# Patient Record
Sex: FEMALE | Race: WHITE | NOT HISPANIC OR LATINO | Employment: OTHER | URBAN - METROPOLITAN AREA
[De-identification: names, ages, dates, MRNs, and addresses within clinical notes are randomized per-mention and may not be internally consistent; named-entity substitution may affect disease eponyms.]

---

## 2017-03-07 ENCOUNTER — TRANSFERRED RECORDS (OUTPATIENT)
Dept: HEALTH INFORMATION MANAGEMENT | Facility: CLINIC | Age: 77
End: 2017-03-07

## 2017-03-07 LAB — TSH SERPL-ACNC: 3.3 UIU/ML (ref 0.3–5)

## 2017-07-01 DIAGNOSIS — Z12.31 VISIT FOR SCREENING MAMMOGRAM: ICD-10-CM

## 2017-07-01 PROCEDURE — G0202 SCR MAMMO BI INCL CAD: HCPCS | Mod: TC

## 2017-07-31 DIAGNOSIS — F33.3 SEVERE RECURRENT MAJOR DEPRESSIVE DISORDER WITH PSYCHOTIC FEATURES (H): ICD-10-CM

## 2017-07-31 NOTE — TELEPHONE ENCOUNTER
Pending Prescriptions:                       Disp   Refills    risperiDONE (RISPERDAL) 0.5 MG tablet [Ph*90 tab*0            Sig: TAKE 1 TABLET BY MOUTH DAILY AT BEDTIME.          Last Written Prescription Date: 11/28/2016  Last Fill Quantity: 90, # refills: 1  Last Office Visit with [facility], [facility] or [facility] prescribing provider: 11/28/2016       BP Readings from Last 3 Encounters:   11/28/16 141/80   06/14/16 133/80   02/23/16 142/80     Pulse Readings from Last 2 Encounters:   11/28/16 84   06/14/16 71     Lab Results   Component Value Date    GLC 72 08/07/2015     Lab Results   Component Value Date    WBC 7.6 08/07/2015     Lab Results   Component Value Date    RBC 4.58 08/07/2015     Lab Results   Component Value Date    HGB 13.5 08/07/2015     Lab Results   Component Value Date    HCT 40.3 08/07/2015     No components found for: MCT  Lab Results   Component Value Date    MCV 88 08/07/2015     Lab Results   Component Value Date    MCH 29.5 08/07/2015     Lab Results   Component Value Date    MCHC 33.5 08/07/2015     Lab Results   Component Value Date    RDW 13.3 08/07/2015     Lab Results   Component Value Date     08/07/2015     Lab Results   Component Value Date    CHOL 225 01/15/2015     Lab Results   Component Value Date    HDL 91 01/15/2015     Lab Results   Component Value Date     01/15/2015     Lab Results   Component Value Date    TRIG 143 01/15/2015     Lab Results   Component Value Date    CHOLHDLRATIO 2.5 01/15/2015     Timmy DAMON

## 2017-07-31 NOTE — TELEPHONE ENCOUNTER
Spoke with patient and scheduled appt next week but patient will be out.    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  Refilled x 6 months.  Please follow-up then

## 2017-08-01 RX ORDER — RISPERIDONE 0.5 MG/1
TABLET ORAL
Qty: 90 TABLET | Refills: 0 | Status: SHIPPED | OUTPATIENT
Start: 2017-08-01 | End: 2017-08-07

## 2017-08-07 ENCOUNTER — OFFICE VISIT (OUTPATIENT)
Dept: FAMILY MEDICINE | Facility: CLINIC | Age: 77
End: 2017-08-07
Payer: MEDICARE

## 2017-08-07 VITALS
WEIGHT: 134.8 LBS | HEIGHT: 61 IN | SYSTOLIC BLOOD PRESSURE: 140 MMHG | DIASTOLIC BLOOD PRESSURE: 80 MMHG | BODY MASS INDEX: 25.45 KG/M2 | HEART RATE: 79 BPM | TEMPERATURE: 98.3 F | OXYGEN SATURATION: 99 %

## 2017-08-07 DIAGNOSIS — M85.80 OSTEOPENIA, UNSPECIFIED LOCATION: Primary | ICD-10-CM

## 2017-08-07 DIAGNOSIS — Z78.0 POSTMENOPAUSAL STATUS: ICD-10-CM

## 2017-08-07 DIAGNOSIS — F33.3 SEVERE RECURRENT MAJOR DEPRESSIVE DISORDER WITH PSYCHOTIC FEATURES (H): ICD-10-CM

## 2017-08-07 DIAGNOSIS — N90.4 LICHEN SCLEROSUS ET ATROPHICUS OF THE VULVA: ICD-10-CM

## 2017-08-07 PROCEDURE — 99214 OFFICE O/P EST MOD 30 MIN: CPT | Performed by: PHYSICIAN ASSISTANT

## 2017-08-07 RX ORDER — FLUOXETINE 10 MG/1
10 TABLET, FILM COATED ORAL DAILY
Qty: 90 TABLET | Refills: 3 | Status: SHIPPED | OUTPATIENT
Start: 2017-08-07 | End: 2018-02-06

## 2017-08-07 RX ORDER — GABAPENTIN 300 MG/1
300 CAPSULE ORAL 3 TIMES DAILY
Qty: 270 CAPSULE | Refills: 3 | Status: SHIPPED | OUTPATIENT
Start: 2017-08-07 | End: 2018-07-02

## 2017-08-07 RX ORDER — RISPERIDONE 0.5 MG/1
TABLET ORAL
Qty: 90 TABLET | Refills: 3 | Status: SHIPPED | OUTPATIENT
Start: 2017-08-07 | End: 2018-07-02

## 2017-08-07 ASSESSMENT — ANXIETY QUESTIONNAIRES
3. WORRYING TOO MUCH ABOUT DIFFERENT THINGS: NOT AT ALL
5. BEING SO RESTLESS THAT IT IS HARD TO SIT STILL: NOT AT ALL
1. FEELING NERVOUS, ANXIOUS, OR ON EDGE: SEVERAL DAYS
IF YOU CHECKED OFF ANY PROBLEMS ON THIS QUESTIONNAIRE, HOW DIFFICULT HAVE THESE PROBLEMS MADE IT FOR YOU TO DO YOUR WORK, TAKE CARE OF THINGS AT HOME, OR GET ALONG WITH OTHER PEOPLE: NOT DIFFICULT AT ALL
6. BECOMING EASILY ANNOYED OR IRRITABLE: NOT AT ALL
GAD7 TOTAL SCORE: 1
7. FEELING AFRAID AS IF SOMETHING AWFUL MIGHT HAPPEN: NOT AT ALL
2. NOT BEING ABLE TO STOP OR CONTROL WORRYING: NOT AT ALL

## 2017-08-07 ASSESSMENT — PATIENT HEALTH QUESTIONNAIRE - PHQ9
5. POOR APPETITE OR OVEREATING: NOT AT ALL
SUM OF ALL RESPONSES TO PHQ QUESTIONS 1-9: 1

## 2017-08-07 NOTE — PROGRESS NOTES
SUBJECTIVE:                                                    Natividad Mcginnis is a 76 year old female who presents to clinic today for the following health issues:      Depression and Anxiety Follow-Up    Status since last visit: Improved     Other associated symptoms:None    Complicating factors:     Significant life event: No     Current substance abuse: None    PHQ-9 SCORE 1/12/2016 6/14/2016 11/28/2016   Total Score 4 6 1     MADISON-7 SCORE 8/24/2015 6/14/2016 11/28/2016   Total Score - - -   Total Score 7 2 3       PHQ-9  English  PHQ-9   Any Language  GAD7      Amount of exercise or physical activity: None    Problems taking medications regularly: No    Medication side effects: none  Diet: regular (no restrictions)          Problem list and histories reviewed & adjusted, as indicated.  Additional history: as documented    Current Outpatient Prescriptions   Medication Sig Dispense Refill     risperiDONE (RISPERDAL) 0.5 MG tablet TAKE 1 TABLET BY MOUTH DAILY AT BEDTIME. 90 tablet 3     FLUoxetine (PROZAC) 10 MG tablet Take 1 tablet (10 mg) by mouth daily 90 tablet 3     gabapentin (NEURONTIN) 300 MG capsule Take 1 capsule (300 mg) by mouth 3 times daily 270 capsule 3     alclomethasone (ACLOVATE) 0.05 % ointment Apply topically 2 times daily       levothyroxine (SYNTHROID, LEVOTHROID) 88 MCG tablet   0     fish oil-omega-3 fatty acids 1000 MG capsule Take 2 g by mouth daily       multivitamin, therapeutic with minerals (MULTI-VITAMIN) TABS Take 1 tablet by mouth daily. 100 tablet 3     calcium-vitamin D (CALCIUM 600/VITAMIN D) 600-400 MG-UNIT per tablet Take 1 tablet by mouth 2 times daily. 60 tablet      [DISCONTINUED] risperiDONE (RISPERDAL) 0.5 MG tablet TAKE 1 TABLET BY MOUTH DAILY AT BEDTIME.  90 tablet 0     [DISCONTINUED] FLUoxetine (PROZAC) 10 MG tablet Take 1 tablet (10 mg) by mouth daily 90 tablet 1     [DISCONTINUED] gabapentin (NEURONTIN) 300 MG capsule Take 1 capsule (300 mg) by mouth 3 times daily  "270 capsule 1     clobetasol (TEMOVATE) 0.05 % ointment Apply topically 2 times daily       BP Readings from Last 3 Encounters:   08/07/17 140/80   11/28/16 141/80   06/14/16 133/80    Wt Readings from Last 3 Encounters:   08/07/17 134 lb 12.8 oz (61.1 kg)   11/28/16 140 lb 1.6 oz (63.5 kg)   06/14/16 145 lb 12.8 oz (66.1 kg)                      Reviewed and updated as needed this visit by clinical staffTobacco  Allergies  Meds  Med Hx  Surg Hx  Fam Hx  Soc Hx      Reviewed and updated as needed this visit by Provider         ROS:  Constitutional, HEENT, cardiovascular, pulmonary, gi and gu systems are negative, except as otherwise noted.      OBJECTIVE:                                                    /80 (BP Location: Right arm, Patient Position: Chair, Cuff Size: Adult Regular)  Pulse 79  Temp 98.3  F (36.8  C) (Oral)  Ht 5' 0.5\" (1.537 m)  Wt 134 lb 12.8 oz (61.1 kg)  SpO2 99%  BMI 25.89 kg/m2  Body mass index is 25.89 kg/(m^2).  GENERAL APPEARANCE: healthy, alert and no distress  RESP: lungs clear to auscultation - no rales, rhonchi or wheezes  CV: regular rates and rhythm, normal S1 S2, no S3 or S4 and no murmur, click or rub  PSYCH: mentation appears normal and affect normal/bright    Diagnostic test results:  Diagnostic Test Results:  none        ASSESSMENT/PLAN:                                                    1. 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  Dispense: 90 tablet; Refill: 3  - FLUoxetine (PROZAC) 10 MG tablet; Take 1 tablet (10 mg) by mouth daily  Dispense: 90 tablet; Refill: 3  - gabapentin (NEURONTIN) 300 MG capsule; Take 1 capsule (300 mg) by mouth 3 times daily  Dispense: 270 capsule; Refill: 3    2. Osteopenia, unspecified location      3. Postmenopausal status    - DX Hip/Pelvis/Spine; Future    4. 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 Refilled x 1 year   Plan: risperiDONE (RISPERDAL) 0.5 MG tablet,         FLUoxetine (PROZAC) 10 MG tablet, gabapentin         (NEURONTIN) 300 MG capsule            (Z78.0) Postmenopausal status  Comment:   Plan: DX Hip/Pelvis/Spine            (N90.4) Lichen sclerosus et atrophicus of the vulva  Comment: advised follow-up with Dr. Corrales   Plan: OB/GYN REFERRAL                  Ramona Ann Aaseby-Aguilera, PA-C  [facility]

## 2017-08-07 NOTE — NURSING NOTE
"Chief Complaint   Patient presents with     Recheck Medication       Initial /80 (BP Location: Right arm, Patient Position: Chair, Cuff Size: Adult Regular)  Pulse 79  Temp 98.3  F (36.8  C) (Oral)  Ht 5' 0.5\" (1.537 m)  Wt 134 lb 12.8 oz (61.1 kg)  SpO2 99%  BMI 25.89 kg/m2 Estimated body mass index is 25.89 kg/(m^2) as calculated from the following:    Height as of this encounter: 5' 0.5\" (1.537 m).    Weight as of this encounter: 134 lb 12.8 oz (61.1 kg).  Medication Reconciliation: complete Kimberly Rojas CMA      "

## 2017-08-07 NOTE — PATIENT INSTRUCTIONS
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 Refilled x 1 year   Plan: risperiDONE (RISPERDAL) 0.5 MG tablet,         FLUoxetine (PROZAC) 10 MG tablet, gabapentin         (NEURONTIN) 300 MG capsule            (Z78.0) Postmenopausal status  Comment:   Plan: DX Hip/Pelvis/Spine            (N90.4) Lichen sclerosus et atrophicus of the vulva  Comment: advised follow-up with Dr. Corrales   Plan: OB/GYN REFERRAL

## 2017-08-07 NOTE — MR AVS SNAPSHOT
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 Refilled x 1 year   Plan: risperiDONE (RISPERDAL) 0.5 MG tablet,         FLUoxetine (PROZAC) 10 MG tablet, gabapentin         (NEURONTIN) 300 MG capsule            (Z78.0) Postmenopausal status  Comment:   Plan: DX Hip/Pelvis/Spine            (N90.4) Lichen sclerosus et atrophicus of the vulva  Comment: advised follow-up with Dr. Corrales   Plan: OB/GYN REFERRAL                      Follow-ups after your visit        Additional Services     OB/GYN REFERRAL       Your provider has referred you to:  [facility] [phone]   [email or web address]    Please be aware that coverage of these services is subject to the terms and limitations of your health insurance plan.  Call member services at your health plan with any benefit or coverage questions.      Please bring the following with you to your appointment:    (1) Any X-Rays, CTs or MRIs which have been performed.  Contact the facility where they were done to arrange for  prior to your scheduled appointment.   (2) List of current medications   (3) This referral request   (4) Any documents/labs given to you for this referral                  Future  "tests that were ordered for you today     Open Future Orders        Priority Expected Expires Ordered    DX Hip/Pelvis/Spine Routine  8/7/2018 8/7/2017            Who to contact     If you have questions or need follow up information about today's clinic visit or your schedule please contact [facility] directly at [phone].  Normal or non-critical lab and imaging results will be communicated to you by MyChart, letter or phone within 4 business days after the clinic has received the results. If you do not hear from us within 7 days, please contact the clinic through AllergEasehart or phone. If you have a critical or abnormal lab result, we will notify you by phone as soon as possible.  Submit refill requests through [employer] or call your pharmacy and they will forward the refill request to us. Please allow 3 business days for your refill to be completed.          Additional Information About Your Visit        AllergEasehart Information     [employer] gives you secure access to your electronic health record. If you see a primary care provider, you can also send messages to your care team and make appointments. If you have questions, please call your primary care clinic.  If you do not have a primary care provider, please call [phone] and they will assist you.        Care EveryWhere ID     This is your Care EveryWhere ID. This could be used by other organizations to access your [place] medical records  [id number]        Your Vitals Were     Pulse Temperature Height Pulse Oximetry BMI (Body Mass Index)       79 98.3  F (36.8  C) (Oral) 5' 0.5\" (1.537 m) 99% 25.89 kg/m2        Blood Pressure from Last 3 Encounters:   08/07/17 140/80   11/28/16 141/80   06/14/16 133/80    Weight from Last 3 Encounters:   08/07/17 134 lb 12.8 oz (61.1 kg)   11/28/16 140 lb 1.6 oz (63.5 kg)   06/14/16 145 lb 12.8 oz (66.1 kg)              We Performed the Following     OB/GYN REFERRAL          Today's Medication Changes        "   These changes are accurate as of: 8/7/17 11:14 AM.  If you have any questions, ask your nurse or doctor.               These medicines have changed or have updated prescriptions.        Dose/Directions    risperiDONE 0.5 MG tablet   Commonly known as:  risperDAL   This may have changed:  See the new instructions.   Used for:  Severe recurrent major depressive disorder with psychotic features (H)   Changed by:  Aaseby-Aguilera, Ramona Ann, PA-C        TAKE 1 TABLET BY MOUTH DAILY AT BEDTIME.   Quantity:  90 tablet   Refills:  3            Where to get your medicines      These medications were sent to [id number] - [facility] [address] [address]     Phone:  [phone]     FLUoxetine 10 MG tablet    gabapentin 300 MG capsule    risperiDONE 0.5 MG tablet                Primary Care Provider Office Phone # Fax #    Ramona Ann Aaseby-Aguilera, PA-C [phone]       [facility] [address] [facility] [address]        Equal Access to Services     JIMMIE RUIZ : Hadii marii ku hadasho Soomaali, waaxda luqadaha, qaybta kaalmada adeegyada, waxay idiin haysamn saleem menjivar . So [facility] [phone].    ATENCIÓN: Si habla español, tiene a valentine disposición servicios gratuitos de asistencia lingüística. Llame al [phone].    We comply with applicable federal civil rights laws and [place] laws. We do not discriminate on the basis of race, color, national origin, age, disability sex, sexual orientation or gender identity.            Thank you!     Thank you for choosing [facility]  for your care. Our goal is always to provide you with excellent care. Hearing back from our patients is one way we can continue to improve our services. Please take a few minutes to complete the written survey that you may receive in the mail after your visit with us. Thank you!             Your Updated Medication List - Protect  others around you: Learn how to safely use, store and throw away your medicines at www.disposemymeds.org.          This list is accurate as of: 8/7/17 11:14 AM.  Always use your most recent med list.                   Brand Name Dispense Instructions for use Diagnosis    alclomethasone 0.05 % ointment    ACLOVATE     Apply topically 2 times daily        calcium 600/vitamin D 600-400 MG-UNIT per tablet   Generic drug:  calcium-vitamin D     60 tablet    Take 1 tablet by mouth 2 times daily.        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        risperiDONE 0.5 MG tablet    risperDAL    90 tablet    TAKE 1 TABLET BY MOUTH DAILY AT BEDTIME.    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

## 2017-08-08 ASSESSMENT — ANXIETY QUESTIONNAIRES: GAD7 TOTAL SCORE: 1

## 2017-08-23 ENCOUNTER — RADIANT APPOINTMENT (OUTPATIENT)
Dept: BONE DENSITY | Facility: CLINIC | Age: 77
End: 2017-08-23
Payer: MEDICARE

## 2017-08-23 DIAGNOSIS — Z78.0 POSTMENOPAUSAL STATUS: ICD-10-CM

## 2017-08-23 PROCEDURE — 77085 DXA BONE DENSITY AXL VRT FX: CPT | Performed by: FAMILY MEDICINE

## 2017-10-10 ENCOUNTER — OFFICE VISIT (OUTPATIENT)
Dept: FAMILY MEDICINE | Facility: CLINIC | Age: 77
End: 2017-10-10
Payer: MEDICARE

## 2017-10-10 VITALS
TEMPERATURE: 98.3 F | BODY MASS INDEX: 27.73 KG/M2 | OXYGEN SATURATION: 96 % | DIASTOLIC BLOOD PRESSURE: 80 MMHG | HEIGHT: 61 IN | WEIGHT: 146.9 LBS | HEART RATE: 77 BPM | SYSTOLIC BLOOD PRESSURE: 143 MMHG

## 2017-10-10 DIAGNOSIS — M81.6 LOCALIZED OSTEOPOROSIS WITHOUT CURRENT PATHOLOGICAL FRACTURE: Primary | ICD-10-CM

## 2017-10-10 PROCEDURE — 82306 VITAMIN D 25 HYDROXY: CPT | Performed by: PHYSICIAN ASSISTANT

## 2017-10-10 PROCEDURE — 99213 OFFICE O/P EST LOW 20 MIN: CPT | Performed by: PHYSICIAN ASSISTANT

## 2017-10-10 PROCEDURE — 36415 COLL VENOUS BLD VENIPUNCTURE: CPT | Performed by: PHYSICIAN ASSISTANT

## 2017-10-10 NOTE — MR AVS SNAPSHOT
"              After Visit Summary   10/10/2017    Natividad Mcginnis    MRN: [id number]           Patient Information     Date Of Birth          1940        Visit Information        Provider Department      10/10/2017 11:00 AM Aaseby-Aguilera, Ramona Ann, PA-C [facility]        Today's Diagnoses     Localized osteoporosis without current pathological fracture    -  1       Follow-ups after your visit        Who to contact     If you have questions or need follow up information about today's clinic visit or your schedule please contact [facility] directly at [phone].  Normal or non-critical lab and imaging results will be communicated to you by ENDYMIONhart, letter or phone within 4 business days after the clinic has received the results. If you do not hear from us within 7 days, please contact the clinic through ENDYMIONhart or phone. If you have a critical or abnormal lab result, we will notify you by phone as soon as possible.  Submit refill requests through [employer] or call your pharmacy and they will forward the refill request to us. Please allow 3 business days for your refill to be completed.          Additional Information About Your Visit        MyChart Information     [employer] gives you secure access to your electronic health record. If you see a primary care provider, you can also send messages to your care team and make appointments. If you have questions, please call your primary care clinic.  If you do not have a primary care provider, please call [phone] and they will assist you.        Care EveryWhere ID     This is your Care EveryWhere ID. This could be used by other organizations to access your [place] medical records  [id number]        Your Vitals Were     Pulse Temperature Height Pulse Oximetry BMI (Body Mass Index)       77 98.3  F (36.8  C) (Oral) 5' 0.5\" (1.537 m) 96% 28.22 kg/m2        Blood Pressure from Last 3 Encounters:   10/10/17 143/80   08/07/17 140/80 "   11/28/16 141/80    Weight from Last 3 Encounters:   10/10/17 146 lb 14.4 oz (66.6 kg)   08/07/17 134 lb 12.8 oz (61.1 kg)   11/28/16 140 lb 1.6 oz (63.5 kg)              We Performed the Following     Vitamin D Deficiency        Primary Care Provider Office Phone # Fax #    Staci Ann Aaseby-Aguilera, PA-C [phone] [address] [address] [facility] [address]        Equal Access to Services     [facility]: Hadii aad ku hadasho Soomaali, waaxda luqadaha, qaybta kaalmada adeegyada, waxay krishna menjivar . So [facility] [phone].    ATENCIÓN: Si habla español, tiene a valentine disposición servicios gratuitos de asistencia lingüística. Ll[facility] [phone].    We comply with applicable federal civil rights laws and [place] laws. We do not discriminate on the basis of race, color, national origin, age, disability, sex, sexual orientation, or gender identity.            Thank you!     Thank you for choosing [facility]  for your care. Our goal is always to provide you with excellent care. Hearing back from our patients is one way we can continue to improve our services. Please take a few minutes to complete the written survey that you may receive in the mail after your visit with us. Thank you!             Your Updated Medication List - Protect others around you: Learn how to safely use, store and throw away your medicines at www.disposemymeds.org.          This list is accurate as of: 10/10/17  1:04 PM.  Always use your most recent med list.                   Brand Name Dispense Instructions for use Diagnosis    alclomethasone 0.05 % ointment    ACLOVATE     Apply topically 2 times daily        calcium 600/vitamin D 600-400 MG-UNIT per tablet   Generic drug:  calcium-vitamin D     60 tablet    Take 1 tablet by mouth 2 times daily.        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        risperiDONE 0.5 MG tablet    risperDAL    90 tablet    TAKE 1 TABLET BY MOUTH DAILY AT BEDTIME.    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

## 2017-10-10 NOTE — PROGRESS NOTES
SUBJECTIVE:   Natividad Mcginnis is a 77 year old female who presents to clinic today for the following health issues:      Go over dexa scan.  Went from osteopenia to osteoporosis in left femeral head.  Has not been on bisphosphonate. Has had issues with bone loss over lower jaw and needed to replace bottom front teeth,  Is taking calcium 1200 mg daily and 800 mg of vitamin D.  Not exercising        Problem list and histories reviewed & adjusted, as indicated.  Additional history: as documented    Current Outpatient Prescriptions   Medication Sig Dispense Refill     risperiDONE (RISPERDAL) 0.5 MG tablet TAKE 1 TABLET BY MOUTH DAILY AT BEDTIME. 90 tablet 3     FLUoxetine (PROZAC) 10 MG tablet Take 1 tablet (10 mg) by mouth daily 90 tablet 3     gabapentin (NEURONTIN) 300 MG capsule Take 1 capsule (300 mg) by mouth 3 times daily 270 capsule 3     alclomethasone (ACLOVATE) 0.05 % ointment Apply topically 2 times daily       levothyroxine (SYNTHROID, LEVOTHROID) 88 MCG tablet   0     fish oil-omega-3 fatty acids 1000 MG capsule Take 2 g by mouth daily       clobetasol (TEMOVATE) 0.05 % ointment Apply topically 2 times daily       multivitamin, therapeutic with minerals (MULTI-VITAMIN) TABS Take 1 tablet by mouth daily. 100 tablet 3     calcium-vitamin D (CALCIUM 600/VITAMIN D) 600-400 MG-UNIT per tablet Take 1 tablet by mouth 2 times daily. 60 tablet      BP Readings from Last 3 Encounters:   10/10/17 143/80   08/07/17 140/80   11/28/16 141/80    Wt Readings from Last 3 Encounters:   10/10/17 146 lb 14.4 oz (66.6 kg)   08/07/17 134 lb 12.8 oz (61.1 kg)   11/28/16 140 lb 1.6 oz (63.5 kg)                      Reviewed and updated as needed this visit by clinical staffTobacco  Allergies  Meds  Med Hx  Surg Hx  Fam Hx  Soc Hx      Reviewed and updated as needed this visit by Provider         ROS:  Constitutional, HEENT, cardiovascular, pulmonary, gi and gu systems are negative, except as otherwise  "noted.      OBJECTIVE:                                                    /80 (BP Location: Right arm, Patient Position: Chair, Cuff Size: Adult Regular)  Pulse 77  Temp 98.3  F (36.8  C) (Oral)  Ht 5' 0.5\" (1.537 m)  Wt 146 lb 14.4 oz (66.6 kg)  SpO2 96%  BMI 28.22 kg/m2  Body mass index is 28.22 kg/(m^2).  GENERAL APPEARANCE: healthy, alert and no distress  RESP: lungs clear to auscultation - no rales, rhonchi or wheezes  CV: regular rates and rhythm, normal S1 S2, no S3 or S4 and no murmur, click or rub  LYMPHATICS: normal ant/post cervical and supraclavicular nodes    Diagnostic test results:  Diagnostic Test Results:  none        ASSESSMENT/PLAN:                                                    1. Localized osteoporosis without current pathological fracture  After discussing pros and cons,and  Due the various side effects and  JoAnns issues with bone lost on lower mandible already, we chose to increase exercising and possible vit d.  Well check vitamin D today and determine plan. Well readdress next year after next Dexa  - Vitamin D Deficiency          Ramona Ann Aaseby-Aguilera, PA-C  [facility]    "

## 2017-10-10 NOTE — NURSING NOTE
"Chief Complaint   Patient presents with     going over the dexa scan       Initial /80 (BP Location: Right arm, Patient Position: Chair, Cuff Size: Adult Regular)  Pulse 77  Temp 98.3  F (36.8  C) (Oral)  Ht 5' 0.5\" (1.537 m)  Wt 146 lb 14.4 oz (66.6 kg)  SpO2 96%  BMI 28.22 kg/m2 Estimated body mass index is 28.22 kg/(m^2) as calculated from the following:    Height as of this encounter: 5' 0.5\" (1.537 m).    Weight as of this encounter: 146 lb 14.4 oz (66.6 kg).  Medication Reconciliation: complete Kimberly Rojas CMA      "

## 2017-10-11 LAB — DEPRECATED CALCIDIOL+CALCIFEROL SERPL-MC: 54 UG/L (ref 20–75)

## 2017-10-27 DIAGNOSIS — F33.3 SEVERE RECURRENT MAJOR DEPRESSIVE DISORDER WITH PSYCHOTIC FEATURES (H): ICD-10-CM

## 2017-10-27 RX ORDER — FLUOXETINE 10 MG/1
TABLET, FILM COATED ORAL
Qty: 90 TABLET | Refills: 0 | OUTPATIENT
Start: 2017-10-27

## 2017-12-06 ENCOUNTER — APPOINTMENT (OUTPATIENT)
Dept: CT IMAGING | Facility: CLINIC | Age: 77
End: 2017-12-06
Attending: EMERGENCY MEDICINE
Payer: MEDICARE

## 2017-12-06 ENCOUNTER — HOSPITAL ENCOUNTER (EMERGENCY)
Facility: CLINIC | Age: 77
Discharge: HOME | End: 2017-12-06
Attending: EMERGENCY MEDICINE | Admitting: EMERGENCY MEDICINE
Payer: MEDICARE

## 2017-12-06 VITALS
DIASTOLIC BLOOD PRESSURE: 82 MMHG | RESPIRATION RATE: 20 BRPM | WEIGHT: 146 LBS | HEIGHT: 62 IN | BODY MASS INDEX: 26.87 KG/M2 | OXYGEN SATURATION: 94 % | TEMPERATURE: 98.4 F | SYSTOLIC BLOOD PRESSURE: 201 MMHG

## 2017-12-06 DIAGNOSIS — S01.01XA LACERATION OF SCALP, INITIAL ENCOUNTER: ICD-10-CM

## 2017-12-06 PROCEDURE — 70450 CT HEAD/BRAIN W/O DYE: CPT

## 2017-12-06 PROCEDURE — 90471 IMMUNIZATION ADMIN: CPT

## 2017-12-06 PROCEDURE — 25000125 ZZHC RX 250

## 2017-12-06 PROCEDURE — 12001 RPR S/N/AX/GEN/TRNK 2.5CM/<: CPT

## 2017-12-06 PROCEDURE — 99284 EMERGENCY DEPT VISIT MOD MDM: CPT | Mod: 25

## 2017-12-06 PROCEDURE — 90715 TDAP VACCINE 7 YRS/> IM: CPT | Performed by: EMERGENCY MEDICINE

## 2017-12-06 PROCEDURE — 93005 ELECTROCARDIOGRAM TRACING: CPT

## 2017-12-06 PROCEDURE — 25000128 H RX IP 250 OP 636: Performed by: EMERGENCY MEDICINE

## 2017-12-06 RX ORDER — LIDOCAINE HYDROCHLORIDE 10 MG/ML
INJECTION, SOLUTION INFILTRATION; PERINEURAL
Status: DISCONTINUED
Start: 2017-12-06 | End: 2017-12-06 | Stop reason: HOSPADM

## 2017-12-06 RX ADMIN — CLOSTRIDIUM TETANI TOXOID ANTIGEN (FORMALDEHYDE INACTIVATED), CORYNEBACTERIUM DIPHTHERIAE TOXOID ANTIGEN (FORMALDEHYDE INACTIVATED), BORDETELLA PERTUSSIS TOXOID ANTIGEN (GLUTARALDEHYDE INACTIVATED), BORDETELLA PERTUSSIS FILAMENTOUS HEMAGGLUTININ ANTIGEN (FORMALDEHYDE INACTIVATED), BORDETELLA PERTUSSIS PERTACTIN ANTIGEN, AND BORDETELLA PERTUSSIS FIMBRIAE 2/3 ANTIGEN 0.5 ML: 5; 2; 2.5; 5; 3; 5 INJECTION, SUSPENSION INTRAMUSCULAR at 20:35

## 2017-12-06 RX ADMIN — Medication 3 ML: at 17:48

## 2017-12-06 ASSESSMENT — ENCOUNTER SYMPTOMS
COUGH: 0
BACK PAIN: 1
DIZZINESS: 0
WEAKNESS: 0
APNEA: 0
WOUND: 1
COLOR CHANGE: 0
DIARRHEA: 0
CONSTIPATION: 0
DIFFICULTY URINATING: 0
DYSURIA: 0
NAUSEA: 0
JOINT SWELLING: 0
SHORTNESS OF BREATH: 0
FEVER: 0
ABDOMINAL PAIN: 0
NECK STIFFNESS: 0
ARTHRALGIAS: 0
LIGHT-HEADEDNESS: 0
CHOKING: 0
HEADACHES: 1
VOMITING: 0
NUMBNESS: 0
NECK PAIN: 1
SPEECH DIFFICULTY: 0
SEIZURES: 0

## 2017-12-06 NOTE — ED NOTES
A&Ox4. ABC's intact. Pt c/o fall on the ice around 1500 today, hit the back of her head and right wrist.  Unknown LOC.  Pt was able to get back up and walk back into the house.     No blood thinners noted.

## 2017-12-06 NOTE — ED AVS SNAPSHOT
[facility] Emergency Department    [address]    [facility] [address]    Phone:  [phone]    Fax:  [phone]                                       Natividad Mcginnis   MRN: [id number]    Department:  [facility] Emergency Department   Date of Visit:  12/6/2017           Patient Information     Date Of Birth          1940        Your diagnoses for this visit were:     Laceration of scalp, initial encounter        You were seen by Dia Sinclair MD.      Follow-up Information     Follow up with Aaseby-Aguilera, Ramona Ann, PA-C In 1 week.    Specialty:  Physician Assistant    Contact information:    [address]  [facility] [address] [phone]          Discharge Instructions       Discharge Instructions  Laceration (Cut)    You were seen today for a laceration (cut).  Your doctor examined your laceration for any problems such a buried foreign body (like glass, a splinter, or gravel), or injury to blood vessels, tendons, and nerves.  Your doctor may have also rinsed and/or scrubbed your laceration to help prevent an infection.  Your laceration may have been closed with glue, staples or sutures (stitches).      It may not be possible to find all problems with your laceration on the first visit, and we can't always prevent infections.  Antibiotics are only given when the benefit is more than the risk, and don't prevent all infections. Some lacerations are too high risk to close, and are left open to heal.  All lacerations, no matter how expertly repaired, will cause scarring.    Return to the Emergency Department right away if:    You have more redness, swelling, pain, drainage (pus), a bad smell, or red streaking from your laceration.      You have a fever of 101oF or more.    You have bleeding that you can t stop at home. If your cut starts to bleed, hold pressure on the bleeding area with a clean cloth or put pressure over the bandage.  If the bleeding doesn t  stop after using constant pressure for 30 minutes, you should return to the Emergency Department for further treatment.    An area past the laceration is cool, pale, or blue compared with the other side, or has a slower return of color when squeezed.    Your dressing seems too tight or starts to get uncomfortable or painful.    You have loss of normal function or use of an area, such as being unable to straighten or bend a finger normally.    You have a numb area past the laceration.    Return to the Emergency Department or see your regular doctor if:    The laceration starts to come open.     You have something coming out of the cut or a feeling that there is something in the laceration.    Your wound will not heal, or keeps breaking open. There can always be glass, wood, dirt or other things in any wound.  They won t always show up, even on x-rays.  If a wound doesn t heal, this may be why, and it is important to follow-up with your regular doctor.    Home Care:    Take your dressing off in 12 hours, or as instructed by your doctor, to check your laceration. Remove the dressing sooner if it seems too tight or painful, or if it is getting numb, tingly, or pale past the dressing.    Gently wash your laceration 2 times a day with clean cloth and soap.     It is okay to shower, but do not let the laceration soak in water.      If your laceration was closed with wound adhesive or strips: pat it dry and leave it open to the air.     For all other repairs: after you wash your laceration, or at least 2 times a day, apply bacitracin or other antibiotic ointment to the laceration, then cover it with a Band-Aid  or gauze.    Keep the laceration clean. Wear gloves or other protective clothing if you are around dirt.    Follow-up:    You need to follow-up with your regular doctor in 7 days.    Your sutures or staples need to be removed in 7 days. Schedule an appointment with your regular doctor to have this done.    Scars:  To  "help minimize scarring:    Wear sunscreen over the healed laceration when out in the sun.    Massage the area regularly.    You may use Vitamin E oil.    Wait a year.  Most scars will start to fade within a year.    Probiotics: If you have been given an antibiotic, you may want to also take a probiotic pill or eat yogurt with live cultures. Probiotics have \"good bacteria\" to help your intestines stay healthy. Studies have shown that probiotics help prevent diarrhea and other intestine problems (including C. diff infection) when you take antibiotics. You can buy these without a prescription in the pharmacy section of the store.     If you were given a prescription for medicine here today, be sure to read all of the information (including the package insert) that comes with your prescription.  This will include important information about the medicine, its side effects, and any warnings that you need to know about.  The pharmacist who fills the prescription can provide more information and answer questions you may have about the medicine.  If you have questions or concerns that the pharmacist cannot address, please call or return to the Emergency Department.     Discharge Instructions  Head Injury    You have been seen today for a head injury. You were checked for serious problems, like bleeding on the brain, but these problems cannot always be found right away.  Due to this risk, you should not be alone for 24 hours after your injury.  Follow up with your regular physician in 3 days. If you are taking a blood thinner, such as aspirin, Pradaxa  (dabigatran), Coumadin  (warfarin), or Plavix  (clopidogrel), you are at especially high risk for immediate or delayed bleeding, and need to re-check with a physician in 24 hours, or sooner if any of the symptoms below happen.     Return to the Emergency Department if:    You are confused, have amnesia, or you are not acting right.    Your headache gets worse or you start to " have a really bad headache even with your recommended treatment plan.    You vomit more than once.    You have a convulsion or seizure.    You have trouble walking.    You have weakness or paralysis in an arm or a leg.    You have blood or fluid coming from your ears or nose.    You have new symptoms or anything that worries you.    Sleeping:  It is okay for you to sleep, but someone should wake you up as instructed by your doctor, and someone should check on you at your usual time to wake up.     Activity:    Do not drive for at least 24 hours.    Do not drive if you have dizzy spells or trouble concentrating, or remembering things.    Do not return to any contact sports until cleared by your regular doctor.     Follow-up:  It is very important that you make an appointment with your clinic and go to the appointment.  If you do not follow-up with your regular doctor, it may result in missing an important development which could result in permanent injury or disability and/or lasting pain.  If there is any problem keeping your appointment, call your doctor or return to the Emergency Department.    MORE INFORMATION:    Concussion:  A concussion is a minor head injury that may cause temporary problems with the way your brain works.  Some symptoms include:  confusion, amnesia, nausea and vomiting, dizziness, fatigue, memory or concentration problems, irritability and sleep problems.    CT Scans: Your evaluation today may have included a CT scan (CAT scan) to look for things like bleeding or a skull fracture (break).  CT scans involve radiation and too many CT scans can cause serious health problems like cancer, especially in children.  Because of this, your doctor may not have ordered a CT scan today if they think you are at low risk for a serious or life threatening problem.    If you were given a prescription for medicine here today, be sure to read all of the information (including the package insert) that comes with  your prescription.  This will include important information about the medicine, its side effects, and any warnings that you need to know about.  The pharmacist who fills the prescription can provide more information and answer questions you may have about the medicine.  If you have questions or concerns that the pharmacist cannot address, please call or return to the Emergency Department. 2        24 Hour Appointment Hotline       To make an appointment at any [facility], call [phone] ([phone]). If you don't have a family doctor or clinic, we will help you find one. [facility] are conveniently located to serve the needs of you and your family.             Review of your medicines      Our records show that you are taking the medicines listed below. If these are incorrect, please call your family doctor or clinic.        Dose / Directions Last dose taken    alclomethasone 0.05 % ointment   Commonly known as:  ACLOVATE        Apply topically 2 times daily   Refills:  0        calcium 600/vitamin D 600-400 MG-UNIT per tablet   Dose:  1 tablet   Quantity:  60 tablet   Generic drug:  calcium-vitamin D        Take 1 tablet by mouth 2 times daily.   Refills:  0        clobetasol 0.05 % ointment   Commonly known as:  TEMOVATE        Apply topically 2 times daily   Refills:  0        fish oil-omega-3 fatty acids 1000 MG capsule   Dose:  2 g        Take 2 g by mouth daily   Refills:  0        FLUoxetine 10 MG tablet   Commonly known as:  PROzac   Dose:  10 mg   Quantity:  90 tablet        Take 1 tablet (10 mg) by mouth daily   Refills:  3        gabapentin 300 MG capsule   Commonly known as:  NEURONTIN   Dose:  300 mg   Quantity:  270 capsule        Take 1 capsule (300 mg) by mouth 3 times daily   Refills:  3        levothyroxine 88 MCG tablet   Commonly known as:  SYNTHROID/LEVOTHROID        Refills:  0        Multi-vitamin Tabs tablet   Dose:  1 tablet   Quantity:  100 tablet        Take 1 tablet by  mouth daily.   Refills:  3        risperiDONE 0.5 MG tablet   Commonly known as:  risperDAL   Quantity:  90 tablet        TAKE 1 TABLET BY MOUTH DAILY AT BEDTIME.   Refills:  3                Procedures and tests performed during your visit     Head CT w/o contrast      Orders Needing Specimen Collection     None      Pending Results     No orders found from 12/4/2017 to 12/7/2017.            Pending Culture Results     No orders found from 12/4/2017 to 12/7/2017.            Pending Results Instructions     If you had any lab results that were not finalized at the time of your Discharge, you can call the ED Lab Result RN at [phone]. You will be contacted by this team for any positive Lab results or changes in treatment. The nurses are available 7 days a week from 10A to 6:30P.  You can leave a message 24 hours per day and they will return your call.        Test Results From Your Hospital Stay        12/6/2017  6:52 PM      Narrative     CT SCAN OF THE HEAD WITHOUT CONTRAST   12/6/2017 6:16 PM     HISTORY: Trauma.    TECHNIQUE: Axial images of the head and coronal reformations without  IV contrast material. Radiation dose for this scan was reduced using  automated exposure control, adjustment of the mA and/or kV according  to patient size, or iterative reconstruction technique.    COMPARISON: 9/12/2007.    FINDINGS: There is no evidence of intracranial hemorrhage, mass, acute  infarct or anomaly. The ventricles are normal in size, shape and  configuration. The brain parenchyma and subarachnoid spaces are  normal.    The visualized portions of the sinuses and mastoids appear normal. The  bony calvarium and bones of the skull base appear intact.         Impression     IMPRESSION: No evidence of acute intracranial hemorrhage, mass, or  herniation.      LUCHO SANCHES MD                Clinical Quality Measure: Blood Pressure Screening     Your blood pressure was checked while you were in the emergency department  today. The last reading we obtained was  BP: (!) 201/82 . Please read the guidelines below about what these numbers mean and what you should do about them.  If your systolic blood pressure (the top number) is less than 120 and your diastolic blood pressure (the bottom number) is less than 80, then your blood pressure is normal. There is nothing more that you need to do about it.  If your systolic blood pressure (the top number) is 120-139 or your diastolic blood pressure (the bottom number) is 80-89, your blood pressure may be higher than it should be. You should have your blood pressure rechecked within a year by a primary care provider.  If your systolic blood pressure (the top number) is 140 or greater or your diastolic blood pressure (the bottom number) is 90 or greater, you may have high blood pressure. High blood pressure is treatable, but if left untreated over time it can put you at risk for heart attack, stroke, or kidney failure. You should have your blood pressure rechecked by a primary care provider within the next 4 weeks.  If your provider in the emergency department today gave you specific instructions to follow-up with your doctor or provider even sooner than that, you should follow that instruction and not wait for up to 4 weeks for your follow-up visit.        Thank you for choosing [facility]       Thank you for choosing [facility] for your care. Our goal is always to provide you with excellent care. Hearing back from our patients is one way we can continue to improve our services. Please take a few minutes to complete the written survey that you may receive in the mail after you visit with us. Thank you!        [facility] Information     [employer] gives you secure access to your electronic health record. If you see a primary care provider, you can also send messages to your care team and make appointments. If you have questions, please call your primary care clinic.  If you do not have a primary care  provider, please call [phone] and they will assist you.        Care EveryWhere ID     This is your Care EveryWhere ID. This could be used by other organizations to access your [place] medical records  [id number]        Equal Access to Services     JIMMIE RUIZ : Emely Crowley, [place], qadonya kaalkristine paige, julian mendosa. So [facility] [phone].    ATENCIÓN: Si habla español, tiene a valentine disposición servicios gratuitos de asistencia lingüística. Llame al [phone].    We comply with applicable federal civil rights laws and [place] laws. We do not discriminate on the basis of race, color, national origin, age, disability, sex, sexual orientation, or gender identity.            After Visit Summary       This is your record. Keep this with you and show to your community pharmacist(s) and doctor(s) at your next visit.

## 2017-12-06 NOTE — ED AVS SNAPSHOT
[facility] Emergency Department    [address]    [facility] [address]    Phone:  [phone]    Fax:  [phone]                                       Natividad Mcginnis   MRN: [id number]    Department:  [facility] Emergency Department   Date of Visit:  12/6/2017           After Visit Summary Signature Page     I have received my discharge instructions, and my questions have been answered. I have discussed any challenges I see with this plan with the nurse or doctor.    ..........................................................................................................................................  Patient/Patient Representative Signature      ..........................................................................................................................................  Patient Representative Print Name and Relationship to Patient    ..................................................               ................................................  Date                                            Time    ..........................................................................................................................................  Reviewed by Signature/Title    ...................................................              ..............................................  Date                                                            Time

## 2017-12-06 NOTE — ED PROVIDER NOTES
History     Chief Complaint:  Fall    HPI   Natividad Mcginnis is a 77 year old female who presents with a fall. The patient reports that she fell on ice outside of her house at approximately 1500 this afternoon and landed on her back as well as hitting the back of her head on the ground. She reports possible loss of consciousness but was able to get up and ambulate after the fall. She currently has complaints of a headache and some slight back pain, as well as a laceration to her posterior scalp. She denies any nausea, vomiting, diarrhea, incontinence, seizures, shortness of breath, chest pains, neck pains, visual disturbances, or any other symptoms.    Allergies:  Hydrocortisone valerate  Latex  Nickel Sulfate  Tert-Butyl hydroquinone      Medications:    risperiDONE (RISPERDAL) 0.5 MG tablet  FLUoxetine (PROZAC) 10 MG tablet  gabapentin (NEURONTIN) 300 MG capsule  levothyroxine (SYNTHROID, LEVOTHROID) 88 MCG tablet    Past Medical History:    Anxiety  Depressive disorder  Thyroid disease    Past Surgical History:    3   Appendectomy  Colonoscopy  Tubal ligation  Breast biopsy     Family History:    Mother-breast cancer, lung cancer  Father-lung cancer, heart disease  Sister-colon cancer, colorectal cancer    Social History:  Smoking status: Former smoker  Alcohol use: No  Marital Status:        Review of Systems   Constitutional: Negative for fever.   Eyes: Negative for visual disturbance.   Respiratory: Negative for apnea, cough, choking and shortness of breath.    Cardiovascular: Positive for chest pain.   Gastrointestinal: Negative for abdominal pain, constipation, diarrhea, nausea and vomiting.   Genitourinary: Negative for decreased urine volume, difficulty urinating, dysuria and urgency.   Musculoskeletal: Positive for back pain and neck pain. Negative for arthralgias, gait problem, joint swelling and neck stiffness.   Skin: Positive for wound. Negative for color change.   Neurological: Positive  "for syncope and headaches. Negative for dizziness, seizures, speech difficulty, weakness, light-headedness and numbness.   All other systems reviewed and are negative.    Physical Exam     Patient Vitals for the past 24 hrs:   BP Temp Temp src Heart Rate Resp SpO2 Height Weight   12/06/17 1920 - - - - - 96 % - -   12/06/17 1918 (!) 201/82 - - - - - - -   12/06/17 1745 - - - - - 94 % - -   12/06/17 1736 (!) 221/84 - - - - - - -   12/06/17 1733 (!) 231/121 98.4  F (36.9  C) Oral 102 20 97 % 1.575 m (5' 2\") 66.2 kg (146 lb)   12/06/17 1730 (!) 231/121 - - - - 95 % - -         Physical Exam  General: Patient is alert and interactive when I enter the room  Head:  2 lacerations to scalp, one 1cm with moderate underlying hematoma and one .5cm laceration, face, and head appear normal  Eyes:  Conjunctivae are normal  ENT:    The nose is normal    Pinnae are normal    External acoustic canals are normal  Neck:  Trachea midline, no midline tenderness to cervical spine   CV:  Pulses are normal   Resp:  No respiratory distress   Abdomen:      Soft, non-tender, non-distended  Musc:  Normal muscular tone    No major joint effusions    No asymmetric leg swelling    Ecchymosis over volar aspect of right wrist, non-tender, good ROM, no swelling    Skin:  No rash or lesions noted  Neuro:  Speech is normal and fluent. Face is symmetric.     Moving all extremities well.   Psych: Awake. Alert.  Normal affect.  Appropriate interactions.      Emergency Department Course   Imaging:  Head CT without contrast:  IMPRESSION: No evidence of acute intracranial hemorrhage, mass, or  herniation.  Report per radiology.      Procedures:     Laceration Repair      LACERATION:  A simple clean 1 cm laceration.    LOCATION:  Posterior Scalp.    FUNCTION:  Distally sensation, circulation, motor and tendon function are intact.    ANESTHESIA:  Local using Lidocaine 1% total of 3 mLs and LET - Topical.    PREPARATION:  Irrigation and Scrubbing with Normal " Saline and Shur Clens.    DEBRIDEMENT:  wound explored, no foreign body found.    CLOSURE:  Wound was closed with 3 Staples       Laceration Repair      LACERATION:  A simple clean 0.5 cm laceration.    LOCATION:  Posterior Scalp..    FUNCTION:  Distally sensation, circulation, motor and tendon function are intact.    ANESTHESIA:  Local using Lidocaine 1% total of 2 mLs and LET - Topical.    PREPARATION:  Irrigation and Scrubbing with Normal Saline and Shur Clens.    DEBRIDEMENT:  wound explored, no foreign body found.    CLOSURE:  Wound was closed with 2 Staples.    Interventions:  (1748) Lidocaine/Epinephrine/Tetracaine solution, topical  (2028) Tdap, 0.5 mL, IM    Emergency Department Course:  Nursing notes and vitals reviewed.  (1742) I performed an exam of the patient as documented above.    The patient was sent for a CT scan while in the emergency department, findings above.   I performed a laceration repair, as documented above.    Findings and plan explained to the patient. Patient discharged home with instructions regarding supportive care, medications, and reasons to return. The importance of close follow-up was reviewed.   Impression & Plan    Medical Decision Making:  Natividad Mcginnis is a 77 year old female who presents for evaluation following a head injury.  The injury was mechanical in nature.  The patient is not on any blood thinners.  The patient has no neurologic deficit.  The patient had no episodes of vomiting. The patient did suffer 2 lacerations to the posterior scalp which were closed as above. Imaging was discussed and obtained, based on risk stratification, patient comfort, and symptoms. Head CT was obtained which was negative. Given the mechanism of the injury, the lack of focal neurologic deficit, no LOC, no seizure activity, and negative imaging, I believe serious cranial pathology is unlikely.Head injury precautions provided. Careful return precautions given.The patient is comfortable  with discharge home and out-patient follow up.    Diagnosis:    ICD-10-CM   1. Laceration of scalp, initial encounter S01.01XA       Disposition:  Patient is discharged to home.          I, Johny Spicer, am serving as a scribe on 12/6/2017 at 5:42 PM to personally document services performed by Dr. Sinclair based on my observations and the provider's statements to me.         Johny Spicer  12/6/2017   [facility] EMERGENCY DEPARTMENT       Dia Sinclair MD  12/07/17 1056

## 2017-12-07 NOTE — DISCHARGE INSTRUCTIONS
Discharge Instructions  Laceration (Cut)    You were seen today for a laceration (cut).  Your doctor examined your laceration for any problems such a buried foreign body (like glass, a splinter, or gravel), or injury to blood vessels, tendons, and nerves.  Your doctor may have also rinsed and/or scrubbed your laceration to help prevent an infection.  Your laceration may have been closed with glue, staples or sutures (stitches).      It may not be possible to find all problems with your laceration on the first visit, and we can't always prevent infections.  Antibiotics are only given when the benefit is more than the risk, and don't prevent all infections. Some lacerations are too high risk to close, and are left open to heal.  All lacerations, no matter how expertly repaired, will cause scarring.    Return to the Emergency Department right away if:    You have more redness, swelling, pain, drainage (pus), a bad smell, or red streaking from your laceration.      You have a fever of 101oF or more.    You have bleeding that you can t stop at home. If your cut starts to bleed, hold pressure on the bleeding area with a clean cloth or put pressure over the bandage.  If the bleeding doesn t stop after using constant pressure for 30 minutes, you should return to the Emergency Department for further treatment.    An area past the laceration is cool, pale, or blue compared with the other side, or has a slower return of color when squeezed.    Your dressing seems too tight or starts to get uncomfortable or painful.    You have loss of normal function or use of an area, such as being unable to straighten or bend a finger normally.    You have a numb area past the laceration.    Return to the Emergency Department or see your regular doctor if:    The laceration starts to come open.     You have something coming out of the cut or a feeling that there is something in the laceration.    Your wound will not heal, or keeps breaking  "open. There can always be glass, wood, dirt or other things in any wound.  They won t always show up, even on x-rays.  If a wound doesn t heal, this may be why, and it is important to follow-up with your regular doctor.    Home Care:    Take your dressing off in 12 hours, or as instructed by your doctor, to check your laceration. Remove the dressing sooner if it seems too tight or painful, or if it is getting numb, tingly, or pale past the dressing.    Gently wash your laceration 2 times a day with clean cloth and soap.     It is okay to shower, but do not let the laceration soak in water.      If your laceration was closed with wound adhesive or strips: pat it dry and leave it open to the air.     For all other repairs: after you wash your laceration, or at least 2 times a day, apply bacitracin or other antibiotic ointment to the laceration, then cover it with a Band-Aid  or gauze.    Keep the laceration clean. Wear gloves or other protective clothing if you are around dirt.    Follow-up:    You need to follow-up with your regular doctor in 7 days.    Your sutures or staples need to be removed in 7 days. Schedule an appointment with your regular doctor to have this done.    Scars:  To help minimize scarring:    Wear sunscreen over the healed laceration when out in the sun.    Massage the area regularly.    You may use Vitamin E oil.    Wait a year.  Most scars will start to fade within a year.    Probiotics: If you have been given an antibiotic, you may want to also take a probiotic pill or eat yogurt with live cultures. Probiotics have \"good bacteria\" to help your intestines stay healthy. Studies have shown that probiotics help prevent diarrhea and other intestine problems (including C. diff infection) when you take antibiotics. You can buy these without a prescription in the pharmacy section of the store.     If you were given a prescription for medicine here today, be sure to read all of the information " (including the package insert) that comes with your prescription.  This will include important information about the medicine, its side effects, and any warnings that you need to know about.  The pharmacist who fills the prescription can provide more information and answer questions you may have about the medicine.  If you have questions or concerns that the pharmacist cannot address, please call or return to the Emergency Department.     Discharge Instructions  Head Injury    You have been seen today for a head injury. You were checked for serious problems, like bleeding on the brain, but these problems cannot always be found right away.  Due to this risk, you should not be alone for 24 hours after your injury.  Follow up with your regular physician in 3 days. If you are taking a blood thinner, such as aspirin, Pradaxa  (dabigatran), Coumadin  (warfarin), or Plavix  (clopidogrel), you are at especially high risk for immediate or delayed bleeding, and need to re-check with a physician in 24 hours, or sooner if any of the symptoms below happen.     Return to the Emergency Department if:    You are confused, have amnesia, or you are not acting right.    Your headache gets worse or you start to have a really bad headache even with your recommended treatment plan.    You vomit more than once.    You have a convulsion or seizure.    You have trouble walking.    You have weakness or paralysis in an arm or a leg.    You have blood or fluid coming from your ears or nose.    You have new symptoms or anything that worries you.    Sleeping:  It is okay for you to sleep, but someone should wake you up as instructed by your doctor, and someone should check on you at your usual time to wake up.     Activity:    Do not drive for at least 24 hours.    Do not drive if you have dizzy spells or trouble concentrating, or remembering things.    Do not return to any contact sports until cleared by your regular doctor.     Follow-up:  It  is very important that you make an appointment with your clinic and go to the appointment.  If you do not follow-up with your regular doctor, it may result in missing an important development which could result in permanent injury or disability and/or lasting pain.  If there is any problem keeping your appointment, call your doctor or return to the Emergency Department.    MORE INFORMATION:    Concussion:  A concussion is a minor head injury that may cause temporary problems with the way your brain works.  Some symptoms include:  confusion, amnesia, nausea and vomiting, dizziness, fatigue, memory or concentration problems, irritability and sleep problems.    CT Scans: Your evaluation today may have included a CT scan (CAT scan) to look for things like bleeding or a skull fracture (break).  CT scans involve radiation and too many CT scans can cause serious health problems like cancer, especially in children.  Because of this, your doctor may not have ordered a CT scan today if they think you are at low risk for a serious or life threatening problem.    If you were given a prescription for medicine here today, be sure to read all of the information (including the package insert) that comes with your prescription.  This will include important information about the medicine, its side effects, and any warnings that you need to know about.  The pharmacist who fills the prescription can provide more information and answer questions you may have about the medicine.  If you have questions or concerns that the pharmacist cannot address, please call or return to the Emergency Department. 2

## 2017-12-13 ENCOUNTER — OFFICE VISIT (OUTPATIENT)
Dept: NURSING | Facility: CLINIC | Age: 77
End: 2017-12-13
Payer: MEDICARE

## 2017-12-13 DIAGNOSIS — Z48.02 ENCOUNTER FOR STAPLE REMOVAL: Primary | ICD-10-CM

## 2017-12-13 PROCEDURE — 99207 ZZC NO CHARGE NURSE ONLY: CPT

## 2017-12-13 NOTE — PROGRESS NOTES
Natividad presents to the clinic today for  removal of sutures and staples.  The patient has had the sutures and staples in place for 7 days.    There has been no history of infection or drainage.    O: 5 sutures and staples are seen located on the back of head in 2 spots.  The wound is healing well with no signs of infection.    Tetanus status is up to date.    A: Suture and Staple removal.    P:  All sutures and staples were easily removed today.  Routine wound care discussed.  The patient will follow up as needed.  Bacitracin placed on wounds.  Advised pt to follow up with any concerns.    Mariah Lamas RN, BSN

## 2017-12-13 NOTE — MR AVS SNAPSHOT
After Visit Summary   12/13/2017    Natividad Mcginnis    MRN: [id number]           Patient Information     Date Of Birth          1940        Visit Information        Provider Department      12/13/2017 11:00 AM LV RN [facility]        Today's Diagnoses     Encounter for staple removal    -  1       Follow-ups after your visit        Who to contact     If you have questions or need follow up information about today's clinic visit or your schedule please contact [facility] directly at [phone].  Normal or non-critical lab and imaging results will be communicated to you by Multis[employer]hart, letter or phone within 4 business days after the clinic has received the results. If you do not hear from us within 7 days, please contact the clinic through [employer] or phone. If you have a critical or abnormal lab result, we will notify you by phone as soon as possible.  Submit refill requests through [employer] or call your pharmacy and they will forward the refill request to us. Please allow 3 business days for your refill to be completed.          Additional Information About Your Visit        MyChart Information     [employer] gives you secure access to your electronic health record. If you see a primary care provider, you can also send messages to your care team and make appointments. If you have questions, please call your primary care clinic.  If you do not have a primary care provider, please call [phone] and they will assist you.        Care EveryWhere ID     This is your Care EveryWhere ID. This could be used by other organizations to access your [place] medical records  [id number]         Blood Pressure from Last 3 Encounters:   12/06/17 (!) 201/82   10/10/17 143/80   08/07/17 140/80    Weight from Last 3 Encounters:   12/06/17 146 lb (66.2 kg)   10/10/17 146 lb 14.4 oz (66.6 kg)   08/07/17 134 lb 12.8 oz (61.1 kg)              Today, you had the following     No orders found  for display       Primary Care Provider Office Phone # Fax #    Staci Ann Aaseby-Aguilera, PA-C [phone] [address]  [facility] [address]        Equal Access to Services     JIMMIE RUIZ : Hadcass aguilar jordano Soomaali, waaxda luqadaha, qaybta kaalmada adeegyada, julian benin hayaajami cohnnai davis tiara mendosa. So [facility] [phone].    ATENCIÓN: Si habla español, tiene a valentine disposición servicios gratuitos de asistencia lingüística. Llame al [phone].    We comply with applicable federal civil rights laws and [place] laws. We do not discriminate on the basis of race, color, national origin, age, disability, sex, sexual orientation, or gender identity.            Thank you!     Thank you for choosing [facility]  for your care. Our goal is always to provide you with excellent care. Hearing back from our patients is one way we can continue to improve our services. Please take a few minutes to complete the written survey that you may receive in the mail after your visit with us. Thank you!             Your Updated Medication List - Protect others around you: Learn how to safely use, store and throw away your medicines at www.disposemymeds.org.          This list is accurate as of: 12/13/17 11:05 AM.  Always use your most recent med list.                   Brand Name Dispense Instructions for use Diagnosis    alclomethasone 0.05 % ointment    ACLOVATE     Apply topically 2 times daily        calcium 600/vitamin D 600-400 MG-UNIT per tablet   Generic drug:  calcium-vitamin D     60 tablet    Take 1 tablet by mouth 2 times daily.        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        risperiDONE 0.5 MG tablet    risperDAL    90 tablet    TAKE 1 TABLET BY MOUTH DAILY AT BEDTIME.    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

## 2018-02-05 ENCOUNTER — OFFICE VISIT (OUTPATIENT)
Dept: FAMILY MEDICINE | Facility: CLINIC | Age: 78
End: 2018-02-05
Payer: MEDICARE

## 2018-02-05 VITALS
OXYGEN SATURATION: 99 % | HEART RATE: 85 BPM | DIASTOLIC BLOOD PRESSURE: 80 MMHG | TEMPERATURE: 97.8 F | SYSTOLIC BLOOD PRESSURE: 188 MMHG | WEIGHT: 127.2 LBS | BODY MASS INDEX: 23.41 KG/M2 | HEIGHT: 62 IN

## 2018-02-05 DIAGNOSIS — F33.3 SEVERE RECURRENT MAJOR DEPRESSIVE DISORDER WITH PSYCHOTIC FEATURES (H): ICD-10-CM

## 2018-02-05 DIAGNOSIS — F41.9 ANXIETY: Primary | ICD-10-CM

## 2018-02-05 PROCEDURE — 99213 OFFICE O/P EST LOW 20 MIN: CPT | Performed by: PHYSICIAN ASSISTANT

## 2018-02-05 RX ORDER — SERTRALINE HYDROCHLORIDE 100 MG/1
100 TABLET, FILM COATED ORAL DAILY
Qty: 30 TABLET | Refills: 1 | Status: SHIPPED | OUTPATIENT
Start: 2018-02-05 | End: 2018-07-02

## 2018-02-05 NOTE — NURSING NOTE
"Chief Complaint   Patient presents with     Recheck Medication       Initial /80 (BP Location: Right arm, Patient Position: Chair, Cuff Size: Adult Regular)  Pulse 85  Temp 97.8  F (36.6  C) (Oral)  Ht 5' 2\" (1.575 m)  Wt 127 lb 3.2 oz (57.7 kg)  SpO2 99%  BMI 23.27 kg/m2 Estimated body mass index is 23.27 kg/(m^2) as calculated from the following:    Height as of this encounter: 5' 2\" (1.575 m).    Weight as of this encounter: 127 lb 3.2 oz (57.7 kg).  Medication Reconciliation: complete      Health Maintenance addressed:  NONE    n/a      "

## 2018-02-05 NOTE — PROGRESS NOTES
SUBJECTIVE:   Natividad Mcginnis is a 77 year old female who presents to clinic today for the following health issues:      Medication Followup of fluoxetine    Taking Medication as prescribed: yes    Side Effects:  none    Medication Helping Symptoms:  Been working -  Insurance wont cover it - want to know if she can take something else           Problem list and histories reviewed & adjusted, as indicated.  Additional history: as documented    Current Outpatient Prescriptions   Medication Sig Dispense Refill     sertraline (ZOLOFT) 100 MG tablet Take 1 tablet (100 mg) by mouth daily 30 tablet 1     risperiDONE (RISPERDAL) 0.5 MG tablet TAKE 1 TABLET BY MOUTH DAILY AT BEDTIME. 90 tablet 3     FLUoxetine (PROZAC) 10 MG tablet Take 1 tablet (10 mg) by mouth daily 90 tablet 3     gabapentin (NEURONTIN) 300 MG capsule Take 1 capsule (300 mg) by mouth 3 times daily 270 capsule 3     alclomethasone (ACLOVATE) 0.05 % ointment Apply topically 2 times daily       levothyroxine (SYNTHROID, LEVOTHROID) 88 MCG tablet   0     fish oil-omega-3 fatty acids 1000 MG capsule Take 2 g by mouth daily       clobetasol (TEMOVATE) 0.05 % ointment Apply topically 2 times daily       multivitamin, therapeutic with minerals (MULTI-VITAMIN) TABS Take 1 tablet by mouth daily. 100 tablet 3     calcium-vitamin D (CALCIUM 600/VITAMIN D) 600-400 MG-UNIT per tablet Take 1 tablet by mouth 2 times daily. 60 tablet      BP Readings from Last 3 Encounters:   02/05/18 188/80   12/06/17 (!) 201/82   10/10/17 143/80    Wt Readings from Last 3 Encounters:   02/05/18 127 lb 3.2 oz (57.7 kg)   12/06/17 146 lb (66.2 kg)   10/10/17 146 lb 14.4 oz (66.6 kg)                    Reviewed and updated as needed this visit by clinical staff  Allergies       Reviewed and updated as needed this visit by Provider         ROS:  Constitutional, HEENT, cardiovascular, pulmonary, gi and gu systems are negative, except as otherwise noted.    OBJECTIVE:                        "                             /80 (BP Location: Right arm, Patient Position: Chair, Cuff Size: Adult Regular)  Pulse 85  Temp 97.8  F (36.6  C) (Oral)  Ht 5' 2\" (1.575 m)  Wt 127 lb 3.2 oz (57.7 kg)  SpO2 99%  BMI 23.27 kg/m2  Body mass index is 23.27 kg/(m^2).  GENERAL APPEARANCE: healthy, alert and no distress  RESP: lungs clear to auscultation - no rales, rhonchi or wheezes  CV: regular rates and rhythm, normal S1 S2, no S3 or S4 and no murmur, click or rub  PSYCH: mentation appears normal and affect normal/bright    Diagnostic test results:  Diagnostic Test Results:  none      ASSESSMENT/PLAN:                                                    1. Anxiety    - sertraline (ZOLOFT) 100 MG tablet; Take 1 tablet (100 mg) by mouth daily  Dispense: 30 tablet; Refill: 1    2. 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

## 2018-02-05 NOTE — MR AVS SNAPSHOT
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  Normal or non-critical lab and imaging results will be communicated to you by [employer], letter or phone within 4 business days after the clinic has received the results. If you do not hear from us within 7 days, please contact the clinic through [employer] or phone. If you have a critical or abnormal lab result, we will notify you by phone as soon as possible.  Submit refill requests through [employer] or call your pharmacy and they will forward the refill request to us. Please allow 3 business days for your refill to be completed.          Additional Information About Your Visit        MyChart Information     [employer] gives you secure access to your electronic health record. If you see a primary care provider, you can also send messages to your care team and make appointments. If you have questions, please call your primary care clinic.  If you do not have a primary care provider, please call [phone] and they will assist you.        Care EveryWhere ID     This is your  "Care EveryWhere ID. This could be used by other organizations to access your [place] medical records  [id number]        Your Vitals Were     Pulse Temperature Height Pulse Oximetry BMI (Body Mass Index)       85 97.8  F (36.6  C) (Oral) 5' 2\" (1.575 m) 99% 23.27 kg/m2        Blood Pressure from Last 3 Encounters:   02/05/18 188/80   12/06/17 (!) 201/82   10/10/17 143/80    Weight from Last 3 Encounters:   02/05/18 127 lb 3.2 oz (57.7 kg)   12/06/17 146 lb (66.2 kg)   10/10/17 146 lb 14.4 oz (66.6 kg)              Today, you had the following     No orders found for display         Today's Medication Changes          These changes are accurate as of 2/5/18 12:13 PM.  If you have any questions, ask your nurse or doctor.               Start taking these medicines.        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)   Started by:  Aaseby-Aguilera, Ramona Ann, PA-C        Dose:  100 mg   Take 1 tablet (100 mg) by mouth daily   Quantity:  30 tablet   Refills:  1            Where to get your medicines      These medications were sent to [id number] [address] [address]     Phone:  [phone]     sertraline 100 MG tablet                Primary Care Provider Office Phone # Fax #    Ramona Ann Aaseby-Aguilera, PA-C [phone] [address] [facility] [address]        Equal Access to Services     Sequoia HospitalREINA AH: Hadii marii cash Soklever, waaxda luqadaha, qaybta kaalmada adenaiyadoretha, julian mendosa. So [facility] [phone].    ATENCIÓN: Si habla español, tiene a valentine disposición servicios gratuitos de asistencia lingüística. Llame al [phone].    We comply with applicable federal civil rights laws and [place] laws. We do not discriminate on the basis of race, color, national " origin, age, disability, sex, sexual orientation, or gender identity.            Thank you!     Thank you for choosing [facility]  for your care. Our goal is always to provide you with excellent care. Hearing back from our patients is one way we can continue to improve our services. Please take a few minutes to complete the written survey that you may receive in the mail after your visit with us. Thank you!             Your Updated Medication List - Protect others around you: Learn how to safely use, store and throw away your medicines at www.disposemymeds.org.          This list is accurate as of 2/5/18 12:13 PM.  Always use your most recent med list.                   Brand Name Dispense Instructions for use Diagnosis    alclomethasone 0.05 % ointment    ACLOVATE     Apply topically 2 times daily        calcium 600/vitamin D 600-400 MG-UNIT per tablet   Generic drug:  calcium-vitamin D     60 tablet    Take 1 tablet by mouth 2 times daily.        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        risperiDONE 0.5 MG tablet    risperDAL    90 tablet    TAKE 1 TABLET BY MOUTH DAILY AT BEDTIME.    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

## 2018-02-05 NOTE — PATIENT INSTRUCTIONS
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

## 2018-02-06 ASSESSMENT — PATIENT HEALTH QUESTIONNAIRE - PHQ9: SUM OF ALL RESPONSES TO PHQ QUESTIONS 1-9: 5

## 2018-02-27 ENCOUNTER — TELEPHONE (OUTPATIENT)
Dept: FAMILY MEDICINE | Facility: CLINIC | Age: 78
End: 2018-02-27

## 2018-02-27 NOTE — TELEPHONE ENCOUNTER
"The Pt reports that the medication is \"working ok.\" Seems to help as much as Prozac and other SSRI medications she has tried in the past.   The Pt is currently taking 50 mg (half a tablet) daily. Some loose stool to begin with, that has resolved since then.   Please advise.     Peyton Andre RN -- [facility]         "

## 2018-03-21 ENCOUNTER — TRANSFERRED RECORDS (OUTPATIENT)
Dept: HEALTH INFORMATION MANAGEMENT | Facility: CLINIC | Age: 78
End: 2018-03-21

## 2018-03-21 LAB — TSH SERPL-ACNC: 0.91 UIU/ML (ref 0.3–5)

## 2018-06-27 DIAGNOSIS — F41.9 ANXIETY: ICD-10-CM

## 2018-06-27 DIAGNOSIS — F33.3 SEVERE RECURRENT MAJOR DEPRESSIVE DISORDER WITH PSYCHOTIC FEATURES (H): ICD-10-CM

## 2018-06-27 RX ORDER — SERTRALINE HYDROCHLORIDE 100 MG/1
TABLET, FILM COATED ORAL
Qty: 30 TABLET | Refills: 0 | OUTPATIENT
Start: 2018-06-27

## 2018-06-27 NOTE — TELEPHONE ENCOUNTER
Spoke with patient and scheduled for MOnday to follow her mammo appt for med check.  She has enough to get to her appt as she has only been taking 50 mg but her insurance requires her to get the 100mg and cut them in half.    Mariah Lamas RN, BSN

## 2018-06-27 NOTE — TELEPHONE ENCOUNTER
"Requested Prescriptions   Pending Prescriptions Disp Refills     sertraline (ZOLOFT) 100 MG tablet [Pharmacy Med Name: Sertraline HCl Oral Tablet 100 MG] 30 tablet 0    Last Written Prescription Date:  02/05/2018  Last Fill Quantity: 30 tablet,  # refills: 1   Last office visit: 2/5/2018 with prescribing provider:  02/05/2018   Future Office Visit:     Sig: TAKE ONE TABLET BY MOUTH DAILY.    SSRIs Protocol Failed    6/27/2018  9:24 AM       Failed - PHQ-9 score less than 5 in past 6 months    Please review last PHQ-9 score.     PHQ-9 SCORE 11/28/2016 8/7/2017 2/5/2018   Total Score 1 1 5     MADISON-7 SCORE 6/14/2016 11/28/2016 8/7/2017   Total Score - - -   Total Score 2 3 1            Passed - Patient is age 18 or older       Passed - No active pregnancy on record       Passed - No positive pregnancy test in last 12 months       Passed - Recent (6 mo) or future (30 days) visit within the authorizing provider's specialty    Patient had office visit in the last 6 months or has a visit in the next 30 days with authorizing provider or within the authorizing provider's specialty.  See \"Patient Info\" tab in inbasket, or \"Choose Columns\" in Meds & Orders section of the refill encounter.              Timmy Wooten XRT  "

## 2018-06-27 NOTE — TELEPHONE ENCOUNTER
Pt past due for follow up.  Needs OV and need to know if pt is taking a half tablet or a whole     Mariah Lamas RN, BSN

## 2018-07-02 ENCOUNTER — OFFICE VISIT (OUTPATIENT)
Dept: FAMILY MEDICINE | Facility: CLINIC | Age: 78
End: 2018-07-02
Payer: MEDICARE

## 2018-07-02 ENCOUNTER — RADIANT APPOINTMENT (OUTPATIENT)
Dept: MAMMOGRAPHY | Facility: CLINIC | Age: 78
End: 2018-07-02
Attending: PHYSICIAN ASSISTANT
Payer: MEDICARE

## 2018-07-02 VITALS
WEIGHT: 136.8 LBS | DIASTOLIC BLOOD PRESSURE: 90 MMHG | TEMPERATURE: 98 F | SYSTOLIC BLOOD PRESSURE: 164 MMHG | HEIGHT: 61 IN | HEART RATE: 74 BPM | RESPIRATION RATE: 16 BRPM | OXYGEN SATURATION: 98 % | BODY MASS INDEX: 25.83 KG/M2

## 2018-07-02 DIAGNOSIS — F33.3 SEVERE RECURRENT MAJOR DEPRESSIVE DISORDER WITH PSYCHOTIC FEATURES (H): ICD-10-CM

## 2018-07-02 DIAGNOSIS — Z12.31 VISIT FOR SCREENING MAMMOGRAM: ICD-10-CM

## 2018-07-02 DIAGNOSIS — E78.5 HYPERLIPIDEMIA LDL GOAL <160: ICD-10-CM

## 2018-07-02 DIAGNOSIS — I10 BENIGN ESSENTIAL HYPERTENSION: Primary | ICD-10-CM

## 2018-07-02 DIAGNOSIS — F41.9 ANXIETY: ICD-10-CM

## 2018-07-02 LAB
ALBUMIN SERPL-MCNC: 4 G/DL (ref 3.4–5)
ALP SERPL-CCNC: 87 U/L (ref 40–150)
ALT SERPL W P-5'-P-CCNC: 25 U/L (ref 0–50)
ANION GAP SERPL CALCULATED.3IONS-SCNC: 10 MMOL/L (ref 3–14)
AST SERPL W P-5'-P-CCNC: 18 U/L (ref 0–45)
BILIRUB SERPL-MCNC: 0.3 MG/DL (ref 0.2–1.3)
BUN SERPL-MCNC: 16 MG/DL (ref 7–30)
CALCIUM SERPL-MCNC: 9.5 MG/DL (ref 8.5–10.1)
CHLORIDE SERPL-SCNC: 106 MMOL/L (ref 94–109)
CHOLEST SERPL-MCNC: 227 MG/DL
CO2 SERPL-SCNC: 23 MMOL/L (ref 20–32)
CREAT SERPL-MCNC: 0.5 MG/DL (ref 0.52–1.04)
ERYTHROCYTE [DISTWIDTH] IN BLOOD BY AUTOMATED COUNT: 13.7 % (ref 10–15)
GFR SERPL CREATININE-BSD FRML MDRD: >90 ML/MIN/1.7M2
GLUCOSE SERPL-MCNC: 82 MG/DL (ref 70–99)
HCT VFR BLD AUTO: 39.2 % (ref 35–47)
HDLC SERPL-MCNC: 81 MG/DL
HGB BLD-MCNC: 13 G/DL (ref 11.7–15.7)
LDLC SERPL CALC-MCNC: 111 MG/DL
MCH RBC QN AUTO: 29.6 PG (ref 26.5–33)
MCHC RBC AUTO-ENTMCNC: 33.2 G/DL (ref 31.5–36.5)
MCV RBC AUTO: 89 FL (ref 78–100)
NONHDLC SERPL-MCNC: 146 MG/DL
PLATELET # BLD AUTO: 231 10E9/L (ref 150–450)
POTASSIUM SERPL-SCNC: 4 MMOL/L (ref 3.4–5.3)
PROT SERPL-MCNC: 7.9 G/DL (ref 6.8–8.8)
RBC # BLD AUTO: 4.39 10E12/L (ref 3.8–5.2)
SODIUM SERPL-SCNC: 139 MMOL/L (ref 133–144)
TRIGL SERPL-MCNC: 174 MG/DL
TSH SERPL DL<=0.005 MIU/L-ACNC: 2.36 MU/L (ref 0.4–4)
WBC # BLD AUTO: 7.5 10E9/L (ref 4–11)

## 2018-07-02 PROCEDURE — 99214 OFFICE O/P EST MOD 30 MIN: CPT | Performed by: PHYSICIAN ASSISTANT

## 2018-07-02 PROCEDURE — 82043 UR ALBUMIN QUANTITATIVE: CPT | Performed by: PHYSICIAN ASSISTANT

## 2018-07-02 PROCEDURE — 80061 LIPID PANEL: CPT | Performed by: PHYSICIAN ASSISTANT

## 2018-07-02 PROCEDURE — 85027 COMPLETE CBC AUTOMATED: CPT | Performed by: PHYSICIAN ASSISTANT

## 2018-07-02 PROCEDURE — 84443 ASSAY THYROID STIM HORMONE: CPT | Performed by: PHYSICIAN ASSISTANT

## 2018-07-02 PROCEDURE — 77067 SCR MAMMO BI INCL CAD: CPT | Mod: TC

## 2018-07-02 PROCEDURE — 80053 COMPREHEN METABOLIC PANEL: CPT | Performed by: PHYSICIAN ASSISTANT

## 2018-07-02 PROCEDURE — 36415 COLL VENOUS BLD VENIPUNCTURE: CPT | Performed by: PHYSICIAN ASSISTANT

## 2018-07-02 RX ORDER — LISINOPRIL 10 MG/1
10 TABLET ORAL DAILY
Qty: 30 TABLET | Refills: 1 | Status: SHIPPED | OUTPATIENT
Start: 2018-07-02 | End: 2018-08-06 | Stop reason: DRUGHIGH

## 2018-07-02 RX ORDER — RISPERIDONE 0.5 MG/1
TABLET ORAL
Qty: 90 TABLET | Refills: 3 | Status: SHIPPED | OUTPATIENT
Start: 2018-07-02 | End: 2019-08-21

## 2018-07-02 RX ORDER — GABAPENTIN 300 MG/1
300 CAPSULE ORAL 3 TIMES DAILY
Qty: 270 CAPSULE | Refills: 3 | Status: SHIPPED | OUTPATIENT
Start: 2018-07-02 | End: 2019-04-16

## 2018-07-02 RX ORDER — SERTRALINE HYDROCHLORIDE 100 MG/1
100 TABLET, FILM COATED ORAL DAILY
Qty: 90 TABLET | Refills: 1 | Status: SHIPPED | OUTPATIENT
Start: 2018-07-02 | End: 2019-06-11

## 2018-07-02 ASSESSMENT — ANXIETY QUESTIONNAIRES
7. FEELING AFRAID AS IF SOMETHING AWFUL MIGHT HAPPEN: NOT AT ALL
5. BEING SO RESTLESS THAT IT IS HARD TO SIT STILL: NOT AT ALL
GAD7 TOTAL SCORE: 0
3. WORRYING TOO MUCH ABOUT DIFFERENT THINGS: NOT AT ALL
6. BECOMING EASILY ANNOYED OR IRRITABLE: NOT AT ALL
IF YOU CHECKED OFF ANY PROBLEMS ON THIS QUESTIONNAIRE, HOW DIFFICULT HAVE THESE PROBLEMS MADE IT FOR YOU TO DO YOUR WORK, TAKE CARE OF THINGS AT HOME, OR GET ALONG WITH OTHER PEOPLE: NOT DIFFICULT AT ALL
2. NOT BEING ABLE TO STOP OR CONTROL WORRYING: NOT AT ALL
1. FEELING NERVOUS, ANXIOUS, OR ON EDGE: NOT AT ALL

## 2018-07-02 ASSESSMENT — PATIENT HEALTH QUESTIONNAIRE - PHQ9: 5. POOR APPETITE OR OVEREATING: NOT AT ALL

## 2018-07-02 NOTE — PROGRESS NOTES
SUBJECTIVE:   Natividad Mcginnis is a 77 year old female who presents to clinic today for the following health issues:    Depression and Anxiety Follow-Up    Status since last visit: Improved    Other associated symptoms:None    Complicating factors:     Significant life event: Yes-  Pt's son  within the last couple of months      Current substance abuse: None    PHQ-9 2016   Total Score 1 1 5   Q9: Suicide Ideation Not at all Not at all Not at all     MADISON-7 SCORE 2016   Total Score - - -   Total Score 2 3 1       PHQ-9  English  PHQ-9   Any Language  MADISON-7  Suicide Assessment Five-step Evaluation and Treatment (SAFE-T)    Amount of exercise or physical activity: None    Problems taking medications regularly: No    Medication side effects: none    Diet: regular (no restrictions)      Medication Followup of sertraline     Taking Medication as prescribed: yes    Side Effects:  None    Medication Helping Symptoms:  yes     Also, she has additional complaints of bp has been consistantly elevated.  Denies symptoms of headache, sob, chest pain or other .    Problem list and histories reviewed & adjusted, as indicated.  Additional history: as documented    Current Outpatient Prescriptions   Medication Sig Dispense Refill     alclomethasone (ACLOVATE) 0.05 % ointment Apply topically 2 times daily       calcium-vitamin D (CALCIUM 600/VITAMIN D) 600-400 MG-UNIT per tablet Take 1 tablet by mouth 2 times daily. 60 tablet      fish oil-omega-3 fatty acids 1000 MG capsule Take 2 g by mouth daily       gabapentin (NEURONTIN) 300 MG capsule Take 1 capsule (300 mg) by mouth 3 times daily 270 capsule 3     levothyroxine (SYNTHROID, LEVOTHROID) 88 MCG tablet   0     lisinopril (PRINIVIL/ZESTRIL) 10 MG tablet Take 1 tablet (10 mg) by mouth daily 30 tablet 1     multivitamin, therapeutic with minerals (MULTI-VITAMIN) TABS Take 1 tablet by mouth daily. 100 tablet 3     risperiDONE  "(RISPERDAL) 0.5 MG tablet TAKE 1 TABLET BY MOUTH DAILY AT BEDTIME. 90 tablet 3     sertraline (ZOLOFT) 100 MG tablet Take 1 tablet (100 mg) by mouth daily 90 tablet 1     clobetasol (TEMOVATE) 0.05 % ointment Apply topically 2 times daily       [DISCONTINUED] gabapentin (NEURONTIN) 300 MG capsule Take 1 capsule (300 mg) by mouth 3 times daily 270 capsule 3     [DISCONTINUED] risperiDONE (RISPERDAL) 0.5 MG tablet TAKE 1 TABLET BY MOUTH DAILY AT BEDTIME. 90 tablet 3     [DISCONTINUED] sertraline (ZOLOFT) 100 MG tablet Take 1 tablet (100 mg) by mouth daily 30 tablet 1     BP Readings from Last 3 Encounters:   07/02/18 164/90   02/05/18 188/80   12/06/17 (!) 201/82    Wt Readings from Last 3 Encounters:   07/02/18 136 lb 12.8 oz (62.1 kg)   02/05/18 127 lb 3.2 oz (57.7 kg)   12/06/17 146 lb (66.2 kg)                    Reviewed and updated as needed this visit by clinical staff  Tobacco  Allergies  Meds  Med Hx  Surg Hx  Fam Hx  Soc Hx      Reviewed and updated as needed this visit by Provider         ROS:  Constitutional, HEENT, cardiovascular, pulmonary, gi and gu systems are negative, except as otherwise noted.    OBJECTIVE:                                                    /90 (BP Location: Right arm, Patient Position: Chair, Cuff Size: Adult Regular)  Pulse 74  Temp 98  F (36.7  C) (Oral)  Resp 16  Ht 5' 0.5\" (1.537 m)  Wt 136 lb 12.8 oz (62.1 kg)  SpO2 98%  BMI 26.28 kg/m2  Body mass index is 26.28 kg/(m^2).  GENERAL APPEARANCE: healthy, alert and no distress  HENT: ear canals and TM's normal and nose and mouth without ulcers or lesions  RESP: lungs clear to auscultation - no rales, rhonchi or wheezes  CV: regular rates and rhythm, normal S1 S2, no S3 or S4 and no murmur, click or rub  LYMPHATICS: no cervical adenopathy  ABDOMEN: soft, nontender, without hepatosplenomegaly or masses and bowel sounds normal  MS: extremities normal- no gross deformities noted  MENTAL STATUS " EXAM:  Appearance/Behavior: No apparent distress and Neatly groomed  Speech: Normal  Mood/Affect: normal affect  Insight: Adequate    Diagnostic test results:  Diagnostic Test Results:  none      ASSESSMENT/PLAN:                                                    1. 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 Son recently   - gabapentin (NEURONTIN) 300 MG capsule; Take 1 capsule (300 mg) by mouth 3 times daily  Dispense: 270 capsule; Refill: 3  - risperiDONE (RISPERDAL) 0.5 MG tablet; TAKE 1 TABLET BY MOUTH DAILY AT BEDTIME.  Dispense: 90 tablet; Refill: 3  - sertraline (ZOLOFT) 100 MG tablet; Take 1 tablet (100 mg) by mouth daily  Dispense: 90 tablet; Refill: 1    2. Anxiety  Stable   - sertraline (ZOLOFT) 100 MG tablet; Take 1 tablet (100 mg) by mouth daily  Dispense: 90 tablet; Refill: 1    3. Benign essential hypertension  Stable   - lisinopril (PRINIVIL/ZESTRIL) 10 MG tablet; Take 1 tablet (10 mg) by mouth daily  Dispense: 30 tablet; Refill: 1  - CBC with platelets  - TSH with free T4 reflex  - Comprehensive metabolic panel  - Lipid panel reflex to direct LDL Fasting  - Albumin Random Urine Quantitative with Creat Ratio    4. Hyperlipidemia LDL goal <160    - Comprehensive metabolic panel  - Lipid panel reflex to direct LDL Fasting      Patient Instructions   (I10) Benign essential hypertension  (primary encounter diagnosis)  Comment:  Will have come in for nurse only BP check x 2 and then follow-up for office visit in 1 month with BP diary.      Plan: lisinopril (PRINIVIL/ZESTRIL) 10 MG tablet, CBC        with platelets, TSH with free T4 reflex,         Comprehensive metabolic panel, Lipid panel         reflex to direct LDL Fasting, Albumin Random         Urine Quantitative with Creat Ratio          Please take lisinopril in am daily.      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

## 2018-07-02 NOTE — MR AVS SNAPSHOT
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      Plan: lisinopril (PRINIVIL/ZESTRIL) 10 MG tablet, CBC        with platelets, TSH with free T4 reflex,         Comprehensive metabolic panel, Lipid panel         reflex to direct LDL Fasting, Albumin Random         Urine Quantitative with Creat Ratio          Please take lisinopril in am daily.      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      Your next 10 appointments already scheduled     Jul 16, 2018 11:00 AM CDT   Nurse Only with HERMINIA VAZ/LPN   [facility]    [address] [address]   [phone]            Jul 30, 2018 11:00 AM CDT   Nurse Only with HERMINIA VAZ/LPN   [facility]  [facility]    [address] [address] [phone]            Aug 06, 2018 11:00 AM CDT   SHORT with Ramona Ann Aaseby-Aguilera, PA-C   [facility]    [address] [address] [phone]              Who to contact     If you have questions or need follow up information about today's clinic visit or your schedule please contact [facility] directly at [phone].  Normal or non-critical lab and imaging results will be communicated to you by Vibrowhart, letter or phone within 4 business days after the clinic has received the results. If you do not hear from us within 7 days, please contact the clinic through Pheedot or phone. If you have a critical or abnormal lab result, we will notify you by phone as soon as possible.  Submit refill requests through eMindful or call your pharmacy and they will forward the refill request to us. Please allow 3 business days for your refill to be completed.          Additional Information About Your Visit        MyChart Information     eMindful gives you secure access to your electronic health record. If you see a primary care provider, you can also send messages to your care team and make appointments. If you have questions, please call your primary care clinic.  If you do not have a primary care provider, please call [phone] and they will assist you.        Care EveryWhere ID     This is your Care EveryWhere ID. This could be used by other organizations to access your [facility] medical records  [id number]        Your Vitals Were     Pulse Temperature Respirations Height Pulse Oximetry BMI (Body Mass Index)    74 98  F (36.7  C) (Oral) 16 5' 0.5\" (1.537 m) 98% 26.28 kg/m2       Blood Pressure from Last 3 Encounters:   07/02/18 164/90   02/05/18 188/80   12/06/17 (!) 201/82    Weight from Last 3 Encounters:   07/02/18 136 lb 12.8 oz (62.1 kg)   02/05/18 127 lb 3.2 oz " (57.7 kg)   12/06/17 146 lb (66.2 kg)              We Performed the Following     Albumin Random Urine Quantitative with Creat Ratio     CBC with platelets     Comprehensive metabolic panel     Lipid panel reflex to direct LDL Fasting     TSH with free T4 reflex          Today's Medication Changes          These changes are accurate as of 7/2/18 11:59 AM.  If you have any questions, ask your nurse or doctor.               Start taking these medicines.        Dose/Directions    lisinopril 10 MG tablet   Commonly known as:  PRINIVIL/ZESTRIL   Used for:  Benign essential hypertension   Started by:  Aaseby-Aguilera, Ramona Ann, PA-C        Dose:  10 mg   Take 1 tablet (10 mg) by mouth daily   Quantity:  30 tablet   Refills:  1            Where to get your medicines      These medications were sent to [id number] [address] [address]     Phone:  [phone]     gabapentin 300 MG capsule    lisinopril 10 MG tablet    risperiDONE 0.5 MG tablet    sertraline 100 MG tablet                Primary Care Provider Office Phone # Fax #    Ramona Ann Aaseby-Aguilera, PA-C [phone] [address] [facility] [address]        Equal Access to Services     JIMMIE RUIZ AH: Hadii marii aguilar hadasho Soomaali, waaxda luqadaha, qaybta kaalmada adeegyada, julian mendosa. So [facility] [phone].    ATENCIÓN: Si habla español, tiene a valentine disposición servicios gratuitos de asistencia lingüística. Tristaname al [phone].    We comply with applicable federal civil rights laws and [place] laws. We do not discriminate on the basis of race, color, national origin, age, disability, sex, sexual orientation, or gender identity.            Thank you!     Thank you for choosing [facility]  for your care. Our goal is always to provide you with excellent care. Hearing back from our patients is one way we can continue to  improve our services. Please take a few minutes to complete the written survey that you may receive in the mail after your visit with us. Thank you!             Your Updated Medication List - Protect others around you: Learn how to safely use, store and throw away your medicines at www.disposemymeds.org.          This list is accurate as of 7/2/18 11:59 AM.  Always use your most recent med list.                   Brand Name Dispense Instructions for use Diagnosis    alclomethasone 0.05 % ointment    ACLOVATE     Apply topically 2 times daily        calcium 600/vitamin D 600-400 MG-UNIT per tablet   Generic drug:  calcium-vitamin D     60 tablet    Take 1 tablet by mouth 2 times daily.        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        risperiDONE 0.5 MG tablet    risperDAL    90 tablet    TAKE 1 TABLET BY MOUTH DAILY AT BEDTIME.    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

## 2018-07-02 NOTE — PATIENT INSTRUCTIONS
(I10) Benign essential hypertension  (primary encounter diagnosis)  Comment:  Will have come in for nurse only BP check x 2 and then follow-up for office visit in 1 month with BP diary.      Plan: lisinopril (PRINIVIL/ZESTRIL) 10 MG tablet, CBC        with platelets, TSH with free T4 reflex,         Comprehensive metabolic panel, Lipid panel         reflex to direct LDL Fasting, Albumin Random         Urine Quantitative with Creat Ratio          Please take lisinopril in am daily.      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

## 2018-07-03 LAB
CREAT UR-MCNC: 15 MG/DL
MICROALBUMIN UR-MCNC: 33 MG/L
MICROALBUMIN/CREAT UR: 222.3 MG/G CR (ref 0–25)

## 2018-07-03 ASSESSMENT — ANXIETY QUESTIONNAIRES: GAD7 TOTAL SCORE: 0

## 2018-07-03 ASSESSMENT — PATIENT HEALTH QUESTIONNAIRE - PHQ9: SUM OF ALL RESPONSES TO PHQ QUESTIONS 1-9: 1

## 2018-07-13 PROBLEM — F41.9 ANXIETY: Status: ACTIVE | Noted: 2018-07-13

## 2018-07-16 ENCOUNTER — ALLIED HEALTH/NURSE VISIT (OUTPATIENT)
Dept: NURSING | Facility: CLINIC | Age: 78
End: 2018-07-16
Payer: MEDICARE

## 2018-07-16 ENCOUNTER — TELEPHONE (OUTPATIENT)
Dept: FAMILY MEDICINE | Facility: CLINIC | Age: 78
End: 2018-07-16

## 2018-07-16 VITALS — DIASTOLIC BLOOD PRESSURE: 66 MMHG | SYSTOLIC BLOOD PRESSURE: 146 MMHG

## 2018-07-16 DIAGNOSIS — Z01.30 BP CHECK: Primary | ICD-10-CM

## 2018-07-16 PROCEDURE — 99207 ZZC NO CHARGE NURSE ONLY: CPT

## 2018-07-16 NOTE — TELEPHONE ENCOUNTER
"Vital Signs 12/6/2017 12/6/2017 12/6/2017 2/5/2018   Systolic 201   188   Diastolic 82   80   Pulse    85   Temperature    97.8   Respirations       Weight (LB)    127 lb 3.2 oz   Height    5' 2\"   BMI (Calculated)    23.31   O2  96 94 99     Vital Signs 7/2/2018 7/16/2018   Systolic 164 146   Diastolic 90 66   Pulse 74    Temperature 98    Respirations 16    Weight (LB) 136 lb 12.8 oz    Height 5' 0.5\"    BMI (Calculated) 26.33    O2 98      Pt sat for 12 minutes and took on right arm at 1115am on 6/16/2018, IT was high, I spoke to Lily, but pt was free to go due to BP trending down.Pawan Larsen CMA    "

## 2018-07-30 ENCOUNTER — TELEPHONE (OUTPATIENT)
Dept: FAMILY MEDICINE | Facility: CLINIC | Age: 78
End: 2018-07-30

## 2018-07-30 ENCOUNTER — ALLIED HEALTH/NURSE VISIT (OUTPATIENT)
Dept: NURSING | Facility: CLINIC | Age: 78
End: 2018-07-30
Payer: MEDICARE

## 2018-07-30 VITALS — DIASTOLIC BLOOD PRESSURE: 70 MMHG | SYSTOLIC BLOOD PRESSURE: 164 MMHG

## 2018-07-30 DIAGNOSIS — Z01.30 BP CHECK: Primary | ICD-10-CM

## 2018-07-30 PROCEDURE — 99207 ZZC NO CHARGE NURSE ONLY: CPT

## 2018-07-30 NOTE — TELEPHONE ENCOUNTER
"Vital Signs 2/5/2018 7/2/2018 7/16/2018 7/30/2018   Systolic 188 164 146 164   Diastolic 80 90 66 70   Pulse 85 74     Temperature 97.8 98     Respirations  16     Weight (LB) 127 lb 3.2 oz 136 lb 12.8 oz     Height 5' 2\" 5' 0.5\"     BMI (Calculated) 23.31 26.33     O2 99 98       Pt sat for 10 minutes and took on right arm at 10am on 7/30/2018. Was up a bit, talked to Staci, Ok for now, Mulu has an appt with you on Monday 8/6 at 11am.Pawan Larsen CMA    "

## 2018-08-06 ENCOUNTER — OFFICE VISIT (OUTPATIENT)
Dept: FAMILY MEDICINE | Facility: CLINIC | Age: 78
End: 2018-08-06
Payer: MEDICARE

## 2018-08-06 VITALS
SYSTOLIC BLOOD PRESSURE: 170 MMHG | DIASTOLIC BLOOD PRESSURE: 83 MMHG | WEIGHT: 138.6 LBS | BODY MASS INDEX: 26.17 KG/M2 | HEART RATE: 82 BPM | HEIGHT: 61 IN | TEMPERATURE: 98.4 F | OXYGEN SATURATION: 99 %

## 2018-08-06 DIAGNOSIS — F33.3 SEVERE RECURRENT MAJOR DEPRESSIVE DISORDER WITH PSYCHOTIC FEATURES (H): ICD-10-CM

## 2018-08-06 DIAGNOSIS — I10 BENIGN ESSENTIAL HYPERTENSION: ICD-10-CM

## 2018-08-06 DIAGNOSIS — Z63.6 CAREGIVER BURDEN: Primary | ICD-10-CM

## 2018-08-06 PROCEDURE — 99214 OFFICE O/P EST MOD 30 MIN: CPT | Performed by: PHYSICIAN ASSISTANT

## 2018-08-06 RX ORDER — LISINOPRIL 20 MG/1
20 TABLET ORAL DAILY
Qty: 30 TABLET | Refills: 1 | Status: SHIPPED | OUTPATIENT
Start: 2018-08-06 | End: 2018-09-04

## 2018-08-06 SDOH — SOCIAL STABILITY - SOCIAL INSECURITY: DEPENDENT RELATIVE NEEDING CARE AT HOME: Z63.6

## 2018-08-06 NOTE — PROGRESS NOTES
SUBJECTIVE:   Natividad Mcginnis is a 77 year old female who presents to clinic today for the following health issues:      Medication Followup of lisinopril    Taking Medication as prescribed: yes    Side Effects:  None    Medication Helping Symptoms:  unsure       Didn't take lisinopril today.  Natividad reports no side effects to lisinopril.  Doesn't salt foods.  Drinks  A lot of fluids throughout the day.      Also, she has additional complaints of her  has alzheimer's and getting worse and harder for her to talk with him or get him to do the things he needs to do.  Follows her around.    No physical abuse.            Problem list and histories reviewed & adjusted, as indicated.  Additional history: as documented    Current Outpatient Prescriptions   Medication Sig Dispense Refill     alclomethasone (ACLOVATE) 0.05 % ointment Apply topically 2 times daily       calcium-vitamin D (CALCIUM 600/VITAMIN D) 600-400 MG-UNIT per tablet Take 1 tablet by mouth 2 times daily. 60 tablet      fish oil-omega-3 fatty acids 1000 MG capsule Take 2 g by mouth daily       gabapentin (NEURONTIN) 300 MG capsule Take 1 capsule (300 mg) by mouth 3 times daily 270 capsule 3     levothyroxine (SYNTHROID, LEVOTHROID) 88 MCG tablet   0     lisinopril (PRINIVIL/ZESTRIL) 20 MG tablet Take 1 tablet (20 mg) by mouth daily 30 tablet 1     multivitamin, therapeutic with minerals (MULTI-VITAMIN) TABS Take 1 tablet by mouth daily. 100 tablet 3     risperiDONE (RISPERDAL) 0.5 MG tablet TAKE 1 TABLET BY MOUTH DAILY AT BEDTIME. 90 tablet 3     sertraline (ZOLOFT) 100 MG tablet Take 1 tablet (100 mg) by mouth daily 90 tablet 1     [DISCONTINUED] lisinopril (PRINIVIL/ZESTRIL) 10 MG tablet Take 1 tablet (10 mg) by mouth daily 30 tablet 1     BP Readings from Last 3 Encounters:   08/06/18 170/83   07/30/18 164/70   07/16/18 146/66    Wt Readings from Last 3 Encounters:   08/06/18 138 lb 9.6 oz (62.9 kg)   07/02/18 136 lb 12.8 oz (62.1 kg)   02/05/18  "127 lb 3.2 oz (57.7 kg)                    Reviewed and updated as needed this visit by clinical staff  Allergies       Reviewed and updated as needed this visit by Provider         ROS:  Constitutional, HEENT, cardiovascular, pulmonary, gi and gu systems are negative, except as otherwise noted.    OBJECTIVE:                                                    /83 (BP Location: Right arm, Patient Position: Chair, Cuff Size: Adult Regular)  Pulse 82  Temp 98.4  F (36.9  C) (Oral)  Ht 5' 0.5\" (1.537 m)  Wt 138 lb 9.6 oz (62.9 kg)  SpO2 99%  BMI 26.62 kg/m2  Body mass index is 26.62 kg/(m^2).  GENERAL APPEARANCE: healthy, alert and no distress  HENT: ear canals and TM's normal and nose and mouth without ulcers or lesions  RESP: lungs clear to auscultation - no rales, rhonchi or wheezes  CV: regular rates and rhythm, normal S1 S2, no S3 or S4 and no murmur, click or rub  LYMPHATICS: no cervical adenopathy  MS: no pedal edema  PSYCH: affect normal        ASSESSMENT/PLAN:                                                    1. Benign essential hypertension    - lisinopril (PRINIVIL/ZESTRIL) 20 MG tablet; Take 1 tablet (20 mg) by mouth daily  Dispense: 30 tablet; Refill: 1    2. Caregiver burden  Advised we can get her help when the time comes.  She denies the need for help right now.         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

## 2018-08-06 NOTE — MR AVS SNAPSHOT
After Visit Summary   8/6/2018    Natividad Mcginnis    MRN: [id number]           Patient Information     Date Of Birth          1940        Visit Information        Provider Department      8/6/2018 11:00 AM Aaseby-Aguilera, Ramona Ann, PA-C [facility]        Today's Diagnoses     Caregiver burden    -  1    Benign essential hypertension          Care Instructions    (Z63.6) Caregiver burden  (primary encounter diagnosis)  Comment:   Plan: currently ok in her situation.   needs more guidance and wants to be around Natividad consistently.  Natividad denies any physical abuse.      (I10) Benign essential hypertension  Comment: did no take lisinopril today and BP is elevated.  Well increase to 20 mg.   Will have come in for nurse only BP check x 2 and then follow-up for office visit in 1 month with BP diary.      Plan: lisinopril (PRINIVIL/ZESTRIL) 20 MG tablet                      Follow-ups after your visit        Follow-up notes from your care team     Return in about 4 weeks (around 9/3/2018) for Routine Visit.      Who to contact     If you have questions or need follow up information about today's clinic visit or your schedule please contact [facility] directly at [phone].  Normal or non-critical lab and imaging results will be communicated to you by Pelican Imaginghart, letter or phone within 4 business days after the clinic has received the results. If you do not hear from us within 7 days, please contact the clinic through Pelican Imaginghart or phone. If you have a critical or abnormal lab result, we will notify you by phone as soon as possible.  Submit refill requests through Philz [employer] or call your pharmacy and they will forward the refill request to us. Please allow 3 business days for your refill to be completed.          Additional Information About Your Visit        Pelican Imaging[employer]Spring Information     Phil[employer] gives you secure access to your electronic health record. If you see a primary  "care provider, you can also send messages to your care team and make appointments. If you have questions, please call your primary care clinic.  If you do not have a primary care provider, please call [phone] and they will assist you.        Care EveryWhere ID     This is your Care EveryWhere ID. This could be used by other organizations to access your [facility] medical records  [id number]        Your Vitals Were     Pulse Temperature Height Pulse Oximetry BMI (Body Mass Index)       82 98.4  F (36.9  C) (Oral) 5' 0.5\" (1.537 m) 99% 26.62 kg/m2        Blood Pressure from Last 3 Encounters:   08/06/18 170/83   07/30/18 164/70   07/16/18 146/66    Weight from Last 3 Encounters:   08/06/18 138 lb 9.6 oz (62.9 kg)   07/02/18 136 lb 12.8 oz (62.1 kg)   02/05/18 127 lb 3.2 oz (57.7 kg)              Today, you had the following     No orders found for display         Today's Medication Changes          These changes are accurate as of 8/6/18 11:25 AM.  If you have any questions, ask your nurse or doctor.               These medicines have changed or have updated prescriptions.        Dose/Directions    lisinopril 20 MG tablet   Commonly known as:  PRINIVIL/ZESTRIL   This may have changed:    - medication strength  - how much to take   Used for:  Benign essential hypertension   Changed by:  Aaseby-Aguilera, Ramona Ann, PA-C        Dose:  20 mg   Take 1 tablet (20 mg) by mouth daily   Quantity:  30 tablet   Refills:  1         Stop taking these medicines if you haven't already. Please contact your care team if you have questions.     clobetasol 0.05 % ointment   Commonly known as:  TEMOVATE   Stopped by:  Aaseby-Aguilera, Ramona Ann, PA-C                Where to get your medicines      These medications were sent to [id number] [place], [place] - [address] [place] [id number]     Phone:  [phone]     lisinopril 20 MG tablet                Primary Care Provider Office " Phone # Fax #    Staci Ann Aaseby-Aguilera, PA-C [phone] [address]  [facility] [address]        Equal Access to Services     JIMMIE RUIZ : Emely marii aguilar jordano Soomaali, waaxda luqadaha, qaybta kaalmada adeegyada, julian davis laPrudencionerissa mendosa. So [facility] [phone].    ATENCIÓN: Si habla español, tiene a valentine disposición servicios gratuitos de asistencia lingüística. Llame al [phone].    We comply with applicable federal civil rights laws and [place] laws. We do not discriminate on the basis of race, color, national origin, age, disability, sex, sexual orientation, or gender identity.            Thank you!     Thank you for choosing [facility]  for your care. Our goal is always to provide you with excellent care. Hearing back from our patients is one way we can continue to improve our services. Please take a few minutes to complete the written survey that you may receive in the mail after your visit with us. Thank you!             Your Updated Medication List - Protect others around you: Learn how to safely use, store and throw away your medicines at www.disposemymeds.org.          This list is accurate as of 8/6/18 11:25 AM.  Always use your most recent med list.                   Brand Name Dispense Instructions for use Diagnosis    alclomethasone 0.05 % ointment    ACLOVATE     Apply topically 2 times daily        calcium 600/vitamin D 600-400 MG-UNIT per tablet   Generic drug:  calcium-vitamin D     60 tablet    Take 1 tablet by mouth 2 times daily.        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        risperiDONE 0.5 MG tablet    risperDAL    90 tablet    TAKE 1 TABLET BY MOUTH DAILY AT BEDTIME.    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

## 2018-08-06 NOTE — PATIENT INSTRUCTIONS
(Z63.6) Caregiver burden  (primary encounter diagnosis)  Comment:   Plan: currently ok in her situation.   needs more guidance and wants to be around Natividad consistently.  Natividad denies any physical abuse.      (I10) Benign essential hypertension  Comment: did no take lisinopril today and BP is elevated.  Well increase to 20 mg.   Will have come in for nurse only BP check x 2 and then follow-up for office visit in 1 month with BP diary.      Plan: lisinopril (PRINIVIL/ZESTRIL) 20 MG tablet

## 2018-08-13 ENCOUNTER — ALLIED HEALTH/NURSE VISIT (OUTPATIENT)
Dept: NURSING | Facility: CLINIC | Age: 78
End: 2018-08-13
Payer: MEDICARE

## 2018-08-13 VITALS — SYSTOLIC BLOOD PRESSURE: 130 MMHG | DIASTOLIC BLOOD PRESSURE: 80 MMHG

## 2018-08-13 DIAGNOSIS — Z01.30 BP CHECK: Primary | ICD-10-CM

## 2018-08-13 PROCEDURE — 99207 ZZC NO CHARGE NURSE ONLY: CPT

## 2018-08-13 NOTE — NURSING NOTE
Natividad Mcginnis is a 77 year old patient who comes in today for a Blood Pressure check.  Initial BP:  /80 (BP Location: Right arm, Patient Position: Chair, Cuff Size: Adult Regular)     Data Unavailable  Disposition: results routed to provider      BP Readings from Last 6 Encounters:   08/13/18 130/80   08/06/18 170/83   07/30/18 164/70   07/16/18 146/66   07/02/18 164/90   02/05/18 188/80     MARITZA Suggs

## 2018-08-13 NOTE — MR AVS SNAPSHOT
After Visit Summary   8/13/2018    Natividad Mcginnis    MRN: [id number]           Patient Information     Date Of Birth          1940        Visit Information        Provider Department      8/13/2018 10:00 AM LV MA/LPN [facility]        Today's Diagnoses     BP check    -  1       Follow-ups after your visit        Your next 10 appointments already scheduled     Aug 27, 2018 10:00 AM CDT   [facility]urse Only with [facility]LPN   [facility]    [address] [facility] [address] [phone]            Sep 04, 2018 10:00 AM CDT   SHORT with Ramona Ann Aaseby-Aguilera, PA-C   [facility]    [address] [facility] [address] [phone]              Who to contact     If you have questions or need follow up information about today's clinic visit or your schedule please contact [facility] directly at [phone].  Normal or non-critical lab and imaging results will be communicated to you by Drikhart, letter or phone within 4 business days after the clinic has received the results. If you do not hear from us within 7 days, please contact the clinic through Drikhart or phone. If you have a critical or abnormal lab result, we will notify you by phone as soon as possible.  Submit refill requests through O[employer]asis or call your pharmacy and they will forward the refill request to us. Please allow 3 business days for your refill to be completed.          Additional Information About Your Visit        MyChart Information     O[employer] gives you secure access to your electronic health record. If you see a primary care provider, you can also send messages to your care team and make appointments. If you have questions, please call your primary care clinic.  If you do not have a primary care provider, please call [phone] and they will assist you.        Care EveryWhere ID     This is  your Care EveryWhere ID. This could be used by other organizations to access your [employer] medical records  [id number]         Blood Pressure from Last 3 Encounters:   08/13/18 130/80   08/06/18 170/83   07/30/18 164/70    Weight from Last 3 Encounters:   08/06/18 138 lb 9.6 oz (62.9 kg)   07/02/18 136 lb 12.8 oz (62.1 kg)   02/05/18 127 lb 3.2 oz (57.7 kg)              Today, you had the following     No orders found for display       Primary Care Provider Office Phone # Fax #    Staci Ann Aaseby-Aguilera, PA-C [phone] [address]  [facility] [address]        Equal Access to Services     JIMMIE RUIZ : Hadii aad ku hadasho Soomaali, waaxda luqadaha, qaybta kaalmada adeegyada, julian menjivar . So [facility] [phone].    ATENCIÓN: Si habla español, tiene a valentine disposición servicios gratuitos de asistencia lingüística. Llame al [phone].    We comply with applicable federal civil rights laws and [place] laws. We do not discriminate on the basis of race, color, national origin, age, disability, sex, sexual orientation, or gender identity.            Thank you!     Thank you for choosing [facility]  for your care. Our goal is always to provide you with excellent care. Hearing back from our patients is one way we can continue to improve our services. Please take a few minutes to complete the written survey that you may receive in the mail after your visit with us. Thank you!             Your Updated Medication List - Protect others around you: Learn how to safely use, store and throw away your medicines at www.disposemymeds.org.          This list is accurate as of 8/13/18 10:10 AM.  Always use your most recent med list.                   Brand Name Dispense Instructions for use Diagnosis    alclomethasone 0.05 % ointment    ACLOVATE     Apply topically 2 times daily        calcium 600/vitamin D 600-400 MG-UNIT per tablet   Generic drug:   calcium-vitamin D     60 tablet    Take 1 tablet by mouth 2 times daily.        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        risperiDONE 0.5 MG tablet    risperDAL    90 tablet    TAKE 1 TABLET BY MOUTH DAILY AT BEDTIME.    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

## 2018-08-27 ENCOUNTER — ALLIED HEALTH/NURSE VISIT (OUTPATIENT)
Dept: NURSING | Facility: CLINIC | Age: 78
End: 2018-08-27
Payer: MEDICARE

## 2018-08-27 ENCOUNTER — TELEPHONE (OUTPATIENT)
Dept: FAMILY MEDICINE | Facility: CLINIC | Age: 78
End: 2018-08-27

## 2018-08-27 VITALS — SYSTOLIC BLOOD PRESSURE: 136 MMHG | DIASTOLIC BLOOD PRESSURE: 78 MMHG

## 2018-08-27 DIAGNOSIS — Z01.30 BP CHECK: Primary | ICD-10-CM

## 2018-08-27 PROCEDURE — 99207 ZZC NO CHARGE NURSE ONLY: CPT

## 2018-08-27 NOTE — TELEPHONE ENCOUNTER
"Vital Signs 7/2/2018 7/16/2018 7/30/2018 8/6/2018   Systolic 164 146 164 170   Diastolic 90 66 70 83   Pulse 74   82   Temperature 98   98.4   Respirations 16      Weight (LB) 136 lb 12.8 oz   138 lb 9.6 oz   Height 5' 0.5\"   5' 0.5\"   BMI (Calculated) 26.33   26.68   O2 98   99     Vital Signs 8/13/2018 8/13/2018 8/27/2018   Systolic 140 130 136   Diastolic 80 80 78   Pulse      Temperature      Respirations      Weight (LB)      Height      BMI (Calculated)      O2        Pt sat for 10 minutes and took on right arm at 10:10am on 8/27/2018.Pawan Larsen CMA    "

## 2018-09-04 ENCOUNTER — OFFICE VISIT (OUTPATIENT)
Dept: FAMILY MEDICINE | Facility: CLINIC | Age: 78
End: 2018-09-04
Payer: MEDICARE

## 2018-09-04 VITALS
BODY MASS INDEX: 25.11 KG/M2 | TEMPERATURE: 98.5 F | DIASTOLIC BLOOD PRESSURE: 80 MMHG | HEART RATE: 80 BPM | RESPIRATION RATE: 16 BRPM | HEIGHT: 61 IN | WEIGHT: 133 LBS | OXYGEN SATURATION: 96 % | SYSTOLIC BLOOD PRESSURE: 138 MMHG

## 2018-09-04 DIAGNOSIS — I10 BENIGN ESSENTIAL HYPERTENSION: ICD-10-CM

## 2018-09-04 DIAGNOSIS — F41.9 ANXIETY: Primary | ICD-10-CM

## 2018-09-04 PROCEDURE — 99213 OFFICE O/P EST LOW 20 MIN: CPT | Performed by: PHYSICIAN ASSISTANT

## 2018-09-04 RX ORDER — LISINOPRIL 20 MG/1
20 TABLET ORAL DAILY
Qty: 90 TABLET | Refills: 1 | Status: SHIPPED | OUTPATIENT
Start: 2018-09-04 | End: 2019-03-05

## 2018-09-04 NOTE — PATIENT INSTRUCTIONS
(F41.9) Anxiety  (primary encounter diagnosis)  Comment:   Plan: stable but concerned about  with dementia.     (I10) Benign essential hypertension  Comment: stable and doing well.  Follow-up in 6 months   Plan: lisinopril (PRINIVIL/ZESTRIL) 20 MG tablet

## 2018-09-04 NOTE — PROGRESS NOTES
"  SUBJECTIVE:   Natividad Mcginnis is a 78 year old female who presents to clinic today for the following health issues:      Hypertension Follow-up      Outpatient blood pressures nurse only visits    Low Salt Diet: no added salt      Amount of exercise or physical activity: None    Problems taking medications regularly: No    Medication side effects: none    Diet: watch what she eats            Problem list and histories reviewed & adjusted, as indicated.  Additional history: as documented    Allergies   Allergen Reactions     Hydrocortisone Valerate      reactioin     Latex      Bad reaction     Nickel Sulfate      reaction     No Clinical Screening - See Comments      Reaction to Jasamine absolute, Thiruam mix, Quaternium, gold, Amidoamine, Glutaraldehyde ----BAD REACTION     Shampoos [Dhs]      clairol shampoo     Tert-Butylhydroquinone      Bad reaction     BP Readings from Last 3 Encounters:   09/04/18 138/80   08/27/18 136/78   08/13/18 130/80    Wt Readings from Last 3 Encounters:   09/04/18 133 lb (60.3 kg)   08/06/18 138 lb 9.6 oz (62.9 kg)   07/02/18 136 lb 12.8 oz (62.1 kg)                    Reviewed and updated as needed this visit by clinical staff       Reviewed and updated as needed this visit by Provider         ROS:  Constitutional, HEENT, cardiovascular, pulmonary, gi and gu systems are negative, except as otherwise noted.    OBJECTIVE:                                                    /80 (BP Location: Right arm, Patient Position: Chair, Cuff Size: Adult Regular)  Pulse 80  Temp 98.5  F (36.9  C) (Oral)  Resp 16  Ht 5' 0.5\" (1.537 m)  Wt 133 lb (60.3 kg)  SpO2 96%  Breastfeeding? No  BMI 25.55 kg/m2  Body mass index is 25.55 kg/(m^2).  GENERAL APPEARANCE: healthy, alert and no distress  HENT: ear canals and TM's normal and nose and mouth without ulcers or lesions  RESP: lungs clear to auscultation - no rales, rhonchi or wheezes  CV: regular rates and rhythm, normal S1 S2, no S3 or " S4 and no murmur, click or rub  MS: extremities normal- no gross deformities noted    Diagnostic test results:  Diagnostic Test Results:  none      ASSESSMENT/PLAN:                                                    1. Anxiety  stable    2. Benign essential hypertension  Stable   - lisinopril (PRINIVIL/ZESTRIL) 20 MG tablet; Take 1 tablet (20 mg) by mouth daily  Dispense: 90 tablet; Refill: 1      See Patient Instructions    Ramona Ann Aaseby-Aguilera, PA-C  [facility]  Patient Instructions   (F41.9) Anxiety  (primary encounter diagnosis)  Comment:   Plan: stable but concerned about  with dementia.     (I10) Benign essential hypertension  Comment: stable and doing well.  Follow-up in 6 months   Plan: lisinopril (PRINIVIL/ZESTRIL) 20 MG tablet

## 2018-09-04 NOTE — MR AVS SNAPSHOT
After Visit Summary   9/4/2018    Natividad Mcginnis    MRN: [id number]           Patient Information     Date Of Birth          1940        Visit Information        Provider Department      9/4/2018 10:00 AM Aaseby-Aguilera, Ramona Ann, PA-C [facility]        Today's Diagnoses     Anxiety    -  1    Benign essential hypertension          Care Instructions    (F41.9) Anxiety  (primary encounter diagnosis)  Comment:   Plan: stable but concerned about  with dementia.     (I10) Benign essential hypertension  Comment: stable and doing well.  Follow-up in 6 months   Plan: lisinopril (PRINIVIL/ZESTRIL) 20 MG tablet                      Follow-ups after your visit        Follow-up notes from your care team     Return in about 6 months (around 3/4/2019) for Routine Visit.      Who to contact     If you have questions or need follow up information about today's clinic visit or your schedule please contact [facility] directly at [phone].  Normal or non-critical lab and imaging results will be communicated to you by Airborne Technologyhart, letter or phone within 4 business days after the clinic has received the results. If you do not hear from us within 7 days, please contact the clinic through YellowBrckt or phone. If you have a critical or abnormal lab result, we will notify you by phone as soon as possible.  Submit refill requests through ZIIBRA or call your pharmacy and they will forward the refill request to us. Please allow 3 business days for your refill to be completed.          Additional Information About Your Visit        MyChart Information     ZIIBRA gives you secure access to your electronic health record. If you see a primary care provider, you can also send messages to your care team and make appointments. If you have questions, please call your primary care clinic.  If you do not have a primary care provider, please call [phone] and they will assist you.       "  Care EveryWhere ID     This is your Care EveryWhere ID. This could be used by other organizations to access your [place] medical records  [id number]        Your Vitals Were     Pulse Temperature Respirations Height Pulse Oximetry Breastfeeding?    80 98.5  F (36.9  C) (Oral) 16 5' 0.5\" (1.537 m) 96% No    BMI (Body Mass Index)                   25.55 kg/m2            Blood Pressure from Last 3 Encounters:   09/04/18 138/80   08/27/18 136/78   08/13/18 130/80    Weight from Last 3 Encounters:   09/04/18 133 lb (60.3 kg)   08/06/18 138 lb 9.6 oz (62.9 kg)   07/02/18 136 lb 12.8 oz (62.1 kg)              We Performed the Following     DEPRESSION ACTION PLAN (DAP)          Where to get your medicines      These medications were sent to [id number] [address] [address]     Phone:  [phone]     lisinopril 20 MG tablet          Primary Care Provider Office Phone # Fax #    Staci Ann Aaseby-Aguilera, PA-C [phone] [address] [facility] [facility] [address]        Equal Access to Services     JIMMIE RUIZ : Hadii aad ku hadasho Soomaali, waaxda luqadaha, qaybta kaalmada adeegyada, julian mendosa. So [facility] [phone].    ATENCIÓN: Si habla español, tiene a valentine disposición servicios gratuitos de asistencia lingüística. Nino al [phone].    We comply with applicable federal civil rights laws and [place] laws. We do not discriminate on the basis of race, color, national origin, age, disability, sex, sexual orientation, or gender identity.            Thank you!     Thank you for choosing [facility]  for your care. Our goal is always to provide you with excellent care. Hearing back from our patients is one way we can continue to improve our services. Please take a few minutes to complete the written survey that you may receive in the mail after your visit with us. Thank you!      "        Your Updated Medication List - Protect others around you: Learn how to safely use, store and throw away your medicines at www.disposemymeds.org.          This list is accurate as of 9/4/18 10:13 AM.  Always use your most recent med list.                   Brand Name Dispense Instructions for use Diagnosis    alclomethasone 0.05 % ointment    ACLOVATE     Apply topically 2 times daily        calcium 600/vitamin D 600-400 MG-UNIT per tablet   Generic drug:  calcium carbonate 600 mg-vitamin D 400 units     60 tablet    Take 1 tablet by mouth 2 times daily.        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        risperiDONE 0.5 MG tablet    risperDAL    90 tablet    TAKE 1 TABLET BY MOUTH DAILY AT BEDTIME.    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

## 2018-09-17 PROBLEM — I10 HTN (HYPERTENSION): Status: ACTIVE | Noted: 2018-09-17

## 2018-09-24 ENCOUNTER — OFFICE VISIT (OUTPATIENT)
Dept: OBGYN | Facility: CLINIC | Age: 78
End: 2018-09-24
Payer: MEDICARE

## 2018-09-24 VITALS
SYSTOLIC BLOOD PRESSURE: 152 MMHG | WEIGHT: 129.5 LBS | DIASTOLIC BLOOD PRESSURE: 90 MMHG | HEIGHT: 61 IN | BODY MASS INDEX: 24.45 KG/M2

## 2018-09-24 DIAGNOSIS — N90.4 LICHEN SCLEROSUS ET ATROPHICUS OF THE VULVA: Primary | ICD-10-CM

## 2018-09-24 PROCEDURE — 99213 OFFICE O/P EST LOW 20 MIN: CPT | Performed by: FAMILY MEDICINE

## 2018-09-24 NOTE — PATIENT INSTRUCTIONS
Return in 3-6 months for a physical     Dr. Maida Corrales,     Obstetrics and Gynecology  [facility] [place] [facility] [place]

## 2018-09-24 NOTE — PROGRESS NOTES
SUBJECTIVE:  Natividad Mcginnis is an 78 year old  woman who presents for   Gyn follow-up exam. She was seen on 2015, for lichen sclerosus.   Last time her treatment was clobetasol ointment & referral to dermatology.       - Pt states her dermatologist wanted her to discontinue the alclometasone ointment, as they were concerned that she was using a steroid cream for an extended time. She thought this was weird, as she thought it was something she would always be on. She did trial off of the cream for the last 3 weeks & states she did not experience any symptoms but was unsure if they would eventually return.         Past Medical History:   Diagnosis Date     Anxiety      Depressive disorder      Thyroid disease           Family History   Problem Relation Age of Onset     Breast Cancer Mother      ? in her 40's     Cancer Mother 49     lung cancer     Cancer Father      lung cancer     HEART DISEASE Father      hardening of arteries     Colon Cancer Sister      1 -dxed at age of 75     Cancer - colorectal Sister      Heart Defect Son 52       Past Surgical History:   Procedure Laterality Date     3        APPENDECTOMY      as a child      COLONOSCOPY       COLONOSCOPY N/A 10/6/2014    Procedure: COLONOSCOPY;  Surgeon: Billie Araiza MD;  Location: [facility]     TUBAL LIGATION       US BREAST BIOPSY VACUUM LEFT      normal, whlie ago       Current Outpatient Prescriptions   Medication     alclomethasone (ACLOVATE) 0.05 % ointment     calcium-vitamin D (CALCIUM 600/VITAMIN D) 600-400 MG-UNIT per tablet     fish oil-omega-3 fatty acids 1000 MG capsule     gabapentin (NEURONTIN) 300 MG capsule     levothyroxine (SYNTHROID, LEVOTHROID) 88 MCG tablet     lisinopril (PRINIVIL/ZESTRIL) 20 MG tablet     multivitamin, therapeutic with minerals (MULTI-VITAMIN) TABS     risperiDONE (RISPERDAL) 0.5 MG tablet     sertraline (ZOLOFT) 100 MG tablet     No current facility-administered medications for this visit.   "    Allergies   Allergen Reactions     Hydrocortisone Valerate      reactioin     Latex      Bad reaction     Nickel Sulfate      reaction     No Clinical Screening - See Comments      Reaction to Jasamine absolute, Thiruam mix, Quaternium, gold, Amidoamine, Glutaraldehyde ----BAD REACTION     Shampoos [Dhs]      clairol shampoo     Tert-Butylhydroquinone      Bad reaction       Social History   Substance Use Topics     Smoking status: Former Smoker     Types: Cigarettes     Smokeless tobacco: Never Used      Comment: quit 1993     Alcohol use 1.8 oz/week     3 Glasses of wine per week       Review Of Systems  Ears/Nose/Throat: negative  Respiratory: No shortness of breath, dyspnea on exertion, cough, or hemoptysis  Cardiovascular: negative  Gastrointestinal: negative  Genitourinary: negative  Constitutional, HEENT, cardiovascular, pulmonary, GI, , musculoskeletal, neuro, skin, endocrine and psych systems are negative, except as otherwise noted.      This document serves as a record of the services and decisions personally performed and made by Maida Corrales DO. It was created on her behalf by Gertrude Gay, a trained medical scribe. The creation of this document is based the provider's statements to the medical scribe.  Scribe Gertrude Gay 8:48 AM, September 24, 2018    OBJECTIVE:  /90  Ht 1.537 m (5' 0.5\")  Wt 58.7 kg (129 lb 8 oz)  BMI 24.87 kg/m2  General appearance: healthy, alert and no distress  Skin: Skin color, texture, turgor normal. No rashes or lesions.  Lungs: negative, Percussion normal. Good diaphragmatic excursion. Lungs clear  Heart: negative, PMI normal. No lifts, heaves, or thrills. RRR. No murmurs, clicks gallops or rub  Pelvic: Clitoris obliterated with whitening; Pink & thinned skin, slight whitening on right labia majora, obliteration if labia minora; Otherwise external genitalia and vagina normal. Bimanual and rectovaginal normal.          ASSESSMENT:  Natividda Mcginnis is an 78 year " old  woman who presents for   Gyn follow-up exam. She was seen on 2015, for lichen sclerosus.    PLAN:  Dx: Lichen sclerosus  1)  Informed that this condition does not resolve over time, she will likely need to apply the medicated ointment at least 2-3 times/week, but if dermatology is ok with non-treating and she has not symptoms, ok to do drug holiday   - Advised to return to daily application if symptoms return, otherwise the labia will be reevaluated in 6 months & a plan will be determined  2)  Return to clinic in 3-6 months for annual gyn physical & lichen sclerosus follow-up      Rx:  None      The information in this document, created by the medical scribe for me, accurately reflects the services I personally performed and the decisions made by me. I have reviewed and approved this document for accuracy prior to leaving the patient care area.  8:48 AM, 18    Dr. Maida Corrales, DO    OB/GYN   [facility]

## 2018-09-24 NOTE — MR AVS SNAPSHOT
After Visit Summary   9/24/2018    Natividad Mcginnis    MRN: [id number]           Patient Information     Date Of Birth          1940        Visit Information        Provider Department      9/24/2018 8:45 AM Maida Corrales, DO [facility]        Today's Diagnoses     Lichen sclerosus et atrophicus of the vulva    -  1      Care Instructions    Return in 3 months for a physical     Dr. Maida Corrales, DO    Obstetrics and Gynecology  [facility] [place]                 Follow-ups after your visit        Follow-up notes from your care team     Return in about 6 months (around 3/24/2019) for Gyn physical.      Who to contact     If you have questions or need follow up information about today's clinic visit or your schedule please contact [facility] directly at [phone].  Normal or non-critical lab and imaging results will be communicated to you by MyChart, letter or phone within 4 business days after the clinic has received the results. If you do not hear from us within 7 days, please contact the clinic through MyChart or phone. If you have a critical or abnormal lab result, we will notify you by phone as soon as possible.  Submit refill requests through Skoodat or call your pharmacy and they will forward the refill request to us. Please allow 3 business days for your refill to be completed.          Additional Information About Your Visit        MyChart Information     Skoodat gives you secure access to your electronic health record. If you see a primary care provider, you can also send messages to your care team and make appointments. If you have questions, please call your primary care clinic.  If you do not have a primary care provider, please call [phone] and they will assist you.        Care EveryWhere ID     This is your Care EveryWhere ID. This could be used by other organizations to access your [facility]  "records  [id number]        Your Vitals Were     Height BMI (Body Mass Index)                5' 0.5\" (1.537 m) 24.87 kg/m2           Blood Pressure from Last 3 Encounters:   09/24/18 152/90   09/04/18 138/80   08/27/18 136/78    Weight from Last 3 Encounters:   09/24/18 129 lb 8 oz (58.7 kg)   09/04/18 133 lb (60.3 kg)   08/06/18 138 lb 9.6 oz (62.9 kg)              Today, you had the following     No orders found for display       Primary Care Provider Office Phone # Fax #    Staci Ann Aaseby-Aguilera, PA-C [phone] [address]  [facility] [address]        Equal Access to Services     [facility]: Hadii marii ortegao Soklever, waaxda luqadaha, qaybta kaalmada royal, julian menjivar . So [facility] [phone].    ATENCIÓN: Si habla español, tiene a valentine disposición servicios gratuitos de asistencia lingüística. Nino al [phone].    We comply with applicable federal civil rights laws and [place] laws. We do not discriminate on the basis of race, color, national origin, age, disability, sex, sexual orientation, or gender identity.            Thank you!     Thank you for choosing [facility]  for your care. Our goal is always to provide you with excellent care. Hearing back from our patients is one way we can continue to improve our services. Please take a few minutes to complete the written survey that you may receive in the mail after your visit with us. Thank you!             Your Updated Medication List - Protect others around you: Learn how to safely use, store and throw away your medicines at www.disposemymeds.org.          This list is accurate as of 9/24/18  9:06 AM.  Always use your most recent med list.                   Brand Name Dispense Instructions for use Diagnosis    alclomethasone 0.05 % ointment    ACLOVATE     Apply topically 2 times daily        calcium 600/vitamin D 600-400 MG-UNIT per tablet   Generic drug:  calcium " carbonate 600 mg-vitamin D 400 units     60 tablet    Take 1 tablet by mouth 2 times daily.        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        risperiDONE 0.5 MG tablet    risperDAL    90 tablet    TAKE 1 TABLET BY MOUTH DAILY AT BEDTIME.    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

## 2018-09-24 NOTE — NURSING NOTE
"Chief Complaint   Patient presents with     RECHECK     follow up to Lichen Sclerosis--doing good--trying to wean off of ointment       Initial /90  Ht 5' 0.5\" (1.537 m)  Wt 129 lb 8 oz (58.7 kg)  BMI 24.87 kg/m2 Estimated body mass index is 24.87 kg/(m^2) as calculated from the following:    Height as of this encounter: 5' 0.5\" (1.537 m).    Weight as of this encounter: 129 lb 8 oz (58.7 kg).  BP completed using cuff size: regular        The following HM Due: NONE      "

## 2018-12-03 ENCOUNTER — OFFICE VISIT (OUTPATIENT)
Dept: OBGYN | Facility: CLINIC | Age: 78
End: 2018-12-03
Payer: MEDICARE

## 2018-12-03 VITALS
WEIGHT: 118.9 LBS | HEIGHT: 61 IN | BODY MASS INDEX: 22.45 KG/M2 | DIASTOLIC BLOOD PRESSURE: 90 MMHG | SYSTOLIC BLOOD PRESSURE: 150 MMHG

## 2018-12-03 DIAGNOSIS — Z00.00 ENCOUNTER FOR PREVENTATIVE ADULT HEALTH CARE EXAMINATION: Primary | ICD-10-CM

## 2018-12-03 DIAGNOSIS — L90.0 LICHEN SCLEROSUS: ICD-10-CM

## 2018-12-03 DIAGNOSIS — Z12.31 ENCOUNTER FOR SCREENING MAMMOGRAM FOR MALIGNANT NEOPLASM OF BREAST: ICD-10-CM

## 2018-12-03 PROCEDURE — 99397 PER PM REEVAL EST PAT 65+ YR: CPT | Performed by: FAMILY MEDICINE

## 2018-12-03 PROCEDURE — G0101 CA SCREEN;PELVIC/BREAST EXAM: HCPCS | Performed by: FAMILY MEDICINE

## 2018-12-03 RX ORDER — ALCLOMETASONE DIPROPIONATE 0.5 MG/G
OINTMENT TOPICAL DAILY
Qty: 60 G | Refills: 11 | Status: SHIPPED | OUTPATIENT
Start: 2018-12-03 | End: 2020-01-27

## 2018-12-03 NOTE — NURSING NOTE
"Chief Complaint   Patient presents with     Gyn Exam     pap not indicated--had mammo 20--follow up Lichen Sclerosis--using the medication once a day       Initial /90  Ht 5' 0.5\" (1.537 m)  Wt 118 lb 14.4 oz (53.9 kg)  BMI 22.84 kg/m2 Estimated body mass index is 22.84 kg/(m^2) as calculated from the following:    Height as of this encounter: 5' 0.5\" (1.537 m).    Weight as of this encounter: 118 lb 14.4 oz (53.9 kg).  BP completed using cuff size: regular    Questioned patient about current smoking habits.  Pt. quit smoking some time ago.          The following HM Due: NONE             "

## 2018-12-03 NOTE — PROGRESS NOTES
SUBJECTIVE:  Natividad Mcginnis is an 78 year old  postmenopausal woman   who presents for annual gyn exam.  No bleeding, spotting, or discharge noted.     Estrogen replacement therapy: never  TED exposure: no  History of abnormal Pap smear: No  Family history of uterine or ovarian cancer: No  Regular self breast exam: Yes  History of abnormal mammogram: No  Family history of breast cancer: Yes - Mother  History of abnormal lipids: Yes - Hyperlipidemia      - Pt is doing well, expresses no concerns at today's visit.      Past Medical History:   Diagnosis Date     Anxiety      Depressive disorder      Thyroid disease           Family History   Problem Relation Age of Onset     Breast Cancer Mother      ? in her 40's     Cancer Mother 49     lung cancer     Cancer Father      lung cancer     Heart Disease Father      hardening of arteries     Colon Cancer Sister      1 -dxed at age of 75     Cancer - colorectal Sister      Heart Defect Son 52       Past Surgical History:   Procedure Laterality Date     3        APPENDECTOMY      as a child      COLONOSCOPY       COLONOSCOPY N/A 10/6/2014    Procedure: COLONOSCOPY;  Surgeon: Billie Araiza MD;  Location: [facility]     TUBAL LIGATION       US BREAST BIOPSY VACUUM LEFT      normal, whlie ago       Current Outpatient Prescriptions   Medication     alclomethasone (ACLOVATE) 0.05 % ointment     calcium-vitamin D (CALCIUM 600/VITAMIN D) 600-400 MG-UNIT per tablet     fish oil-omega-3 fatty acids 1000 MG capsule     gabapentin (NEURONTIN) 300 MG capsule     levothyroxine (SYNTHROID, LEVOTHROID) 88 MCG tablet     lisinopril (PRINIVIL/ZESTRIL) 20 MG tablet     multivitamin, therapeutic with minerals (MULTI-VITAMIN) TABS     risperiDONE (RISPERDAL) 0.5 MG tablet     sertraline (ZOLOFT) 100 MG tablet     No current facility-administered medications for this visit.      Allergies   Allergen Reactions     Hydrocortisone Valerate      reactioin     Latex      Bad reaction      "Nickel Sulfate      reaction     No Clinical Screening - See Comments      Reaction to Jasamine absolute, Thiruam mix, Quaternium, gold, Amidoamine, Glutaraldehyde ----BAD REACTION     Shampoos [Dhs]      clairol shampoo     Tert-Butylhydroquinone      Bad reaction       Social History   Substance Use Topics     Smoking status: Former Smoker     Types: Cigarettes     Smokeless tobacco: Never Used      Comment: quit 1993     Alcohol use 1.8 oz/week     3 Glasses of wine per week       Review Of Systems  Ears/Nose/Throat: negative  Respiratory: No shortness of breath, dyspnea on exertion, cough, or hemoptysis  Cardiovascular: negative  Gastrointestinal: negative  Genitourinary: negative  Constitutional, HEENT, cardiovascular, pulmonary, GI, , musculoskeletal, neuro, skin, endocrine and psych systems are negative, except as otherwise noted.        This document serves as a record of the services and decisions personally performed and made by Maida Corrales DO. It was created on her behalf by Gertrude Gay, a trained medical scribe. The creation of this document is based the provider's statements to the medical scribe.  Scribe Gertrude Gay 1:22 PM, December 3, 2018    OBJECTIVE:  /90  Ht 1.537 m (5' 0.5\")  Wt 53.9 kg (118 lb 14.4 oz)  BMI 22.84 kg/m2  General appearance: healthy, alert and no distress  Skin: Skin color, texture, turgor normal. No rashes or lesions.  Ears: negative  Nose/Sinuses: Nares normal. Septum midline. Mucosa normal. No drainage or sinus tenderness.  Oropharynx: Lips, mucosa, and tongue normal. Teeth and gums normal.  Neck: Neck supple. No adenopathy. Thyroid symmetric, normal size,, Carotids without bruits.  Lungs: negative, Percussion normal. Good diaphragmatic excursion. Lungs clear  Heart: negative, PMI normal. No lifts, heaves, or thrills. RRR. No murmurs, clicks gallops or rub  Breasts: Inspection negative. No nipple discharge or bleeding. No masses.  Abdomen: Abdomen soft, " non-tender. BS normal. No masses, organomegaly  Pelvic: Pelvic examination without pap/ Gonorrhea and Chlamydia   including  External genitalia normal   and vagina normal rugatted moderately atrophic  Examination of urethra  normal no masses, tenderness, scarring  bladder, no masses or tenderness  Cervix no lesions or discharge  Bimanual exam with   Uterus 6 weeks size, mid position, mobile, mild tenderness, no descent   Adnexa/parametria normal  Clitoris obliterated with whitening; Pink & thinned skin, slight whitening on labia majora & minora          ASSESSMENT:  Natividad Mcginnis is an 78 year old  postmenopausal woman   who presents for annual gyn exam.     PLAN:  Dx: Annual gyn physical; Lichen sclerosus  1)  Mammogram ordered; Pap smear no longer indicated due to age; Labs completed with PCP  2)  Lichen sclerosus: Continue applying alclomethasone 0.05% ointment daily  3)  Return to clinic in 1 year for annual gyn physical, otherwise as needed.      Rx:  - alclomethasone 0.05% ointment, Apply topically daily      PE:  Reviewed health maintenance including diet, regular exercise,   estrogen replacement and periodic exams.        The information in this document, created by the medical scribe for me, accurately reflects the services I personally performed and the decisions made by me. I have reviewed and approved this document for accuracy prior to leaving the patient care area.  1:22 PM, 18    Dr. Maida Corrales, DO    Obstetrics and Gynecology  [facility] [place]

## 2018-12-03 NOTE — PATIENT INSTRUCTIONS
Return yearly     Dr. Maida Corrales, DO    Obstetrics and Gynecology  [facility] [place] [facility] [place]

## 2018-12-03 NOTE — MR AVS SNAPSHOT
After Visit Summary   12/3/2018    Natividad Mcginnis    MRN: [id number]           Patient Information     Date Of Birth          1940        Visit Information        Provider Department      12/3/2018 1:00 PM Maida Corrales,  [facility]        Today's Diagnoses     Encounter for preventative adult health care examination    -  1    Encounter for screening mammogram for malignant neoplasm of breast         Lichen sclerosus          Care Instructions    Return yearly     Dr. Maida Corrales,     Obstetrics and Gynecology  [facility]                 Follow-ups after your visit        Future tests that were ordered for you today     Open Future Orders        Priority Expected Expires Ordered    *MA Screening Digital Bilateral Routine  12/3/2019 12/3/2018            Who to contact     If you have questions or need follow up information about today's clinic visit or your schedule please contact [facility] directly at [phone].  Normal or non-critical lab and imaging results will be communicated to you by MyChart, letter or phone within 4 business days after the clinic has received the results. If you do not hear from us within 7 days, please contact the clinic through [employer] or phone. If you have a critical or abnormal lab result, we will notify you by phone as soon as possible.  Submit refill requests through Citilog or call your pharmacy and they will forward the refill request to us. Please allow 3 business days for your refill to be completed.          Additional Information About Your Visit        MyChart Information     [employer] gives you secure access to your electronic health record. If you see a primary care provider, you can also send messages to your care team and make appointments. If you have questions, please call your primary care clinic.  If you do not have a primary care provider, please call [phone] and they  "will assist you.        Care EveryWhere ID     This is your Care EveryWhere ID. This could be used by other organizations to access your [place] medical records  [id number]        Your Vitals Were     Height BMI (Body Mass Index)                5' 0.5\" (1.537 m) 22.84 kg/m2           Blood Pressure from Last 3 Encounters:   12/03/18 150/90   09/24/18 152/90   09/04/18 138/80    Weight from Last 3 Encounters:   12/03/18 118 lb 14.4 oz (53.9 kg)   09/24/18 129 lb 8 oz (58.7 kg)   09/04/18 133 lb (60.3 kg)                 Today's Medication Changes          These changes are accurate as of 12/3/18  1:28 PM.  If you have any questions, ask your nurse or doctor.               These medicines have changed or have updated prescriptions.        Dose/Directions    alclomethasone 0.05 % ointment   Commonly known as:  ACLOVATE   This may have changed:  when to take this   Used for:  Lichen sclerosus   Changed by:  Maida Corrales, DO        Apply topically daily   Quantity:  60 g   Refills:  11            Where to get your medicines      These medications were sent to [id number] [place], [place] [id number]     Phone:  [phone]     alclomethasone 0.05 % ointment                Primary Care Provider Office Phone # Fax #    Ramona Ann Aaseby-Aguilera, PA-C [phone] [address]  [facility] [address]        Equal Access to Services     SO[facility] RUIZ : Hadii marii cash Soklever, waaxda luqadaha, qaybta kaalmajulian choudhary. So [facility] [phone].    ATENCIÓN: Si habla español, tiene a valentine disposición servicios gratuitos de asistencia lingüística. Llame al [phone].    We comply with applicable federal civil rights laws and [place] laws. We do not discriminate on the basis of race, color, national origin, age, disability, sex, sexual orientation, or gender identity.          "   Thank you!     Thank you for choosing [facility]  for your care. Our goal is always to provide you with excellent care. Hearing back from our patients is one way we can continue to improve our services. Please take a few minutes to complete the written survey that you may receive in the mail after your visit with us. Thank you!             Your Updated Medication List - Protect others around you: Learn how to safely use, store and throw away your medicines at www.disposemymeds.org.          This list is accurate as of 12/3/18  1:28 PM.  Always use your most recent med list.                   Brand Name Dispense Instructions for use Diagnosis    alclomethasone 0.05 % ointment    ACLOVATE    60 g    Apply topically daily    Lichen sclerosus       calcium 600/vitamin D 600-400 MG-UNIT per tablet   Generic drug:  calcium carbonate 600 mg-vitamin D 400 units     60 tablet    Take 1 tablet by mouth 2 times daily.        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        risperiDONE 0.5 MG tablet    risperDAL    90 tablet    TAKE 1 TABLET BY MOUTH DAILY AT BEDTIME.    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

## 2019-03-05 DIAGNOSIS — I10 BENIGN ESSENTIAL HYPERTENSION: ICD-10-CM

## 2019-03-05 NOTE — TELEPHONE ENCOUNTER
"Requested Prescriptions   Pending Prescriptions Disp Refills     lisinopril (PRINIVIL/ZESTRIL) 20 MG tablet [Pharmacy Med Name: Lisinopril Oral Tablet 20 MG] 90 tablet 0    Last Written Prescription Date:  09/14/2018  Last Fill Quantity: 90 tablet,  # refills: 1   Last office visit: 9/4/2018 with prescribing provider:  09/04/2018   Future Office Visit:     Sig: Take 1 tablet by mouth daily.    ACE Inhibitors (Including Combos) Protocol Failed - 3/5/2019  9:59 AM       Failed - Blood pressure under 140/90 in past 12 months    BP Readings from Last 3 Encounters:   12/03/18 150/90   09/24/18 152/90   09/04/18 138/80                Failed - Normal serum creatinine on file in past 12 months    Recent Labs   Lab Test 07/02/18  1201   CR 0.50*            Passed - Recent (12 mo) or future (30 days) visit within the authorizing provider's specialty    Patient had office visit in the last 12 months or has a visit in the next 30 days with authorizing provider or within the authorizing provider's specialty.  See \"Patient Info\" tab in inbasket, or \"Choose Columns\" in Meds & Orders section of the refill encounter.             Passed - Medication is active on med list       Passed - Patient is age 18 or older       Passed - No active pregnancy on record       Passed - Normal serum potassium on file in past 12 months    Recent Labs   Lab Test 07/02/18  1201   POTASSIUM 4.0            Passed - No positive pregnancy test within past 12 months        Timmy SANCHEZT  "

## 2019-03-05 NOTE — TELEPHONE ENCOUNTER
Routing refill request to provider for review/approval because:  Labs out of range:  Cr    BP's elevated.     Peyton Andre RN -- [facility]

## 2019-03-07 RX ORDER — LISINOPRIL 20 MG/1
TABLET ORAL
Qty: 90 TABLET | Refills: 0 | Status: SHIPPED | OUTPATIENT
Start: 2019-03-07 | End: 2019-04-16

## 2019-03-29 ENCOUNTER — TRANSFERRED RECORDS (OUTPATIENT)
Dept: HEALTH INFORMATION MANAGEMENT | Facility: CLINIC | Age: 79
End: 2019-03-29

## 2019-03-29 LAB — TSH SERPL-ACNC: 3.49 UIU/ML (ref 0.3–5)

## 2019-04-02 ENCOUNTER — OFFICE VISIT (OUTPATIENT)
Dept: FAMILY MEDICINE | Facility: CLINIC | Age: 79
End: 2019-04-02
Payer: MEDICARE

## 2019-04-02 VITALS
RESPIRATION RATE: 16 BRPM | SYSTOLIC BLOOD PRESSURE: 160 MMHG | WEIGHT: 142.4 LBS | DIASTOLIC BLOOD PRESSURE: 70 MMHG | HEIGHT: 65 IN | OXYGEN SATURATION: 97 % | TEMPERATURE: 98.4 F | HEART RATE: 79 BPM | BODY MASS INDEX: 23.72 KG/M2

## 2019-04-02 DIAGNOSIS — I10 ESSENTIAL HYPERTENSION: Primary | ICD-10-CM

## 2019-04-02 PROCEDURE — 99214 OFFICE O/P EST MOD 30 MIN: CPT | Performed by: PHYSICIAN ASSISTANT

## 2019-04-02 RX ORDER — LEVOTHYROXINE SODIUM 75 UG/1
TABLET ORAL
Refills: 3 | COMMUNITY
Start: 2019-03-30

## 2019-04-02 RX ORDER — AMLODIPINE BESYLATE 5 MG/1
5 TABLET ORAL DAILY
Qty: 30 TABLET | Refills: 1 | Status: SHIPPED | OUTPATIENT
Start: 2019-04-02 | End: 2019-04-16

## 2019-04-02 ASSESSMENT — MIFFLIN-ST. JEOR: SCORE: 1136.8

## 2019-04-02 NOTE — PROGRESS NOTES
SUBJECTIVE:   Natividad Mcginnis is a 78 year old female who presents to clinic today for the following health issues:      Hypertension Follow-up      Outpatient blood pressures are not being checked.    Low Salt Diet: no added salt      Amount of exercise or physical activity: None    Problems taking medications regularly: No    Medication side effects: none    Diet: regular (no restrictions)    Blood pressure has been high lately         Problem list and histories reviewed & adjusted, as indicated.  Additional history: as documented    Current Outpatient Medications   Medication Sig Dispense Refill     alclomethasone (ACLOVATE) 0.05 % ointment Apply topically daily 60 g 11     amLODIPine (NORVASC) 5 MG tablet Take 1 tablet (5 mg) by mouth daily 30 tablet 1     calcium-vitamin D (CALCIUM 600/VITAMIN D) 600-400 MG-UNIT per tablet Take 1 tablet by mouth 2 times daily. 60 tablet      fish oil-omega-3 fatty acids 1000 MG capsule Take 2 g by mouth daily       gabapentin (NEURONTIN) 300 MG capsule Take 1 capsule (300 mg) by mouth 3 times daily 270 capsule 3     levothyroxine (SYNTHROID/LEVOTHROID) 75 MCG tablet   3     lisinopril (PRINIVIL/ZESTRIL) 20 MG tablet Take 1 tablet by mouth daily. 90 tablet 0     multivitamin, therapeutic with minerals (MULTI-VITAMIN) TABS Take 1 tablet by mouth daily. 100 tablet 3     risperiDONE (RISPERDAL) 0.5 MG tablet TAKE 1 TABLET BY MOUTH DAILY AT BEDTIME. 90 tablet 3     sertraline (ZOLOFT) 100 MG tablet Take 1 tablet (100 mg) by mouth daily 90 tablet 1     BP Readings from Last 3 Encounters:   04/02/19 160/70   12/03/18 150/90   09/24/18 152/90    Wt Readings from Last 3 Encounters:   04/02/19 64.6 kg (142 lb 6.4 oz)   12/03/18 53.9 kg (118 lb 14.4 oz)   09/24/18 58.7 kg (129 lb 8 oz)                    Reviewed and updated as needed this visit by clinical staff  Allergies       Reviewed and updated as needed this visit by Provider         ROS:  Constitutional, HEENT, cardiovascular,  "pulmonary, gi and gu systems are negative, except as otherwise noted.    OBJECTIVE:                                                    /70 (BP Location: Right arm, Patient Position: Chair, Cuff Size: Adult Regular)   Pulse 79   Temp 98.4  F (36.9  C) (Oral)   Resp 16   Ht 1.651 m (5' 5\")   Wt 64.6 kg (142 lb 6.4 oz)   SpO2 97%   Breastfeeding? No   BMI 23.70 kg/m    Body mass index is 23.7 kg/m .  GENERAL APPEARANCE: healthy, alert and no distress  HENT: ear canals and TM's normal and nose and mouth without ulcers or lesions  RESP: lungs clear to auscultation - no rales, rhonchi or wheezes  CV: regular rates and rhythm, normal S1 S2, no S3 or S4 and no murmur, click or rub  MS: extremities normal- no gross deformities noted    Diagnostic test results:  Diagnostic Test Results:  none      ASSESSMENT/PLAN:                                                    1. Essential hypertension    - Comprehensive metabolic panel  - TSH with free T4 reflex  - CBC with platelets  - amLODIPine (NORVASC) 5 MG tablet; Take 1 tablet (5 mg) by mouth daily  Dispense: 30 tablet; Refill: 1      See Patient Instructions    Ramona Ann Aaseby-Aguilera, PA-C  [facility]  Patient Instructions   (I10) Essential hypertension  (primary encounter diagnosis)  Comment: bp high.  Well add amlodipine 5 mg to lisinopril 20 mg.   Will have come in for nurse only BP check x 2 and then follow-up for office visit in 1 month with BP diary.      Plan: Comprehensive metabolic panel, TSH with free T4        reflex, CBC with platelets, amLODIPine         (NORVASC) 5 MG tablet                "

## 2019-04-09 ENCOUNTER — ALLIED HEALTH/NURSE VISIT (OUTPATIENT)
Dept: NURSING | Facility: CLINIC | Age: 79
End: 2019-04-09
Payer: MEDICARE

## 2019-04-09 VITALS — HEART RATE: 71 BPM | DIASTOLIC BLOOD PRESSURE: 90 MMHG | SYSTOLIC BLOOD PRESSURE: 180 MMHG

## 2019-04-09 DIAGNOSIS — Z01.30 BP CHECK: Primary | ICD-10-CM

## 2019-04-09 NOTE — NURSING NOTE
Natividad Mcginnis is a 78 year old patient who comes in today for a Blood Pressure check.  Initial BP:  /90 (BP Location: Right arm, Patient Position: Chair, Cuff Size: Adult Regular)   Pulse 71      71  Disposition: provider notified while patient in the clinic and results routed to provider      Discussed BP with Staci and pt will be coming in on the 16th for a follow up. She was put on a new medication last week. Staci wanted to give it a little more time before she changes any medication. Pt was advised and told to go to ER if she started having any symptoms.  MARITZA Suggs        BP Readings from Last 6 Encounters:   04/09/19 180/90   04/02/19 160/70   12/03/18 150/90   09/24/18 152/90   09/04/18 138/80   08/27/18 136/78     MARITZA Suggs

## 2019-04-16 ENCOUNTER — OFFICE VISIT (OUTPATIENT)
Dept: FAMILY MEDICINE | Facility: CLINIC | Age: 79
End: 2019-04-16
Payer: MEDICARE

## 2019-04-16 VITALS
HEIGHT: 65 IN | OXYGEN SATURATION: 99 % | BODY MASS INDEX: 24.18 KG/M2 | HEART RATE: 83 BPM | TEMPERATURE: 98.4 F | SYSTOLIC BLOOD PRESSURE: 140 MMHG | WEIGHT: 145.1 LBS | DIASTOLIC BLOOD PRESSURE: 80 MMHG | RESPIRATION RATE: 16 BRPM

## 2019-04-16 DIAGNOSIS — I10 BENIGN ESSENTIAL HYPERTENSION: ICD-10-CM

## 2019-04-16 DIAGNOSIS — F33.3 SEVERE RECURRENT MAJOR DEPRESSIVE DISORDER WITH PSYCHOTIC FEATURES (H): ICD-10-CM

## 2019-04-16 LAB
ERYTHROCYTE [DISTWIDTH] IN BLOOD BY AUTOMATED COUNT: 13.6 % (ref 10–15)
HCT VFR BLD AUTO: 40.9 % (ref 35–47)
HGB BLD-MCNC: 13.2 G/DL (ref 11.7–15.7)
MCH RBC QN AUTO: 29.5 PG (ref 26.5–33)
MCHC RBC AUTO-ENTMCNC: 32.3 G/DL (ref 31.5–36.5)
MCV RBC AUTO: 91 FL (ref 78–100)
PLATELET # BLD AUTO: 270 10E9/L (ref 150–450)
RBC # BLD AUTO: 4.48 10E12/L (ref 3.8–5.2)
WBC # BLD AUTO: 6.2 10E9/L (ref 4–11)

## 2019-04-16 PROCEDURE — 85027 COMPLETE CBC AUTOMATED: CPT | Performed by: PHYSICIAN ASSISTANT

## 2019-04-16 PROCEDURE — 36415 COLL VENOUS BLD VENIPUNCTURE: CPT | Performed by: PHYSICIAN ASSISTANT

## 2019-04-16 PROCEDURE — 80053 COMPREHEN METABOLIC PANEL: CPT | Performed by: PHYSICIAN ASSISTANT

## 2019-04-16 PROCEDURE — 99214 OFFICE O/P EST MOD 30 MIN: CPT | Performed by: PHYSICIAN ASSISTANT

## 2019-04-16 PROCEDURE — 84443 ASSAY THYROID STIM HORMONE: CPT | Performed by: PHYSICIAN ASSISTANT

## 2019-04-16 RX ORDER — AMLODIPINE BESYLATE 5 MG/1
5 TABLET ORAL DAILY
Qty: 90 TABLET | Refills: 1 | Status: SHIPPED | OUTPATIENT
Start: 2019-04-16 | End: 2019-10-28

## 2019-04-16 RX ORDER — GABAPENTIN 300 MG/1
300 CAPSULE ORAL 3 TIMES DAILY
Qty: 270 CAPSULE | Refills: 3 | Status: SHIPPED | OUTPATIENT
Start: 2019-04-16 | End: 2020-05-13

## 2019-04-16 RX ORDER — LISINOPRIL 20 MG/1
20 TABLET ORAL DAILY
Qty: 90 TABLET | Refills: 0 | Status: SHIPPED | OUTPATIENT
Start: 2019-04-16 | End: 2019-09-16

## 2019-04-16 ASSESSMENT — MIFFLIN-ST. JEOR: SCORE: 1149.05

## 2019-04-16 NOTE — PATIENT INSTRUCTIONS
(I10) Essential hypertension  Comment:   Plan: amLODIPine (NORVASC) 5 MG tablet        Stable and doing well with the addition of amlodipine 5 mg.  Advised follow-up in 6 months

## 2019-04-16 NOTE — PROGRESS NOTES
SUBJECTIVE:   Natividad Mcginnis is a 78 year old female who presents to clinic today for the following   health issues:    Daina has had elevated blood pressures at last visit amlodipine 5 mg was added.  She states her blood pressures have been below 140 below 80 since then  Hypertension Follow-up      Outpatient blood pressures are being checked at dr office.  Results are elevated.    Low Salt Diet: tries to monitor salt       Amount of exercise or physical activity: None    Problems taking medications regularly: No    Medication side effects: none    Diet: regular (no restrictions)            Additional history: as documented    Reviewed  and updated as needed this visit by clinical staff  Allergies  Meds         Reviewed and updated as needed this visit by Provider         Current Outpatient Medications   Medication Sig Dispense Refill     alclomethasone (ACLOVATE) 0.05 % ointment Apply topically daily 60 g 11     amLODIPine (NORVASC) 5 MG tablet Take 1 tablet (5 mg) by mouth daily 90 tablet 1     calcium-vitamin D (CALCIUM 600/VITAMIN D) 600-400 MG-UNIT per tablet Take 1 tablet by mouth 2 times daily. 60 tablet      fish oil-omega-3 fatty acids 1000 MG capsule Take 2 g by mouth daily       gabapentin (NEURONTIN) 300 MG capsule Take 1 capsule (300 mg) by mouth 3 times daily 270 capsule 3     levothyroxine (SYNTHROID/LEVOTHROID) 75 MCG tablet   3     lisinopril (PRINIVIL/ZESTRIL) 20 MG tablet Take 1 tablet (20 mg) by mouth daily 90 tablet 0     multivitamin, therapeutic with minerals (MULTI-VITAMIN) TABS Take 1 tablet by mouth daily. 100 tablet 3     risperiDONE (RISPERDAL) 0.5 MG tablet TAKE 1 TABLET BY MOUTH DAILY AT BEDTIME. 90 tablet 3     sertraline (ZOLOFT) 100 MG tablet Take 1 tablet (100 mg) by mouth daily 90 tablet 1     Recent Labs   Lab Test 03/29/19 07/02/18  1201  08/07/15  0804  08/05/15  0055 07/31/15  1146  01/15/15  0859 06/06/13  1114   LDL  --  111*  --   --   --   --   --   --  105 67   HDL   "--  81  --   --   --   --   --   --  91 88   TRIG  --  174*  --   --   --   --   --   --  143 112   ALT  --  25  --   --   --  16 13  --   --   --    CR  --  0.50*  --  0.64  --  0.71 0.78   < >  --   --    GFRESTIMATED  --  >90  --  >90  Non  GFR Calc    --  81 73   < >  --   --    GFRESTBLACK  --  >90  --  >90  African American GFR Calc    --  >90   GFR Calc   88   < >  --   --    POTASSIUM  --  4.0  --  3.6  --  3.4 3.0*   < >  --   --    TSH 3.49 2.36   < > 8.58*   < >  --  1.32  --   --  0.98    < > = values in this interval not displayed.      BP Readings from Last 3 Encounters:   04/16/19 140/80   04/09/19 180/90   04/02/19 160/70    Wt Readings from Last 3 Encounters:   04/16/19 65.8 kg (145 lb 1.6 oz)   04/02/19 64.6 kg (142 lb 6.4 oz)   12/03/18 53.9 kg (118 lb 14.4 oz)                    ROS:  Constitutional, HEENT, cardiovascular, pulmonary, gi and gu systems are negative, except as otherwise noted.    OBJECTIVE:                                                    /80 (BP Location: Right arm, Patient Position: Chair, Cuff Size: Adult Regular)   Pulse 83   Temp 98.4  F (36.9  C) (Oral)   Resp 16   Ht 1.651 m (5' 5\")   Wt 65.8 kg (145 lb 1.6 oz)   SpO2 99%   Breastfeeding? No   BMI 24.15 kg/m    Body mass index is 24.15 kg/m .  GENERAL APPEARANCE: healthy, alert and no distress  HENT: ear canals and TM's normal and nose and mouth without ulcers or lesions  RESP: lungs clear to auscultation - no rales, rhonchi or wheezes  CV: regular rates and rhythm, normal S1 S2, no S3 or S4 and no murmur, click or rub  LYMPHATICS: no cervical adenopathy  MS: extremities normal- no gross deformities noted    Diagnostic test results:  Diagnostic Test Results:  none      ASSESSMENT/PLAN:                                                    1. Essential hypertension    - amLODIPine (NORVASC) 5 MG tablet; Take 1 tablet (5 mg) by mouth daily  Dispense: 90 tablet; Refill: 1  - CBC with " platelets  - Comprehensive metabolic panel (BMP + Alb, Alk Phos, ALT, AST, Total. Bili, TP)  - TSH with free T4 reflex    2. Benign essential hypertension    - lisinopril (PRINIVIL/ZESTRIL) 20 MG tablet; Take 1 tablet (20 mg) by mouth daily  Dispense: 90 tablet; Refill: 0    3. 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  Advised follow-up in 6 months           Ramona Ann Aaseby-Aguilera, PA-C  [facility]

## 2019-04-17 LAB
ALBUMIN SERPL-MCNC: 4.3 G/DL (ref 3.4–5)
ALP SERPL-CCNC: 91 U/L (ref 40–150)
ALT SERPL W P-5'-P-CCNC: 24 U/L (ref 0–50)
ANION GAP SERPL CALCULATED.3IONS-SCNC: 7 MMOL/L (ref 3–14)
AST SERPL W P-5'-P-CCNC: 21 U/L (ref 0–45)
BILIRUB SERPL-MCNC: 0.4 MG/DL (ref 0.2–1.3)
BUN SERPL-MCNC: 18 MG/DL (ref 7–30)
CALCIUM SERPL-MCNC: 9.6 MG/DL (ref 8.5–10.1)
CHLORIDE SERPL-SCNC: 104 MMOL/L (ref 94–109)
CO2 SERPL-SCNC: 25 MMOL/L (ref 20–32)
CREAT SERPL-MCNC: 0.57 MG/DL (ref 0.52–1.04)
GFR SERPL CREATININE-BSD FRML MDRD: 90 ML/MIN/{1.73_M2}
GLUCOSE SERPL-MCNC: 84 MG/DL (ref 70–99)
POTASSIUM SERPL-SCNC: 4.5 MMOL/L (ref 3.4–5.3)
PROT SERPL-MCNC: 8.1 G/DL (ref 6.8–8.8)
SODIUM SERPL-SCNC: 136 MMOL/L (ref 133–144)
TSH SERPL DL<=0.005 MIU/L-ACNC: 2.64 MU/L (ref 0.4–4)

## 2019-07-29 ENCOUNTER — ANCILLARY PROCEDURE (OUTPATIENT)
Dept: MAMMOGRAPHY | Facility: CLINIC | Age: 79
End: 2019-07-29
Payer: MEDICARE

## 2019-07-29 DIAGNOSIS — Z12.31 ENCOUNTER FOR SCREENING MAMMOGRAM FOR MALIGNANT NEOPLASM OF BREAST: ICD-10-CM

## 2019-07-29 DIAGNOSIS — Z00.00 ENCOUNTER FOR PREVENTATIVE ADULT HEALTH CARE EXAMINATION: ICD-10-CM

## 2019-07-29 PROCEDURE — 77067 SCR MAMMO BI INCL CAD: CPT | Mod: TC

## 2019-08-21 DIAGNOSIS — F33.3 SEVERE RECURRENT MAJOR DEPRESSIVE DISORDER WITH PSYCHOTIC FEATURES (H): ICD-10-CM

## 2019-08-21 NOTE — TELEPHONE ENCOUNTER
Routing refill request to provider for review/approval because:  Labs not current:  Cholesterol and CBC  Pt not due until October for med check  Mariah Lamas RN, BSN    Last Written Prescription Date:  7/2/18  Last Fill Quantity: 90,  # refills: 3   Last office visit: 4/16/2019 with prescribing provider:  Staci Faith Office Visit:    Requested Prescriptions   Pending Prescriptions Disp Refills     risperiDONE (RISPERDAL) 0.5 MG tablet [Pharmacy Med Name: risperiDONE Oral Tablet 0.5 MG] 90 tablet 2     Sig: TAKE 1 TABLET BY MOUTH DAILY AT BEDTIME.       Antipsychotic Medications Failed - 8/21/2019 10:52 AM        Failed - Blood pressure under 140/90 in past 12 months     BP Readings from Last 3 Encounters:   04/16/19 140/80   04/09/19 180/90   04/02/19 160/70                 Failed - Lipid panel on file within the past 12 months     Recent Labs   Lab Test 07/02/18  1201 01/15/15  0859   CHOL 227* 225*   TRIG 174* 143   HDL 81 91   * 105   NHDL 146*  --    VLDL  --  29   CHOLHDLRATIO  --  2.5               Passed - Patient is 12 years of age or older        Passed - CBC on file in past 12 months     Recent Labs   Lab Test 04/16/19  1132   WBC 6.2   RBC 4.48   HGB 13.2   HCT 40.9                    Passed - Heart Rate on file within past 12 months     Pulse Readings from Last 3 Encounters:   04/16/19 83   04/09/19 71   04/02/19 79               Passed - A1c or Glucose on file in past 12 months     Recent Labs   Lab Test 04/16/19  1132   GLC 84       Please review patients last 3 weights. If a weight gain of >10 lbs exists, you may refill the prescription once after instructing the patient to schedule an appointment within the next 30 days.    Wt Readings from Last 3 Encounters:   04/16/19 65.8 kg (145 lb 1.6 oz)   04/02/19 64.6 kg (142 lb 6.4 oz)   12/03/18 53.9 kg (118 lb 14.4 oz)             Passed - Medication is active on med list        Passed - Patient is not pregnant        Passed - No positve  "pregnancy test on file in past 12 months        Passed - Recent (6 mo) or future (30 days) visit within the authorizing provider's specialty     Patient had office visit in the last 6 months or has a visit in the next 30 days with authorizing provider or within the authorizing provider's specialty.  See \"Patient Info\" tab in inbasket, or \"Choose Columns\" in Meds & Orders section of the refill encounter.              "

## 2019-08-22 RX ORDER — RISPERIDONE 0.5 MG/1
TABLET ORAL
Qty: 90 TABLET | Refills: 2 | Status: SHIPPED | OUTPATIENT
Start: 2019-08-22 | End: 2020-06-02

## 2019-09-16 DIAGNOSIS — I10 BENIGN ESSENTIAL HYPERTENSION: ICD-10-CM

## 2019-09-16 RX ORDER — LISINOPRIL 20 MG/1
TABLET ORAL
Qty: 90 TABLET | Refills: 0 | Status: SHIPPED | OUTPATIENT
Start: 2019-09-16 | End: 2019-12-09

## 2019-09-16 NOTE — TELEPHONE ENCOUNTER
"Prescription approved per [facility] [place].  Mariah Lamas RN, BSN      Last Written Prescription Date:  4/16/19  Last Fill Quantity: 90,  # refills: 0   Last office visit: 4/16/2019 with prescribing provider:  Sat[facility]   Future Office Visit:    Requested Prescriptions   Pending Prescriptions Disp Refills     lisinopril (PRINIVIL/ZESTRIL) 20 MG tablet [Pharmacy Med Name: Lisinopril Oral Tablet 20 MG] 90 tablet 0     Sig: Take 1 tablet by mouth daily.       ACE Inhibitors (Including Combos) Protocol Failed - 9/16/2019  9:55 AM        Failed - Blood pressure under 140/90 in past 12 months     BP Readings from Last 3 Encounters:   04/16/19 140/80   04/09/19 180/90   04/02/19 160/70                 Passed - Recent (12 mo) or future (30 days) visit within the authorizing provider's specialty     Patient had office visit in the last 12 months or has a visit in the next 30 days with authorizing provider or within the authorizing provider's specialty.  See \"Patient Info\" tab in inbasket, or \"Choose Columns\" in Meds & Orders section of the refill encounter.              Passed - Medication is active on med list        Passed - Patient is age 18 or older        Passed - No active pregnancy on record        Passed - Normal serum creatinine on file in past 12 months     Recent Labs   Lab Test 04/16/19  1132   CR 0.57             Passed - Normal serum potassium on file in past 12 months     Recent Labs   Lab Test 04/16/19  1132   POTASSIUM 4.5             Passed - No positive pregnancy test within past 12 months            "

## 2019-10-28 DIAGNOSIS — I10 BENIGN ESSENTIAL HYPERTENSION: ICD-10-CM

## 2019-10-28 NOTE — TELEPHONE ENCOUNTER
"Requested Prescriptions   Pending Prescriptions Disp Refills     amLODIPine (NORVASC) 5 MG tablet [Pharmacy Med Name: amLODIPine Besylate Oral Tablet 5 MG] 90 tablet 0     Sig: Take 1 tablet by mouth daily.     Last Written Prescription Date:  04/16/2019  Last Fill Quantity: 90 tablet,  # refills: 1   Last office visit: 4/16/2019 with prescribing provider:  04/16/2019   Future Office Visit:          Calcium Channel Blockers Protocol  Failed - 10/28/2019  9:23 AM        Failed - Blood pressure under 140/90 in past 12 months     BP Readings from Last 3 Encounters:   04/16/19 140/80   04/09/19 180/90   04/02/19 160/70                 Passed - Recent (12 mo) or future (30 days) visit within the authorizing provider's specialty     Patient has had an office visit with the authorizing provider or a provider within the authorizing providers department within the previous 12 mos or has a future within next 30 days. See \"Patient Info\" tab in inbasket, or \"Choose Columns\" in Meds & Orders section of the refill encounter.              Passed - Medication is active on med list        Passed - Patient is age 18 or older        Passed - No active pregnancy on record        Passed - Normal serum creatinine on file in past 12 months     Recent Labs   Lab Test 04/16/19  1132   CR 0.57             Passed - No positive pregnancy test in past 12 months          Timmy DAMON  "

## 2019-10-29 RX ORDER — AMLODIPINE BESYLATE 5 MG/1
TABLET ORAL
Qty: 30 TABLET | Refills: 0 | Status: SHIPPED | OUTPATIENT
Start: 2019-10-29 | End: 2019-11-14 | Stop reason: DRUGHIGH

## 2019-10-29 NOTE — TELEPHONE ENCOUNTER
Prescription approved per [facility] [place] [place] Refill Protocol.  For 30 days-  LM pt needs OV     Lily Rosado RN

## 2019-11-14 ENCOUNTER — OFFICE VISIT (OUTPATIENT)
Dept: FAMILY MEDICINE | Facility: CLINIC | Age: 79
End: 2019-11-14
Payer: MEDICARE

## 2019-11-14 VITALS
BODY MASS INDEX: 24.69 KG/M2 | WEIGHT: 148.2 LBS | OXYGEN SATURATION: 98 % | HEART RATE: 75 BPM | TEMPERATURE: 98 F | SYSTOLIC BLOOD PRESSURE: 175 MMHG | RESPIRATION RATE: 16 BRPM | HEIGHT: 65 IN | DIASTOLIC BLOOD PRESSURE: 75 MMHG

## 2019-11-14 DIAGNOSIS — I10 BENIGN ESSENTIAL HYPERTENSION: Primary | ICD-10-CM

## 2019-11-14 DIAGNOSIS — F41.9 ANXIETY: ICD-10-CM

## 2019-11-14 PROCEDURE — 96127 BRIEF EMOTIONAL/BEHAV ASSMT: CPT | Performed by: PHYSICIAN ASSISTANT

## 2019-11-14 PROCEDURE — 99214 OFFICE O/P EST MOD 30 MIN: CPT | Performed by: PHYSICIAN ASSISTANT

## 2019-11-14 RX ORDER — AMLODIPINE BESYLATE 10 MG/1
10 TABLET ORAL DAILY
Qty: 30 TABLET | Refills: 1 | Status: SHIPPED | OUTPATIENT
Start: 2019-11-14 | End: 2019-12-09

## 2019-11-14 ASSESSMENT — ANXIETY QUESTIONNAIRES
2. NOT BEING ABLE TO STOP OR CONTROL WORRYING: NOT AT ALL
6. BECOMING EASILY ANNOYED OR IRRITABLE: NOT AT ALL
5. BEING SO RESTLESS THAT IT IS HARD TO SIT STILL: SEVERAL DAYS
IF YOU CHECKED OFF ANY PROBLEMS ON THIS QUESTIONNAIRE, HOW DIFFICULT HAVE THESE PROBLEMS MADE IT FOR YOU TO DO YOUR WORK, TAKE CARE OF THINGS AT HOME, OR GET ALONG WITH OTHER PEOPLE: NOT DIFFICULT AT ALL
GAD7 TOTAL SCORE: 1
7. FEELING AFRAID AS IF SOMETHING AWFUL MIGHT HAPPEN: NOT AT ALL
3. WORRYING TOO MUCH ABOUT DIFFERENT THINGS: NOT AT ALL
1. FEELING NERVOUS, ANXIOUS, OR ON EDGE: NOT AT ALL

## 2019-11-14 ASSESSMENT — MIFFLIN-ST. JEOR: SCORE: 1158.11

## 2019-11-14 ASSESSMENT — PATIENT HEALTH QUESTIONNAIRE - PHQ9
SUM OF ALL RESPONSES TO PHQ QUESTIONS 1-9: 1
5. POOR APPETITE OR OVEREATING: NOT AT ALL

## 2019-11-14 NOTE — PROGRESS NOTES
Subjective     Natividad Mcginnis is a 79 year old female who presents to clinic today for the following health issues:      Blood pressure appears significantly elevated today.  Has been taking lisinopril 20 mg daily and amlodipine 5 mg  HPI   Hypertension Follow-up      Do you check your blood pressure regularly outside of the clinic? No     Are you following a low salt diet? Yes    Are your blood pressures ever more than 140 on the top number (systolic) OR more   than 90 on the bottom number (diastolic), for example 140/90? No      How many servings of fruits and vegetables do you eat daily?  2-3    On average, how many sweetened beverages do you drink each day (soda, juice, sweet tea, etc)?   0  How many days per week do you miss taking your medication? 2    What makes it hard for you to take your medications?  remembering to take        Current Outpatient Medications   Medication Sig Dispense Refill     alclomethasone (ACLOVATE) 0.05 % ointment Apply topically daily 60 g 11     amLODIPine (NORVASC) 10 MG tablet Take 1 tablet (10 mg) by mouth daily 30 tablet 1     calcium-vitamin D (CALCIUM 600/VITAMIN D) 600-400 MG-UNIT per tablet Take 1 tablet by mouth 2 times daily. 60 tablet      gabapentin (NEURONTIN) 300 MG capsule Take 1 capsule (300 mg) by mouth 3 times daily 270 capsule 3     levothyroxine (SYNTHROID/LEVOTHROID) 75 MCG tablet   3     lisinopril (PRINIVIL/ZESTRIL) 20 MG tablet Take 1 tablet by mouth daily. 90 tablet 0     multivitamin, therapeutic with minerals (MULTI-VITAMIN) TABS Take 1 tablet by mouth daily. 100 tablet 3     risperiDONE (RISPERDAL) 0.5 MG tablet TAKE 1 TABLET BY MOUTH DAILY AT BEDTIME. 90 tablet 2     sertraline (ZOLOFT) 100 MG tablet Take 1 tablet by mouth daily. 90 tablet 3     Recent Labs   Lab Test 04/16/19  1132 03/29/19 07/02/18  1201  08/05/15  0055  01/15/15  0859 06/06/13  1114   LDL  --   --  111*  --   --   --  105 67   HDL  --   --  81  --   --   --  91 88   TRIG  --   --   "174*  --   --   --  143 112   ALT 24  --  25  --  16   < >  --   --    CR 0.57  --  0.50*   < > 0.71   < >  --   --    GFRESTIMATED 90  --  >90   < > 81   < >  --   --    GFRESTBLACK >90  --  >90   < > >90  African American GFR Calc     < >  --   --    POTASSIUM 4.5  --  4.0   < > 3.4   < >  --   --    TSH 2.64 3.49 2.36   < >  --    < >  --  0.98    < > = values in this interval not displayed.      BP Readings from Last 3 Encounters:   11/14/19 (!) 175/75   04/16/19 140/80   04/09/19 180/90    Wt Readings from Last 3 Encounters:   11/14/19 67.2 kg (148 lb 3.2 oz)   04/16/19 65.8 kg (145 lb 1.6 oz)   04/02/19 64.6 kg (142 lb 6.4 oz)                      Reviewed and updated as needed this visit by Provider         Review of Systems   ROS COMP: Constitutional, HEENT, cardiovascular, pulmonary, gi and gu systems are negative, except as otherwise noted.      Objective    BP (!) 175/75 (BP Location: Right arm, Patient Position: Chair, Cuff Size: Adult Regular)   Pulse 75   Temp 98  F (36.7  C) (Oral)   Resp 16   Ht 1.651 m (5' 5\")   Wt 67.2 kg (148 lb 3.2 oz)   SpO2 98%   Breastfeeding No   BMI 24.66 kg/m    Body mass index is 24.66 kg/m .  Physical Exam   GENERAL: healthy, alert and no distress  HENT: ear canals and TM's normal, nose and mouth without ulcers or lesions  RESP: lungs clear to auscultation - no rales, rhonchi or wheezes  CV: regular rate and rhythm, normal S1 S2, no S3 or S4, no murmur, click or rub, no peripheral edema and peripheral pulses strong  MS: no gross musculoskeletal defects noted, no edema  PSYCH: mentation appears normal, affect normal/bright    Diagnostic Test Results:  Labs reviewed in Epic        Assessment & Plan     1. Benign essential hypertension    - amLODIPine (NORVASC) 10 MG tablet; Take 1 tablet (10 mg) by mouth daily  Dispense: 30 tablet; Refill: 1    2. Anxiety    - EMOTIONAL / BEHAVIORAL ASSESSMENT       Patient Instructions   (I10) Benign essential hypertension  " (primary encounter diagnosis)  Comment: BP elevated today.  Well increase amlodipine to 10 mg .  Will have come in for nurse only BP check x 2 and then follow-up for office visit in 1 month with BP diary.      Plan: amLODIPine (NORVASC) 10 MG tablet            (F41.9) Anxiety  Comment: MADISON = 1  Plan: EMOTIONAL / BEHAVIORAL ASSESSMENT                  Return in about 4 weeks (around 12/12/2019).    Ramona Ann Aaseby-Aguilera, PA-C  [facility]

## 2019-11-14 NOTE — PATIENT INSTRUCTIONS
(I10) Benign essential hypertension  (primary encounter diagnosis)  Comment: BP elevated today.  Well increase amlodipine to 10 mg .  Will have come in for nurse only BP check x 2 and then follow-up for office visit in 1 month with BP diary.      Plan: amLODIPine (NORVASC) 10 MG tablet            (F41.9) Anxiety  Comment: MADISON = 1  Plan: EMOTIONAL / BEHAVIORAL ASSESSMENT

## 2019-11-15 ASSESSMENT — ANXIETY QUESTIONNAIRES: GAD7 TOTAL SCORE: 1

## 2019-11-22 ENCOUNTER — ALLIED HEALTH/NURSE VISIT (OUTPATIENT)
Dept: NURSING | Facility: CLINIC | Age: 79
End: 2019-11-22
Payer: MEDICARE

## 2019-11-22 VITALS — DIASTOLIC BLOOD PRESSURE: 58 MMHG | SYSTOLIC BLOOD PRESSURE: 160 MMHG

## 2019-11-22 DIAGNOSIS — I10 ESSENTIAL HYPERTENSION: Primary | ICD-10-CM

## 2019-11-22 NOTE — NURSING NOTE
Consulted with PCP regarding BP plan, pt will be back beginning of December for another recheck. PCP agreed to plan and pt was fine to leave  Zaria Sifuentes, JENN

## 2019-12-06 ENCOUNTER — ALLIED HEALTH/NURSE VISIT (OUTPATIENT)
Dept: NURSING | Facility: CLINIC | Age: 79
End: 2019-12-06
Payer: MEDICARE

## 2019-12-06 ENCOUNTER — TELEPHONE (OUTPATIENT)
Dept: FAMILY MEDICINE | Facility: CLINIC | Age: 79
End: 2019-12-06

## 2019-12-06 VITALS — DIASTOLIC BLOOD PRESSURE: 70 MMHG | SYSTOLIC BLOOD PRESSURE: 138 MMHG | HEART RATE: 80 BPM

## 2019-12-06 DIAGNOSIS — I10 BENIGN ESSENTIAL HYPERTENSION: ICD-10-CM

## 2019-12-06 DIAGNOSIS — I10 HTN (HYPERTENSION): Primary | ICD-10-CM

## 2019-12-06 NOTE — PROGRESS NOTES
Natividad Mcginnis is a 79 year old year old patient who comes in today for a Blood Pressure check because of ongoing blood pressure monitoring.  Vital Signs as repeated by /70  Patient is taking medication as prescribed  Patient is tolerating medications well.  Patient is not monitoring Blood Pressure at home.  Average readings if yes are   Current complaints: none  Disposition:  patient to continue with the same medication    Mariah Lamas RN, BSN

## 2019-12-06 NOTE — TELEPHONE ENCOUNTER
Pt in for BP check    BP Readings from Last 3 Encounters:   12/06/19 138/70   11/22/19 (!) 160/58   11/14/19 (!) 175/75     Please advise on follow up    Mariah Lamas RN, BSN

## 2019-12-09 RX ORDER — LISINOPRIL 20 MG/1
20 TABLET ORAL DAILY
Qty: 90 TABLET | Refills: 1 | Status: SHIPPED | OUTPATIENT
Start: 2019-12-09 | End: 2020-05-13

## 2019-12-09 RX ORDER — AMLODIPINE BESYLATE 10 MG/1
10 TABLET ORAL DAILY
Qty: 90 TABLET | Refills: 1 | Status: SHIPPED | OUTPATIENT
Start: 2019-12-09 | End: 2020-05-13

## 2020-01-27 ENCOUNTER — OFFICE VISIT (OUTPATIENT)
Dept: OBGYN | Facility: CLINIC | Age: 80
End: 2020-01-27
Payer: MEDICARE

## 2020-01-27 VITALS
HEIGHT: 65 IN | SYSTOLIC BLOOD PRESSURE: 144 MMHG | DIASTOLIC BLOOD PRESSURE: 68 MMHG | BODY MASS INDEX: 23.66 KG/M2 | WEIGHT: 142 LBS

## 2020-01-27 DIAGNOSIS — L90.0 LICHEN SCLEROSUS: ICD-10-CM

## 2020-01-27 DIAGNOSIS — Z12.31 ENCOUNTER FOR SCREENING MAMMOGRAM FOR MALIGNANT NEOPLASM OF BREAST: ICD-10-CM

## 2020-01-27 DIAGNOSIS — Z00.00 ENCOUNTER FOR PREVENTATIVE ADULT HEALTH CARE EXAMINATION: Primary | ICD-10-CM

## 2020-01-27 PROCEDURE — 99397 PER PM REEVAL EST PAT 65+ YR: CPT | Performed by: FAMILY MEDICINE

## 2020-01-27 RX ORDER — ALCLOMETASONE DIPROPIONATE 0.5 MG/G
OINTMENT TOPICAL DAILY
Qty: 60 G | Refills: 11 | Status: SHIPPED | OUTPATIENT
Start: 2020-01-27 | End: 2021-02-19

## 2020-01-27 ASSESSMENT — MIFFLIN-ST. JEOR: SCORE: 1129.99

## 2020-01-27 NOTE — NURSING NOTE
"Chief Complaint   Patient presents with     RECHECK     lichen sclerosis check up       Initial BP (!) 144/68   Ht 1.651 m (5' 5\")   Wt 64.4 kg (142 lb)   BMI 23.63 kg/m   Estimated body mass index is 23.63 kg/m  as calculated from the following:    Height as of this encounter: 1.651 m (5' 5\").    Weight as of this encounter: 64.4 kg (142 lb).  BP completed using cuff size: regular    Questioned patient about current smoking habits.  Pt. quit smoking some time ago.          The following HM Due: NONE             "

## 2020-01-27 NOTE — PROGRESS NOTES
SUBJECTIVE:  Natividad Mcginnis is an 79 year old  postmenopausal woman   who presents for annual gyn exam.   No bleeding, spotting, or discharge noted.    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 She continues to apply the cream daily.     Past Medical History:   Diagnosis Date     Anxiety      Depressive disorder      Thyroid disease           Family History   Problem Relation Age of Onset     Breast Cancer Mother         ? in her 40's     Cancer Mother 49        lung cancer     Cancer Father         lung cancer     Heart Disease Father         hardening of arteries     Colon Cancer Sister         1 -dxed at age of 75     Cancer - colorectal Sister      Heart Defect Son 52       Past Surgical History:   Procedure Laterality Date     3        APPENDECTOMY      as a child      COLONOSCOPY       COLONOSCOPY N/A 10/6/2014    Procedure: COLONOSCOPY;  Surgeon: Billie Araiza MD;  Location: [facility]     TUBAL LIGATION       US BREAST BIOPSY VACUUM LEFT      normal, whlie ago       Current Outpatient Medications   Medication     alclomethasone (ACLOVATE) 0.05 % ointment     amLODIPine (NORVASC) 10 MG tablet     calcium-vitamin D (CALCIUM 600/VITAMIN D) 600-400 MG-UNIT per tablet     gabapentin (NEURONTIN) 300 MG capsule     levothyroxine (SYNTHROID/LEVOTHROID) 75 MCG tablet     lisinopril (PRINIVIL/ZESTRIL) 20 MG tablet     multivitamin, therapeutic with minerals (MULTI-VITAMIN) TABS     risperiDONE (RISPERDAL) 0.5 MG tablet     sertraline (ZOLOFT) 100 MG tablet     No current facility-administered medications for this visit.      Allergies   Allergen Reactions     Hydrocortisone Valerate   "    reactioin     Latex      Bad reaction     Nickel Sulfate      reaction     No Clinical Screening - See Comments      Reaction to Jasamine absolute, Thiruam mix, Quaternium, gold, Amidoamine, Glutaraldehyde ----BAD REACTION     Shampoos [Dhs]      clairol shampoo     Tert-Butylhydroquinone      Bad reaction       Social History     Tobacco Use     Smoking status: Former Smoker     Types: Cigarettes     Smokeless tobacco: Never Used     Tobacco comment: quit 1993   Substance Use Topics     Alcohol use: Yes     Alcohol/week: 3.0 standard drinks     Types: 3 Glasses of wine per week       Review Of Systems  Ears/Nose/Throat: negative  Respiratory: No shortness of breath, dyspnea on exertion, cough, or hemoptysis  Cardiovascular: negative  Gastrointestinal: negative  Genitourinary: negative  Constitutional, HEENT, cardiovascular, pulmonary, GI, , musculoskeletal, neuro, skin, endocrine and psych systems are negative, except as otherwise noted.    This document serves as a record of the services and decisions personally performed and made by Maida Corrales DO. It was created on her behalf by Amanda Uribe, a trained medical scribe. The creation of this document is based on the provider's statements to the medical scribe.  Amanda Uribe 10:19 AM January 27, 2020    OBJECTIVE:  BP (!) 144/68   Ht 1.651 m (5' 5\")   Wt 64.4 kg (142 lb)   BMI 23.63 kg/m    General appearance: healthy, alert and no distress  Skin: Skin color, texture, turgor normal. No rashes or lesions.  Ears: negative  Nose/Sinuses: Nares normal. Septum midline. Mucosa normal. No drainage or sinus tenderness.  Oropharynx: Lips, mucosa, and tongue normal. Teeth and gums normal.  Neck: Neck supple. No adenopathy. Thyroid symmetric, normal size,, Carotids without bruits.  Lungs: negative, Percussion normal. Good diaphragmatic excursion. Lungs clear  Heart: negative, PMI normal. No lifts, heaves, or thrills. RRR. No murmurs, clicks gallops or " rub  Breasts: Inspection negative. No nipple discharge or bleeding. No masses.  Abdomen: Abdomen soft, non-tender. BS normal. No masses, organomegaly  Pelvic: Pelvic:  Pelvic examination without pap/ Gonorrhea and Chlamydia   including  External genitalia normal   and vagina normal rugatted not atrophic  Examination of urethra  normal no masses, tenderness, scarring  bladder, no masses or tenderness  Cervix no lesions or discharge  Bimanual exam with   Uterus 6 weeks size, mid position, mobile, no tenderness, no descent   Adnexa/parametria  Normal, no masses     ASSESSMENT:  Natividad Mcginnis is an 79 year old  postmenopausal woman   who presents for annual gyn exam.     PLAN:  Dx: Annual gyn physical   1)  Mammogram ordered   2)  Lichen sclerosis: refills provided   3) Return: in six months for recheck     Rx: - alclomethasone (ACLOVATE) 0.05 % ointment; Apply topically daily      PE:  Reviewed health maintenance including diet, regular exercise,   estrogen replacement and periodic exams.    The information in this document, created by the medical scribe for me, accurately reflects the services I personally performed and the decisions made by me. I have reviewed and approved this document for accuracy prior to leaving the patient care area.  2020 10:24 AM    Dr. Maida Corrales, DO    Obstetrics and Gynecology  [facility] and [place]

## 2020-01-27 NOTE — PATIENT INSTRUCTIONS
Return in Summer Maddi     Dr. Maida Corrales, DO    Obstetrics and Gynecology  [facility] [place] [facility] [place]

## 2020-02-21 ENCOUNTER — TRANSFERRED RECORDS (OUTPATIENT)
Dept: HEALTH INFORMATION MANAGEMENT | Facility: CLINIC | Age: 80
End: 2020-02-21

## 2020-02-21 LAB — TSH SERPL-ACNC: 0.79 UIU/ML (ref 0.3–5)

## 2020-05-13 ENCOUNTER — VIRTUAL VISIT (OUTPATIENT)
Dept: FAMILY MEDICINE | Facility: CLINIC | Age: 80
End: 2020-05-13
Payer: MEDICARE

## 2020-05-13 DIAGNOSIS — F33.3 SEVERE RECURRENT MAJOR DEPRESSIVE DISORDER WITH PSYCHOTIC FEATURES (H): ICD-10-CM

## 2020-05-13 DIAGNOSIS — F41.9 ANXIETY: ICD-10-CM

## 2020-05-13 DIAGNOSIS — I10 BENIGN ESSENTIAL HYPERTENSION: ICD-10-CM

## 2020-05-13 PROCEDURE — 99441 ZZC PHYSICIAN TELEPHONE EVALUATION 5-10 MIN GOVT: CPT | Performed by: PHYSICIAN ASSISTANT

## 2020-05-13 RX ORDER — GABAPENTIN 300 MG/1
300 CAPSULE ORAL 3 TIMES DAILY
Qty: 270 CAPSULE | Refills: 3 | Status: SHIPPED | OUTPATIENT
Start: 2020-05-13 | End: 2021-06-15

## 2020-05-13 RX ORDER — LISINOPRIL 20 MG/1
20 TABLET ORAL DAILY
Qty: 90 TABLET | Refills: 1 | Status: SHIPPED | OUTPATIENT
Start: 2020-05-13 | End: 2020-12-22

## 2020-05-13 RX ORDER — AMLODIPINE BESYLATE 10 MG/1
10 TABLET ORAL DAILY
Qty: 90 TABLET | Refills: 1 | Status: SHIPPED | OUTPATIENT
Start: 2020-05-13 | End: 2020-12-22

## 2020-05-13 RX ORDER — VALACYCLOVIR HYDROCHLORIDE 1 G/1
TABLET, FILM COATED ORAL
COMMUNITY
Start: 2020-05-08

## 2020-05-13 RX ORDER — SERTRALINE HYDROCHLORIDE 100 MG/1
100 TABLET, FILM COATED ORAL DAILY
Qty: 90 TABLET | Refills: 3 | Status: SHIPPED | OUTPATIENT
Start: 2020-05-13 | End: 2021-07-01

## 2020-05-13 RX ORDER — PREDNISOLONE ACETATE 10 MG/ML
SUSPENSION/ DROPS OPHTHALMIC
COMMUNITY
Start: 2020-05-08

## 2020-05-13 ASSESSMENT — PATIENT HEALTH QUESTIONNAIRE - PHQ9: SUM OF ALL RESPONSES TO PHQ QUESTIONS 1-9: 1

## 2020-05-13 NOTE — PROGRESS NOTES
"Natividad Mcginnis is a 79 year old female who is being evaluated via a billable telephone visit.      The patient has been notified of following:     \"This telephone visit will be conducted via a call between you and your physician/provider. We have found that certain health care needs can be provided without the need for a physical exam.  This service lets us provide the care you need with a short phone conversation.  If a prescription is necessary we can send it directly to your pharmacy.  If lab work is needed we can place an order for that and you can then stop by our lab to have the test done at a later time.    Telephone visits are billed at different rates depending on your insurance coverage. During this emergency period, for some insurers they may be billed the same as an in-person visit.  Please reach out to your insurance provider with any questions.    If during the course of the call the physician/provider feels a telephone visit is not appropriate, you will not be charged for this service.\"    Patient has given verbal consent for Telephone visit?  Yes    What phone number would you like to be contacted at? [phone]    How would you like to obtain your AVS? Mail a copy    Subjective     Natividad Mcginnis is a 79 year old female who presents to clinic today for the following health issues:    HPI  Hypertension Follow-up      Do you check your blood pressure regularly outside of the clinic? No     Are you following a low salt diet? Yes    Are your blood pressures ever more than 140 on the top number (systolic) OR more   than 90 on the bottom number (diastolic), for example 140/90? No      How many servings of fruits and vegetables do you eat daily?  4 or more    On average, how many sweetened beverages do you drink each day (Examples: soda, juice, sweet tea, etc.  Do NOT count diet or artificially sweetened beverages)?   0    How many days per week do you exercise enough to make your heart beat faster? 3 " or less    How many minutes a day do you exercise enough to make your heart beat faster? 9 or less    How many days per week do you miss taking your medication? 0         Medication Followup of Sertraline    Taking Medication as prescribed: yes    Side Effects:  None    Medication Helping Symptoms:  yes   Anxiety and depression are well controlled.     Current Outpatient Medications   Medication Sig Dispense Refill     alclomethasone (ACLOVATE) 0.05 % ointment Apply topically daily 60 g 11     amLODIPine (NORVASC) 10 MG tablet Take 1 tablet (10 mg) by mouth daily 90 tablet 1     calcium-vitamin D (CALCIUM 600/VITAMIN D) 600-400 MG-UNIT per tablet Take 1 tablet by mouth 2 times daily. 60 tablet      gabapentin (NEURONTIN) 300 MG capsule Take 1 capsule (300 mg) by mouth 3 times daily 270 capsule 3     levothyroxine (SYNTHROID/LEVOTHROID) 75 MCG tablet   3     lisinopril (ZESTRIL) 20 MG tablet Take 1 tablet (20 mg) by mouth daily 90 tablet 1     multivitamin, therapeutic with minerals (MULTI-VITAMIN) TABS Take 1 tablet by mouth daily. 100 tablet 3     prednisoLONE acetate (PRED FORTE) 1 % ophthalmic suspension        risperiDONE (RISPERDAL) 0.5 MG tablet TAKE 1 TABLET BY MOUTH DAILY AT BEDTIME. 90 tablet 2     sertraline (ZOLOFT) 100 MG tablet Take 1 tablet (100 mg) by mouth daily 90 tablet 3     valACYclovir (VALTREX) 1000 mg tablet        Recent Labs   Lab Test 02/21/20 04/16/19  1132  07/02/18  1201  08/05/15  0055  01/15/15  0859 06/06/13  1114   LDL  --   --   --  111*  --   --   --  105 67   HDL  --   --   --  81  --   --   --  91 88   TRIG  --   --   --  174*  --   --   --  143 112   ALT  --  24  --  25  --  16   < >  --   --    CR  --  0.57  --  0.50*   < > 0.71   < >  --   --    GFRESTIMATED  --  90  --  >90   < > 81   < >  --   --    GFRESTBLACK  --  >90  --  >90   < > >90  African American GFR Calc     < >  --   --    POTASSIUM  --  4.5  --  4.0   < > 3.4   < >  --   --    TSH 0.79 2.64   < > 2.36   < >  --     < >  --  0.98    < > = values in this interval not displayed.      BP Readings from Last 3 Encounters:   01/27/20 (!) 144/68   12/06/19 138/70   11/22/19 (!) 160/58    Wt Readings from Last 3 Encounters:   01/27/20 64.4 kg (142 lb)   11/14/19 67.2 kg (148 lb 3.2 oz)   04/16/19 65.8 kg (145 lb 1.6 oz)                    Reviewed and updated as needed this visit by Provider         Review of Systems   Constitutional, HEENT, cardiovascular, pulmonary, gi and gu systems are negative, except as otherwise noted.       Objective   Reported vitals:  There were no vitals taken for this visit.   healthy, alert and no distress  PSYCH: Alert and oriented times 3; coherent speech, normal   rate and volume, able to articulate logical thoughts, able   to abstract reason, no tangential thoughts, no hallucinations   or delusions  Her affect is normal  RESP: No cough, no audible wheezing, able to talk in full sentences  Remainder of exam unable to be completed due to telephone visits    Diagnostic Test Results:  Labs reviewed in Epic        Assessment/Plan:  1. Anxiety  Stable   - sertraline (ZOLOFT) 100 MG tablet; Take 1 tablet (100 mg) by mouth daily  Dispense: 90 tablet; Refill: 3    2. 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 Benign essential hypertension  Stable.  Needs labs for kidney function but well put off for 2-3 months due to covid-19.  She well follow-up for physical 1 week after labs.   - lisinopril (ZESTRIL) 20 MG tablet; Take 1 tablet (20 mg) by mouth daily  Dispense: 90 tablet; Refill: 1  - amLODIPine (NORVASC) 10 MG tablet; Take 1 tablet (10 mg) by mouth daily  Dispense: 90 tablet; Refill: 1  - CBC with platelets; Future  - TSH with free T4  reflex; Future  - Comprehensive metabolic panel; Future  - Lipid panel reflex to direct LDL Fasting; Future  - Albumin Random Urine Quantitative with Creat Ratio; Future    No follow-ups on file.      Phone call duration:  10 minutes    Ramona Ann Aaseby-Aguilera, PA-C

## 2020-06-02 DIAGNOSIS — F33.3 SEVERE RECURRENT MAJOR DEPRESSIVE DISORDER WITH PSYCHOTIC FEATURES (H): ICD-10-CM

## 2020-06-02 RX ORDER — RISPERIDONE 0.5 MG/1
TABLET ORAL
Qty: 90 TABLET | Refills: 0 | Status: SHIPPED | OUTPATIENT
Start: 2020-06-02 | End: 2020-09-15

## 2020-06-02 NOTE — TELEPHONE ENCOUNTER
Routing refill request to provider for review/approval because:  Labs not current:  See protocol     She did schedule lab for tomorrow     Lily Rosado RN

## 2020-06-03 DIAGNOSIS — R79.89 LOW SERUM SODIUM: Primary | ICD-10-CM

## 2020-06-03 DIAGNOSIS — I10 BENIGN ESSENTIAL HYPERTENSION: ICD-10-CM

## 2020-06-03 LAB
ERYTHROCYTE [DISTWIDTH] IN BLOOD BY AUTOMATED COUNT: 14 % (ref 10–15)
HCT VFR BLD AUTO: 36.8 % (ref 35–47)
HGB BLD-MCNC: 12.1 G/DL (ref 11.7–15.7)
MCH RBC QN AUTO: 30.3 PG (ref 26.5–33)
MCHC RBC AUTO-ENTMCNC: 32.9 G/DL (ref 31.5–36.5)
MCV RBC AUTO: 92 FL (ref 78–100)
PLATELET # BLD AUTO: 280 10E9/L (ref 150–450)
RBC # BLD AUTO: 4 10E12/L (ref 3.8–5.2)
WBC # BLD AUTO: 9.2 10E9/L (ref 4–11)

## 2020-06-03 PROCEDURE — 36415 COLL VENOUS BLD VENIPUNCTURE: CPT | Performed by: PHYSICIAN ASSISTANT

## 2020-06-03 PROCEDURE — 84443 ASSAY THYROID STIM HORMONE: CPT | Performed by: PHYSICIAN ASSISTANT

## 2020-06-03 PROCEDURE — 85027 COMPLETE CBC AUTOMATED: CPT | Performed by: PHYSICIAN ASSISTANT

## 2020-06-03 PROCEDURE — 82043 UR ALBUMIN QUANTITATIVE: CPT | Performed by: PHYSICIAN ASSISTANT

## 2020-06-03 PROCEDURE — 80053 COMPREHEN METABOLIC PANEL: CPT | Performed by: PHYSICIAN ASSISTANT

## 2020-06-03 PROCEDURE — 80061 LIPID PANEL: CPT | Performed by: PHYSICIAN ASSISTANT

## 2020-06-04 LAB
ALBUMIN SERPL-MCNC: 3.8 G/DL (ref 3.4–5)
ALP SERPL-CCNC: 95 U/L (ref 40–150)
ALT SERPL W P-5'-P-CCNC: 24 U/L (ref 0–50)
ANION GAP SERPL CALCULATED.3IONS-SCNC: 7 MMOL/L (ref 3–14)
AST SERPL W P-5'-P-CCNC: 18 U/L (ref 0–45)
BILIRUB SERPL-MCNC: 0.3 MG/DL (ref 0.2–1.3)
BUN SERPL-MCNC: 11 MG/DL (ref 7–30)
CALCIUM SERPL-MCNC: 9.2 MG/DL (ref 8.5–10.1)
CHLORIDE SERPL-SCNC: 98 MMOL/L (ref 94–109)
CHOLEST SERPL-MCNC: 231 MG/DL
CO2 SERPL-SCNC: 26 MMOL/L (ref 20–32)
CREAT SERPL-MCNC: 0.61 MG/DL (ref 0.52–1.04)
CREAT UR-MCNC: 10 MG/DL
GFR SERPL CREATININE-BSD FRML MDRD: 87 ML/MIN/{1.73_M2}
GLUCOSE SERPL-MCNC: 95 MG/DL (ref 70–99)
HDLC SERPL-MCNC: 84 MG/DL
LDLC SERPL CALC-MCNC: 104 MG/DL
MICROALBUMIN UR-MCNC: <5 MG/L
MICROALBUMIN/CREAT UR: NORMAL MG/G CR (ref 0–25)
NONHDLC SERPL-MCNC: 147 MG/DL
POTASSIUM SERPL-SCNC: 4.9 MMOL/L (ref 3.4–5.3)
PROT SERPL-MCNC: 8 G/DL (ref 6.8–8.8)
SODIUM SERPL-SCNC: 131 MMOL/L (ref 133–144)
TRIGL SERPL-MCNC: 214 MG/DL
TSH SERPL DL<=0.005 MIU/L-ACNC: 1.69 MU/L (ref 0.4–4)

## 2020-08-08 ENCOUNTER — ANCILLARY PROCEDURE (OUTPATIENT)
Dept: MAMMOGRAPHY | Facility: CLINIC | Age: 80
End: 2020-08-08
Payer: MEDICARE

## 2020-08-08 DIAGNOSIS — Z12.31 ENCOUNTER FOR SCREENING MAMMOGRAM FOR MALIGNANT NEOPLASM OF BREAST: ICD-10-CM

## 2020-08-08 DIAGNOSIS — Z00.00 ENCOUNTER FOR PREVENTATIVE ADULT HEALTH CARE EXAMINATION: ICD-10-CM

## 2020-08-08 PROCEDURE — 77067 SCR MAMMO BI INCL CAD: CPT | Mod: TC

## 2020-09-14 DIAGNOSIS — F33.3 SEVERE RECURRENT MAJOR DEPRESSIVE DISORDER WITH PSYCHOTIC FEATURES (H): ICD-10-CM

## 2020-09-14 NOTE — TELEPHONE ENCOUNTER
Routing refill request to provider for review/approval because:  Labs out of range:  bp  Mariah Lamas RN, BSN

## 2020-09-15 RX ORDER — RISPERIDONE 0.5 MG/1
TABLET ORAL
Qty: 90 TABLET | Refills: 0 | Status: SHIPPED | OUTPATIENT
Start: 2020-09-15 | End: 2020-11-17

## 2020-11-14 DIAGNOSIS — F33.3 SEVERE RECURRENT MAJOR DEPRESSIVE DISORDER WITH PSYCHOTIC FEATURES (H): ICD-10-CM

## 2020-11-16 NOTE — TELEPHONE ENCOUNTER
Routing refill request to provider for review/approval because:  Labs out of range:  BP  Patient needs to be seen because:  Per RN protocol pt needs to be seen every 6 months  Mariah Lamas RN, BSN

## 2020-11-17 RX ORDER — RISPERIDONE 0.5 MG/1
TABLET ORAL
Qty: 90 TABLET | Refills: 0 | Status: SHIPPED | OUTPATIENT
Start: 2020-11-17 | End: 2021-03-23

## 2020-12-21 DIAGNOSIS — I10 BENIGN ESSENTIAL HYPERTENSION: ICD-10-CM

## 2020-12-22 RX ORDER — AMLODIPINE BESYLATE 10 MG/1
10 TABLET ORAL DAILY
Qty: 90 TABLET | Refills: 0 | Status: SHIPPED | OUTPATIENT
Start: 2020-12-22 | End: 2021-03-23

## 2020-12-22 RX ORDER — LISINOPRIL 20 MG/1
TABLET ORAL
Qty: 90 TABLET | Refills: 0 | Status: SHIPPED | OUTPATIENT
Start: 2020-12-22 | End: 2021-03-23

## 2021-02-19 DIAGNOSIS — L90.0 LICHEN SCLEROSUS: ICD-10-CM

## 2021-02-19 RX ORDER — ALCLOMETASONE DIPROPIONATE 0.5 MG/G
OINTMENT TOPICAL DAILY
Qty: 60 G | Refills: 0 | Status: SHIPPED | OUTPATIENT
Start: 2021-02-19 | End: 2021-05-24

## 2021-02-25 ENCOUNTER — TRANSFERRED RECORDS (OUTPATIENT)
Dept: HEALTH INFORMATION MANAGEMENT | Facility: CLINIC | Age: 81
End: 2021-02-25

## 2021-02-25 LAB — TSH SERPL-ACNC: 1.93 UIU/ML (ref 0.3–5)

## 2021-03-22 DIAGNOSIS — I10 BENIGN ESSENTIAL HYPERTENSION: ICD-10-CM

## 2021-03-22 DIAGNOSIS — F33.3 SEVERE RECURRENT MAJOR DEPRESSIVE DISORDER WITH PSYCHOTIC FEATURES (H): ICD-10-CM

## 2021-03-23 RX ORDER — LISINOPRIL 20 MG/1
TABLET ORAL
Qty: 90 TABLET | Refills: 0 | Status: SHIPPED | OUTPATIENT
Start: 2021-03-23 | End: 2021-06-15

## 2021-03-23 RX ORDER — AMLODIPINE BESYLATE 10 MG/1
10 TABLET ORAL DAILY
Qty: 90 TABLET | Refills: 0 | Status: SHIPPED | OUTPATIENT
Start: 2021-03-23 | End: 2021-06-15

## 2021-03-23 RX ORDER — RISPERIDONE 0.5 MG/1
TABLET ORAL
Qty: 90 TABLET | Refills: 0 | Status: SHIPPED | OUTPATIENT
Start: 2021-03-23 | End: 2021-07-01

## 2021-03-23 NOTE — TELEPHONE ENCOUNTER
Routing refill request to provider for review/approval because:  Patient needs to be seen because:  Failing visit  Failing bp  Failing pulse    Catie Noe RN

## 2021-05-24 DIAGNOSIS — L90.0 LICHEN SCLEROSUS: ICD-10-CM

## 2021-05-24 RX ORDER — ALCLOMETASONE DIPROPIONATE 0.5 MG/G
OINTMENT TOPICAL
Qty: 60 G | Refills: 0 | Status: SHIPPED | OUTPATIENT
Start: 2021-05-24 | End: 2021-08-02

## 2021-06-14 DIAGNOSIS — F33.3 SEVERE RECURRENT MAJOR DEPRESSIVE DISORDER WITH PSYCHOTIC FEATURES (H): ICD-10-CM

## 2021-06-14 DIAGNOSIS — I10 BENIGN ESSENTIAL HYPERTENSION: ICD-10-CM

## 2021-06-14 NOTE — TELEPHONE ENCOUNTER
Routing refill request to provider for review/approval because:  Gail given x1 and patient did not follow up, please advise  Labs out of range:  BP  Labs not current:  Creatinine, potassium  Patient needs to be seen because it has been more than 1 year since last office visit.    BP Readings from Last 3 Encounters:   01/27/20 (!) 144/68   12/06/19 138/70   11/22/19 (!) 160/58     Reginald HILLIARD RN

## 2021-06-15 RX ORDER — LISINOPRIL 20 MG/1
TABLET ORAL
Qty: 30 TABLET | Refills: 0 | Status: SHIPPED | OUTPATIENT
Start: 2021-06-15 | End: 2021-07-01

## 2021-06-15 RX ORDER — AMLODIPINE BESYLATE 10 MG/1
10 TABLET ORAL DAILY
Qty: 30 TABLET | Refills: 0 | Status: SHIPPED | OUTPATIENT
Start: 2021-06-15 | End: 2021-07-01

## 2021-06-15 RX ORDER — GABAPENTIN 300 MG/1
CAPSULE ORAL
Qty: 90 CAPSULE | Refills: 0 | Status: SHIPPED | OUTPATIENT
Start: 2021-06-15 | End: 2021-07-01

## 2021-07-01 ENCOUNTER — OFFICE VISIT (OUTPATIENT)
Dept: FAMILY MEDICINE | Facility: CLINIC | Age: 81
End: 2021-07-01
Payer: MEDICARE

## 2021-07-01 VITALS
BODY MASS INDEX: 25.38 KG/M2 | HEART RATE: 78 BPM | TEMPERATURE: 98.1 F | DIASTOLIC BLOOD PRESSURE: 68 MMHG | SYSTOLIC BLOOD PRESSURE: 139 MMHG | WEIGHT: 152.5 LBS

## 2021-07-01 DIAGNOSIS — R09.89 BILATERAL CAROTID BRUITS: ICD-10-CM

## 2021-07-01 DIAGNOSIS — I65.21 CAROTID ARTERY STENOSIS, ASYMPTOMATIC, RIGHT: ICD-10-CM

## 2021-07-01 DIAGNOSIS — Z11.59 NEED FOR HEPATITIS C SCREENING TEST: ICD-10-CM

## 2021-07-01 DIAGNOSIS — I10 BENIGN ESSENTIAL HYPERTENSION: Primary | ICD-10-CM

## 2021-07-01 DIAGNOSIS — F41.9 ANXIETY: ICD-10-CM

## 2021-07-01 DIAGNOSIS — Z13.0 SCREENING FOR BLOOD DISEASE: ICD-10-CM

## 2021-07-01 DIAGNOSIS — F33.3 SEVERE RECURRENT MAJOR DEPRESSIVE DISORDER WITH PSYCHOTIC FEATURES (H): ICD-10-CM

## 2021-07-01 LAB
ERYTHROCYTE [DISTWIDTH] IN BLOOD BY AUTOMATED COUNT: 12.6 % (ref 10–15)
HCT VFR BLD AUTO: 37.2 % (ref 35–47)
HGB BLD-MCNC: 12.1 G/DL (ref 11.7–15.7)
MCH RBC QN AUTO: 31.7 PG (ref 26.5–33)
MCHC RBC AUTO-ENTMCNC: 32.5 G/DL (ref 31.5–36.5)
MCV RBC AUTO: 97 FL (ref 78–100)
PLATELET # BLD AUTO: 241 10E9/L (ref 150–450)
RBC # BLD AUTO: 3.82 10E12/L (ref 3.8–5.2)
WBC # BLD AUTO: 7.8 10E9/L (ref 4–11)

## 2021-07-01 PROCEDURE — 86803 HEPATITIS C AB TEST: CPT | Performed by: PHYSICIAN ASSISTANT

## 2021-07-01 PROCEDURE — 36415 COLL VENOUS BLD VENIPUNCTURE: CPT | Performed by: PHYSICIAN ASSISTANT

## 2021-07-01 PROCEDURE — 99214 OFFICE O/P EST MOD 30 MIN: CPT | Performed by: PHYSICIAN ASSISTANT

## 2021-07-01 PROCEDURE — 85027 COMPLETE CBC AUTOMATED: CPT | Performed by: PHYSICIAN ASSISTANT

## 2021-07-01 PROCEDURE — 80048 BASIC METABOLIC PNL TOTAL CA: CPT | Performed by: PHYSICIAN ASSISTANT

## 2021-07-01 RX ORDER — SERTRALINE HYDROCHLORIDE 100 MG/1
100 TABLET, FILM COATED ORAL DAILY
Qty: 90 TABLET | Refills: 3 | Status: SHIPPED | OUTPATIENT
Start: 2021-07-01 | End: 2021-12-09

## 2021-07-01 RX ORDER — LISINOPRIL 20 MG/1
20 TABLET ORAL DAILY
Qty: 90 TABLET | Refills: 1 | Status: SHIPPED | OUTPATIENT
Start: 2021-07-01 | End: 2021-12-09

## 2021-07-01 RX ORDER — RISPERIDONE 0.5 MG/1
0.5 TABLET ORAL AT BEDTIME
Qty: 90 TABLET | Refills: 3 | Status: SHIPPED | OUTPATIENT
Start: 2021-07-01 | End: 2021-12-09

## 2021-07-01 RX ORDER — GABAPENTIN 300 MG/1
CAPSULE ORAL
Qty: 270 CAPSULE | Refills: 3 | Status: SHIPPED | OUTPATIENT
Start: 2021-07-01 | End: 2022-08-09

## 2021-07-01 RX ORDER — LEVOTHYROXINE SODIUM 100 UG/1
TABLET ORAL
COMMUNITY
Start: 2021-02-23 | End: 2021-07-01

## 2021-07-01 RX ORDER — AMLODIPINE BESYLATE 10 MG/1
10 TABLET ORAL DAILY
Qty: 90 TABLET | Refills: 1 | Status: SHIPPED | OUTPATIENT
Start: 2021-07-01 | End: 2021-12-09

## 2021-07-01 NOTE — LETTER
July 5, 2021      Natividad Mcginnis  [address] [place] [address]        Dear ,    We are writing to inform you of your test results.    - Your lab results look stable; everything is normal.     Resulted Orders   Hepatitis C Screen Reflex to HCV RNA Quant and Genotype   Result Value Ref Range    Hepatitis C Antibody Nonreactive NR^Nonreactive      Comment:      Assay performance characteristics have not been established for newborns,   infants, and children     BASIC METABOLIC PANEL   Result Value Ref Range    Sodium 142 133 - 144 mmol/L    Potassium 4.7 3.4 - 5.3 mmol/L    Chloride 108 94 - 109 mmol/L    Carbon Dioxide 27 20 - 32 mmol/L    Anion Gap 7 3 - 14 mmol/L    Glucose 95 70 - 99 mg/dL    Urea Nitrogen 13 7 - 30 mg/dL    Creatinine 0.69 0.52 - 1.04 mg/dL    GFR Estimate 83 >60 mL/min/[1.73_m2]      Comment:      Non  GFR Calc  Starting 12/18/2018, serum creatinine based estimated GFR (eGFR) will be   calculated using the Chronic Kidney Disease Epidemiology Collaboration   (CKD-EPI) equation.      GFR Estimate If Black >90 >60 mL/min/[1.73_m2]      Comment:       GFR Calc  Starting 12/18/2018, serum creatinine based estimated GFR (eGFR) will be   calculated using the Chronic Kidney Disease Epidemiology Collaboration   (CKD-EPI) equation.      Calcium 9.7 8.5 - 10.1 mg/dL   CBC with platelets   Result Value Ref Range    WBC 7.8 4.0 - 11.0 10e9/L    RBC Count 3.82 3.8 - 5.2 10e12/L    Hemoglobin 12.1 11.7 - 15.7 g/dL    Hematocrit 37.2 35.0 - 47.0 %    MCV 97 78 - 100 fl    MCH 31.7 26.5 - 33.0 pg    MCHC 32.5 31.5 - 36.5 g/dL    RDW 12.6 10.0 - 15.0 %    Platelet Count 241 150 - 450 10e9/L       If you have any questions or concerns, please call the clinic at the number listed above.       Sincerely,      Ramona Ann Aaseby-Aguilera, PA-C

## 2021-07-01 NOTE — PATIENT INSTRUCTIONS
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

## 2021-07-01 NOTE — PROGRESS NOTES
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  - risperiDONE (RISPERDAL) 0.5 MG tablet; Take 1 tablet (0.5 mg) by mouth At Bedtime    Anxiety    - sertraline (ZOLOFT) 100 MG tablet; Take 1 tablet (100 mg) by mouth daily    Screening for blood disease    - CBC with platelets    Bilateral carotid bruits  Well need to get US and determine plan once results known   - US Carotid Bilateral; Future      Return in about 6 months (around 1/1/2022) for BP Recheck.    Ramona Ann Aaseby-Aguilera, PA-C  [facility] is a 80 year old who presents for the following health issues     HPI     Medication Followup of  Zoloft, zestril, amlodopine, risperidone    Taking Medication as prescribed: yes    Side Effects:  None    Medication Helping Symptoms:  yes         Review of Systems   Constitutional, HEENT, cardiovascular, pulmonary, gi and gu systems are negative, except as otherwise noted.      Objective    BP (!) 142/68 (BP Location: Right arm, Patient Position: Sitting, Cuff Size: Adult Regular)   Pulse 78   Temp 98.1  F (36.7  C) (Oral)   Wt 69.2 kg (152 lb 8 oz)   BMI 25.38 kg/m    Body mass index is 25.38 kg/m .  Physical Exam   GENERAL: healthy, alert and no distress  HENT: ear canals and TM's normal, nose and mouth without ulcers or lesions  RESP: lungs clear to auscultation - no rales, rhonchi or wheezes  CV: regular rate and rhythm,  normal S1 S2, no S3 or S4, no murmur, click or rub, no peripheral edema and peripheral pulses strong  CV:  bruit heard carotid bilaterally   PSYCH: mentation appears normal, affect normal/bright

## 2021-07-02 ENCOUNTER — HOSPITAL ENCOUNTER (OUTPATIENT)
Dept: ULTRASOUND IMAGING | Facility: CLINIC | Age: 81
Discharge: HOME | End: 2021-07-02
Attending: PHYSICIAN ASSISTANT | Admitting: PHYSICIAN ASSISTANT
Payer: MEDICARE

## 2021-07-02 DIAGNOSIS — R09.89 BILATERAL CAROTID BRUITS: ICD-10-CM

## 2021-07-02 LAB
ANION GAP SERPL CALCULATED.3IONS-SCNC: 7 MMOL/L (ref 3–14)
BUN SERPL-MCNC: 13 MG/DL (ref 7–30)
CALCIUM SERPL-MCNC: 9.7 MG/DL (ref 8.5–10.1)
CHLORIDE SERPL-SCNC: 108 MMOL/L (ref 94–109)
CO2 SERPL-SCNC: 27 MMOL/L (ref 20–32)
CREAT SERPL-MCNC: 0.69 MG/DL (ref 0.52–1.04)
GFR SERPL CREATININE-BSD FRML MDRD: 83 ML/MIN/{1.73_M2}
GLUCOSE SERPL-MCNC: 95 MG/DL (ref 70–99)
HCV AB SERPL QL IA: NONREACTIVE
POTASSIUM SERPL-SCNC: 4.7 MMOL/L (ref 3.4–5.3)
SODIUM SERPL-SCNC: 142 MMOL/L (ref 133–144)

## 2021-07-02 PROCEDURE — 93880 EXTRACRANIAL BILAT STUDY: CPT

## 2021-07-02 RX ORDER — ATORVASTATIN CALCIUM 10 MG/1
5 TABLET, FILM COATED ORAL DAILY
Qty: 45 TABLET | Refills: 3 | Status: SHIPPED | OUTPATIENT
Start: 2021-07-02 | End: 2022-04-20

## 2021-07-02 NOTE — RESULT ENCOUNTER NOTE
Dear Natividad,    It was a pleasure to see you at your recent visit.      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  The left carotid is less than 50 % stenosis.  Treatment for this is daily aspirin 81 mg and possible adding a statin like atorvastatin.  I am sending in a low dose atorvastatin.  Please let me know if you have any questions.     I would recommend:      Results for orders placed or performed during the hospital encounter of 07/02/21  -US Carotid Bilateral     Status: None      Narrative    EXAM: US CAROTID BILATERAL    DATE/TIME: 7/2/2021 2:48 PM        INDICATION: Carotid bruit    COMPARISON: None    TECHNIQUE: Duplex exam performed utilizing 2D gray-scale imaging,    Doppler interrogation with color-flow and spectral waveform analysis.        FINDINGS:    RIGHT: There is trace atheromatous plaque. Normal waveforms.        LEFT: There is trace atheromatous plaque. Normal waveforms with no    significant stenosis.        Both vertebral arteries demonstrate antegrade flow.         VELOCITY CHART:    The following velocities were obtained in the RIGHT carotid system.    CCA: 121/18 cm/s    ICA: 142/23 cm/s    ECA: 207/19 cm/s    ICA/CCA: PS 1.4        The following velocities were obtained in the LEFT carotid system.    CCA: 153/20 cm/s    ICA: 122/23 cm/s    ECA: 209/24 cm/s    ICA/CCA: PS 1.0          Impression    IMPRESSION:    1. RIGHT: Trace atheromatous plaque. Moderate internal carotid artery    stenosis (50-69%).    2. LEFT: Trace atheromatous plaque. No significant internal  carotid    artery stenosis (less than 50%).        Evaluation based on velocities and NASCET criteria.        RAUL FERREIRA MD                SYSTEM ID:  [id number]      Thank you for choosing [facility]. We appreciate the opportunity to serve   you and look forward to supporting your healthcare needs in the future.    If you have any questions or concerns, please contact us at [phone]      Sincerely,        Ramona Aaseby-Aguilera PA-C

## 2021-07-02 NOTE — LETTER
July 5, 2021      Natividad Mcginnis  [address] [place] [address]        Dear ,    We are writing to inform you of your test results.    The results of your recent total carotid US showed a moderate stenosis ( 50 - 69% )  in right carotid.  The left carotid is less than 50 % stenosis.  Treatment for this is daily aspirin 81 mg and possible adding a statin like atorvastatin.  I am sending in a low dose atorvastatin.  Please let me know if you have any questions.     Resulted Orders   US Carotid Bilateral    Narrative    EXAM: US CAROTID BILATERAL  DATE/TIME: 7/2/2021 2:48 PM    INDICATION: Carotid bruit  COMPARISON: None  TECHNIQUE: Duplex exam performed utilizing 2D gray-scale imaging,  Doppler interrogation with color-flow and spectral waveform analysis.    FINDINGS:  RIGHT: There is trace atheromatous plaque. Normal waveforms.    LEFT: There is trace atheromatous plaque. Normal waveforms with no  significant stenosis.    Both vertebral arteries demonstrate antegrade flow.     VELOCITY CHART:  The following velocities were obtained in the RIGHT carotid system.  CCA: 121/18 cm/s  ICA: 142/23 cm/s  ECA: 207/19 cm/s  ICA/CCA: PS 1.4    The following velocities were obtained in the LEFT carotid system.  CCA: 153/20 cm/s  ICA: 122/23 cm/s  ECA: 209/24 cm/s  ICA/CCA: PS 1.0      Impression    IMPRESSION:  1. RIGHT: Trace atheromatous plaque. Moderate internal carotid artery  stenosis (50-69%).  2. LEFT: Trace atheromatous plaque. No significant internal carotid  artery stenosis (less than 50%).    Evaluation based on velocities and NASCET criteria.    RAUL FERREIRA MD          SYSTEM ID:  [id number]       If you have any questions or concerns, please call the clinic at the number listed above.       Sincerely,      Ramona Ann Aaseby-Aguilera, PA-C

## 2021-08-02 ENCOUNTER — OFFICE VISIT (OUTPATIENT)
Dept: OBGYN | Facility: CLINIC | Age: 81
End: 2021-08-02
Payer: MEDICARE

## 2021-08-02 VITALS
WEIGHT: 154.1 LBS | HEIGHT: 65 IN | DIASTOLIC BLOOD PRESSURE: 64 MMHG | BODY MASS INDEX: 25.67 KG/M2 | SYSTOLIC BLOOD PRESSURE: 128 MMHG

## 2021-08-02 DIAGNOSIS — L90.0 LICHEN SCLEROSUS: ICD-10-CM

## 2021-08-02 PROCEDURE — 99397 PER PM REEVAL EST PAT 65+ YR: CPT | Performed by: FAMILY MEDICINE

## 2021-08-02 PROCEDURE — G0101 CA SCREEN;PELVIC/BREAST EXAM: HCPCS | Performed by: FAMILY MEDICINE

## 2021-08-02 RX ORDER — ALCLOMETASONE DIPROPIONATE 0.5 MG/G
OINTMENT TOPICAL DAILY
Qty: 60 G | Refills: 11 | Status: SHIPPED | OUTPATIENT
Start: 2021-08-02 | End: 2022-09-13

## 2021-08-02 ASSESSMENT — MIFFLIN-ST. JEOR: SCORE: 1179.87

## 2021-08-02 NOTE — NURSING NOTE
"Chief Complaint   Patient presents with     Gyn Exam     mammo next Monday       Initial /64   Ht 1.651 m (5' 5\")   Wt 69.9 kg (154 lb 1.6 oz)   BMI 25.64 kg/m   Estimated body mass index is 25.64 kg/m  as calculated from the following:    Height as of this encounter: 1.651 m (5' 5\").    Weight as of this encounter: 69.9 kg (154 lb 1.6 oz).  BP completed using cuff size: regular    Questioned patient about current smoking habits.  Pt. quit smoking some time ago.          The following HM Due: NONE    "

## 2021-08-02 NOTE — PROGRESS NOTES
SUBJECTIVE:  Natividad Mcginnis is an 80 year old  postmenopausal woman   who presents for annual gyn exam. Menopause at age 50s. No   bleeding, spotting, or discharge noted. Concerns:  None     Estrogen replacement therapy: none currently, has taken in past  TED exposure: no  History of abnormal Pap smear: No  Family history of uterine or ovarian cancer: No  Regular self breast exam: Yes  History of abnormal mammogram: No  Family history of breast cancer: Yes: mother  History of abnormal lipids: No    Past Medical History:   Diagnosis Date     Anxiety      Depressive disorder      Thyroid disease           Family History   Problem Relation Age of Onset     Breast Cancer Mother         ? in her 40's     Cancer Mother 49        lung cancer     Cancer Father         lung cancer     Heart Disease Father         hardening of arteries     Colon Cancer Sister         1 -dxed at age of 75     Cancer - colorectal Sister      Heart Defect Son 52       Past Surgical History:   Procedure Laterality Date     3        APPENDECTOMY      as a child      COLONOSCOPY       COLONOSCOPY N/A 10/6/2014    Procedure: COLONOSCOPY;  Surgeon: Billie Araiza MD;  Location: [facility]     TUBAL LIGATION       US BREAST BIOPSY VACUUM LEFT      normal, whlie ago       Current Outpatient Medications   Medication     alclomethasone (ACLOVATE) 0.05 % ointment     amLODIPine (NORVASC) 10 MG tablet     aspirin (ASA) 81 MG EC tablet     atorvastatin (LIPITOR) 10 MG tablet     calcium-vitamin D (CALCIUM 600/VITAMIN D) 600-400 MG-UNIT per tablet     gabapentin (NEURONTIN) 300 MG capsule     levothyroxine (SYNTHROID/LEVOTHROID) 75 MCG tablet     lisinopril (ZESTRIL) 20 MG tablet     multivitamin, therapeutic with minerals (MULTI-VITAMIN) TABS     prednisoLONE acetate (PRED FORTE) 1 % ophthalmic suspension     risperiDONE (RISPERDAL) 0.5 MG tablet     sertraline (ZOLOFT) 100 MG tablet     valACYclovir (VALTREX) 1000 mg tablet     No current  "facility-administered medications for this visit.     Allergies   Allergen Reactions     Hydrocortisone Valerate      reactioin     Latex      Bad reaction     Nickel Sulfate      reaction     No Clinical Screening - See Comments      Reaction to Jasamine absolute, Thiruam mix, Quaternium, gold, Amidoamine, Glutaraldehyde ----BAD REACTION     Shampoos [Dhs]      clairol shampoo     Tert-Butylhydroquinone      Bad reaction       Social History     Tobacco Use     Smoking status: Former Smoker     Types: Cigarettes     Smokeless tobacco: Never Used     Tobacco comment: quit 1993   Substance Use Topics     Alcohol use: Yes     Alcohol/week: 3.0 standard drinks     Types: 3 Glasses of wine per week     Comment: a couple times a week       Review Of Systems  Ears/Nose/Throat: negative  Respiratory: No shortness of breath, dyspnea on exertion, cough, or hemoptysis  Cardiovascular: negative  Gastrointestinal: negative  Genitourinary: negative  Constitutional, HEENT, cardiovascular, pulmonary, GI, , musculoskeletal, neuro, skin, endocrine and psych systems are negative, except as otherwise noted.    OBJECTIVE:  /64   Ht 1.651 m (5' 5\")   Wt 69.9 kg (154 lb 1.6 oz)   BMI 25.64 kg/m    General appearance: healthy, alert and no distress  Skin: Skin color, texture, turgor normal. No rashes or lesions.  Ears: negative  Nose/Sinuses: Nares normal. Septum midline. Mucosa normal. No drainage or sinus tenderness.  Oropharynx: Lips, mucosa, and tongue normal. Teeth and gums normal.  Neck: Neck supple. No adenopathy. Thyroid symmetric, normal size,, Carotids without bruits.  Lungs: negative, Percussion normal. Good diaphragmatic excursion. Lungs clear  Heart: negative, PMI normal. No lifts, heaves, or thrills. RRR. No murmurs, clicks gallops or rub  Breasts: Inspection negative. No nipple discharge or bleeding. No masses.  Abdomen: Abdomen soft, non-tender. BS normal. No masses, organomegaly  Pelvic: Pelvic:  Pelvic " examination with no pap/no Gonorrhea and Chlamydia   including  External genitalia stable lichen sclerosis changes   and vagina normal rugatted atrophic  Examination of urethra  normal no masses, tenderness, scarring  bladder, no masses or tenderness  Cervix no lesions or discharge  Bimanual exam with   Uterus 5 weeks size, mid position, mobile,no-tenderness, no descent   Adnexa/parametria  Normal no masses     ASSESSMENT:  Natividad Mcginnis is an 80 year old  postmenopausal woman   who presents for annual gyn exam.     PLAN:  Dx:  1)  Pap smear, mammogram  2)  Lipids at appropriate intervals  3)  Covid-19 concerns: covid infection and vaccination recommendations discussed.   4)  Lichen sclerosis: stable, refill called in    PE:  Reviewed health maintenance including diet, regular exercise,   estrogen replacement and periodic exams.    Dr. Maida Corrales, DO    Obstetrics and Gynecology  [facility] - [place] [facility] [place]

## 2021-08-02 NOTE — PATIENT INSTRUCTIONS
Return yearly or as needed       Dr. Maida Corrales, DO    Obstetrics and Gynecology  [facility] - [place] and [place]

## 2021-08-09 ENCOUNTER — ANCILLARY PROCEDURE (OUTPATIENT)
Dept: MAMMOGRAPHY | Facility: CLINIC | Age: 81
End: 2021-08-09
Attending: FAMILY MEDICINE
Payer: MEDICARE

## 2021-08-09 DIAGNOSIS — Z12.31 VISIT FOR SCREENING MAMMOGRAM: ICD-10-CM

## 2021-08-09 PROCEDURE — 77067 SCR MAMMO BI INCL CAD: CPT | Mod: TC | Performed by: RADIOLOGY

## 2021-12-09 ENCOUNTER — OFFICE VISIT (OUTPATIENT)
Dept: FAMILY MEDICINE | Facility: CLINIC | Age: 81
End: 2021-12-09
Payer: MEDICARE

## 2021-12-09 VITALS
HEART RATE: 80 BPM | DIASTOLIC BLOOD PRESSURE: 60 MMHG | RESPIRATION RATE: 20 BRPM | TEMPERATURE: 98 F | WEIGHT: 144 LBS | SYSTOLIC BLOOD PRESSURE: 134 MMHG | BODY MASS INDEX: 23.96 KG/M2

## 2021-12-09 DIAGNOSIS — F41.9 ANXIETY: ICD-10-CM

## 2021-12-09 DIAGNOSIS — Z23 HIGH PRIORITY FOR 2019-NCOV VACCINE: Primary | ICD-10-CM

## 2021-12-09 DIAGNOSIS — I10 BENIGN ESSENTIAL HYPERTENSION: ICD-10-CM

## 2021-12-09 DIAGNOSIS — F33.3 SEVERE RECURRENT MAJOR DEPRESSIVE DISORDER WITH PSYCHOTIC FEATURES (H): ICD-10-CM

## 2021-12-09 PROCEDURE — 91306 COVID-19,PF,MODERNA (18+ YRS BOOSTER .25ML): CPT | Performed by: PHYSICIAN ASSISTANT

## 2021-12-09 PROCEDURE — 0064A COVID-19,PF,MODERNA (18+ YRS BOOSTER .25ML): CPT | Performed by: PHYSICIAN ASSISTANT

## 2021-12-09 PROCEDURE — 99214 OFFICE O/P EST MOD 30 MIN: CPT | Performed by: PHYSICIAN ASSISTANT

## 2021-12-09 PROCEDURE — 96127 BRIEF EMOTIONAL/BEHAV ASSMT: CPT | Performed by: PHYSICIAN ASSISTANT

## 2021-12-09 RX ORDER — SERTRALINE HYDROCHLORIDE 100 MG/1
100 TABLET, FILM COATED ORAL DAILY
Qty: 90 TABLET | Refills: 3 | Status: SHIPPED | OUTPATIENT
Start: 2021-12-09 | End: 2022-08-25

## 2021-12-09 RX ORDER — AMLODIPINE BESYLATE 10 MG/1
10 TABLET ORAL DAILY
Qty: 90 TABLET | Refills: 1 | Status: SHIPPED | OUTPATIENT
Start: 2021-12-09 | End: 2022-07-22

## 2021-12-09 RX ORDER — LISINOPRIL 20 MG/1
20 TABLET ORAL DAILY
Qty: 90 TABLET | Refills: 1 | Status: SHIPPED | OUTPATIENT
Start: 2021-12-09 | End: 2022-07-22

## 2021-12-09 RX ORDER — RISPERIDONE 0.5 MG/1
0.5 TABLET ORAL AT BEDTIME
Qty: 90 TABLET | Refills: 3 | Status: SHIPPED | OUTPATIENT
Start: 2021-12-09 | End: 2022-08-25

## 2021-12-09 ASSESSMENT — ANXIETY QUESTIONNAIRES
6. BECOMING EASILY ANNOYED OR IRRITABLE: SEVERAL DAYS
3. WORRYING TOO MUCH ABOUT DIFFERENT THINGS: NOT AT ALL
1. FEELING NERVOUS, ANXIOUS, OR ON EDGE: NOT AT ALL
GAD7 TOTAL SCORE: 1
7. FEELING AFRAID AS IF SOMETHING AWFUL MIGHT HAPPEN: NOT AT ALL
5. BEING SO RESTLESS THAT IT IS HARD TO SIT STILL: NOT AT ALL
IF YOU CHECKED OFF ANY PROBLEMS ON THIS QUESTIONNAIRE, HOW DIFFICULT HAVE THESE PROBLEMS MADE IT FOR YOU TO DO YOUR WORK, TAKE CARE OF THINGS AT HOME, OR GET ALONG WITH OTHER PEOPLE: NOT DIFFICULT AT ALL
2. NOT BEING ABLE TO STOP OR CONTROL WORRYING: NOT AT ALL

## 2021-12-09 ASSESSMENT — PATIENT HEALTH QUESTIONNAIRE - PHQ9: 5. POOR APPETITE OR OVEREATING: NOT AT ALL

## 2021-12-09 ASSESSMENT — PAIN SCALES - GENERAL: PAINLEVEL: NO PAIN (0)

## 2021-12-09 NOTE — PATIENT INSTRUCTIONS
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

## 2021-12-09 NOTE — PROGRESS NOTES
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    Weight as of this encounter: 65.3 kg (144 lb).           Return in about 6 months (around 6/9/2022) for BP Recheck, depression/anxiety recheck.    Ramona Ann Aaseby-Aguilera, PA-C  [facility] SVEN Henson is a 81 year old who presents for the following health issues     HPI     Hyperlipidemia Follow-Up      Are you regularly taking any medication or supplement to lower your cholesterol?   Yes- lipitor    Are you having muscle aches or other side effects that you think could be caused by your cholesterol lowering medication?  No    Hypertension Follow-up      Do you check your blood pressure regularly outside of the clinic? No     Are you following a low salt diet? Yes    Are your blood pressures ever more than 140 on the top number (systolic) OR more   than 90 on the bottom number (diastolic), for example 140/90? n/a    Anxiety Follow-Up    How are you doing with your anxiety since your last visit? No change    Are you having other symptoms that might be associated with anxiety? No    Have you had a significant life event? No     Are you feeling depressed? No    Do you have any concerns with your use of alcohol or other drugs? No    Social History     Tobacco Use "     Smoking status: Former Smoker     Types: Cigarettes     Smokeless tobacco: Never Used     Tobacco comment: quit 1993   Vaping Use     Vaping Use: Never used   Substance Use Topics     Alcohol use: Yes     Alcohol/week: 3.0 standard drinks     Types: 3 Glasses of wine per week     Comment: a couple times a week     Drug use: No     MADISON-7 SCORE 7/2/2018 11/14/2019 12/9/2021   Total Score - - -   Total Score 0 1 1     PHQ 11/14/2019 5/13/2020 12/9/2021   PHQ-9 Total Score 1 1 1   Q9: Thoughts of better off dead/self-harm past 2 weeks Not at all Not at all Not at all           How many servings of fruits and vegetables do you eat daily?  2-3    On average, how many sweetened beverages do you drink each day (Examples: soda, juice, sweet tea, etc.  Do NOT count diet or artificially sweetened beverages)?   0    How many days per week do you exercise enough to make your heart beat faster? none    How many minutes a day do you exercise enough to make your heart beat faster? None     How many days per week do you miss taking your medication? 0,Patient is concerned about taking the aspirin.        Review of Systems   Constitutional, HEENT, cardiovascular, pulmonary, gi and gu systems are negative, except as otherwise noted.      Objective    /60   Pulse 80   Temp 98  F (36.7  C) (Oral)   Resp 20   Wt 65.3 kg (144 lb)   BMI 23.96 kg/m    Body mass index is 23.96 kg/m .  Physical Exam   GENERAL: healthy, alert and no distress  EYES: Eyes grossly normal to inspection, PERRL and conjunctivae and sclerae normal  HENT: ear canals and TM's normal, nose and mouth without ulcers or lesions  NECK: no adenopathy, no asymmetry, masses, or scars and thyroid normal to palpation  RESP: lungs clear to auscultation - no rales, rhonchi or wheezes  CV: regular rate and rhythm, normal S1 S2, no S3 or S4, no murmur, click or rub, no peripheral edema and peripheral pulses strong  MS: no gross musculoskeletal defects noted, no  edema  SKIN: no suspicious lesions or rashes  NEURO: Normal strength and tone, mentation intact and speech normal  PSYCH: mentation appears normal, affect normal/bright

## 2021-12-10 ASSESSMENT — ANXIETY QUESTIONNAIRES: GAD7 TOTAL SCORE: 1

## 2021-12-10 ASSESSMENT — PATIENT HEALTH QUESTIONNAIRE - PHQ9: SUM OF ALL RESPONSES TO PHQ QUESTIONS 1-9: 1

## 2022-04-19 DIAGNOSIS — I65.21 CAROTID ARTERY STENOSIS, ASYMPTOMATIC, RIGHT: ICD-10-CM

## 2022-04-20 RX ORDER — ATORVASTATIN CALCIUM 10 MG/1
5 TABLET, FILM COATED ORAL DAILY
Qty: 45 TABLET | Refills: 0 | Status: SHIPPED | OUTPATIENT
Start: 2022-04-20 | End: 2022-07-13

## 2022-07-12 DIAGNOSIS — I65.21 CAROTID ARTERY STENOSIS, ASYMPTOMATIC, RIGHT: ICD-10-CM

## 2022-07-13 RX ORDER — ATORVASTATIN CALCIUM 10 MG/1
TABLET, FILM COATED ORAL
Qty: 45 TABLET | Refills: 0 | Status: SHIPPED | OUTPATIENT
Start: 2022-07-13 | End: 2022-08-25

## 2022-07-13 NOTE — TELEPHONE ENCOUNTER
Routing refill request to provider for review/approval because:  Labs out of range:  LDL  Labs not current:  LDL    LDL Cholesterol Calculated   Date Value Ref Range Status   06/03/2020 104 (H) <100 mg/dL Final     Comment:     Above desirable:  100-129 mg/dl  Borderline High:  130-159 mg/dL  High:             160-189 mg/dL  Very high:       >189 mg/dl        Nancy LANDAVERDE RN

## 2022-07-21 DIAGNOSIS — I10 BENIGN ESSENTIAL HYPERTENSION: ICD-10-CM

## 2022-07-22 RX ORDER — AMLODIPINE BESYLATE 10 MG/1
10 TABLET ORAL DAILY
Qty: 90 TABLET | Refills: 0 | Status: SHIPPED | OUTPATIENT
Start: 2022-07-22 | End: 2022-08-25

## 2022-07-22 RX ORDER — LISINOPRIL 20 MG/1
20 TABLET ORAL DAILY
Qty: 90 TABLET | Refills: 0 | Status: SHIPPED | OUTPATIENT
Start: 2022-07-22 | End: 2022-08-25

## 2022-07-22 NOTE — TELEPHONE ENCOUNTER
Prescription approved per [facility] Refill Protocol.    Samanta Beard RN/Lily Rosaod RN

## 2022-08-09 DIAGNOSIS — F33.3 SEVERE RECURRENT MAJOR DEPRESSIVE DISORDER WITH PSYCHOTIC FEATURES (H): ICD-10-CM

## 2022-08-09 RX ORDER — GABAPENTIN 300 MG/1
CAPSULE ORAL
Qty: 270 CAPSULE | Refills: 0 | Status: SHIPPED | OUTPATIENT
Start: 2022-08-09 | End: 2022-08-25

## 2022-08-11 ENCOUNTER — ANCILLARY PROCEDURE (OUTPATIENT)
Dept: MAMMOGRAPHY | Facility: CLINIC | Age: 82
End: 2022-08-11
Attending: PHYSICIAN ASSISTANT
Payer: MEDICARE

## 2022-08-11 DIAGNOSIS — Z12.31 VISIT FOR SCREENING MAMMOGRAM: ICD-10-CM

## 2022-08-11 PROCEDURE — 77067 SCR MAMMO BI INCL CAD: CPT | Mod: TC | Performed by: RADIOLOGY

## 2022-08-25 ENCOUNTER — OFFICE VISIT (OUTPATIENT)
Dept: FAMILY MEDICINE | Facility: CLINIC | Age: 82
End: 2022-08-25
Payer: MEDICARE

## 2022-08-25 VITALS
HEART RATE: 86 BPM | TEMPERATURE: 98.4 F | HEIGHT: 60 IN | DIASTOLIC BLOOD PRESSURE: 62 MMHG | RESPIRATION RATE: 18 BRPM | WEIGHT: 146 LBS | BODY MASS INDEX: 28.66 KG/M2 | OXYGEN SATURATION: 96 % | SYSTOLIC BLOOD PRESSURE: 142 MMHG

## 2022-08-25 DIAGNOSIS — E03.8 OTHER SPECIFIED HYPOTHYROIDISM: ICD-10-CM

## 2022-08-25 DIAGNOSIS — F33.3 SEVERE RECURRENT MAJOR DEPRESSIVE DISORDER WITH PSYCHOTIC FEATURES (H): ICD-10-CM

## 2022-08-25 DIAGNOSIS — I10 BENIGN ESSENTIAL HYPERTENSION: ICD-10-CM

## 2022-08-25 DIAGNOSIS — F41.9 ANXIETY: ICD-10-CM

## 2022-08-25 DIAGNOSIS — I65.21 CAROTID ARTERY STENOSIS, ASYMPTOMATIC, RIGHT: ICD-10-CM

## 2022-08-25 DIAGNOSIS — Z00.00 ENCOUNTER FOR MEDICARE ANNUAL WELLNESS EXAM: Primary | ICD-10-CM

## 2022-08-25 LAB
ERYTHROCYTE [DISTWIDTH] IN BLOOD BY AUTOMATED COUNT: 13.8 % (ref 10–15)
HCT VFR BLD AUTO: 40.1 % (ref 35–47)
HGB BLD-MCNC: 13.3 G/DL (ref 11.7–15.7)
MCH RBC QN AUTO: 30.4 PG (ref 26.5–33)
MCHC RBC AUTO-ENTMCNC: 33.2 G/DL (ref 31.5–36.5)
MCV RBC AUTO: 92 FL (ref 78–100)
PLATELET # BLD AUTO: 288 10E3/UL (ref 150–450)
RBC # BLD AUTO: 4.38 10E6/UL (ref 3.8–5.2)
WBC # BLD AUTO: 7.2 10E3/UL (ref 4–11)

## 2022-08-25 PROCEDURE — 85027 COMPLETE CBC AUTOMATED: CPT | Performed by: PHYSICIAN ASSISTANT

## 2022-08-25 PROCEDURE — 80061 LIPID PANEL: CPT | Performed by: PHYSICIAN ASSISTANT

## 2022-08-25 PROCEDURE — 36415 COLL VENOUS BLD VENIPUNCTURE: CPT | Performed by: PHYSICIAN ASSISTANT

## 2022-08-25 PROCEDURE — G0439 PPPS, SUBSEQ VISIT: HCPCS | Performed by: PHYSICIAN ASSISTANT

## 2022-08-25 PROCEDURE — 80048 BASIC METABOLIC PNL TOTAL CA: CPT | Performed by: PHYSICIAN ASSISTANT

## 2022-08-25 RX ORDER — GABAPENTIN 300 MG/1
300 CAPSULE ORAL 3 TIMES DAILY
Qty: 270 CAPSULE | Refills: 3 | Status: SHIPPED | OUTPATIENT
Start: 2022-08-25 | End: 2023-09-05

## 2022-08-25 RX ORDER — SERTRALINE HYDROCHLORIDE 100 MG/1
100 TABLET, FILM COATED ORAL DAILY
Qty: 90 TABLET | Refills: 3 | Status: SHIPPED | OUTPATIENT
Start: 2022-08-25 | End: 2023-09-05

## 2022-08-25 RX ORDER — ATORVASTATIN CALCIUM 10 MG/1
TABLET, FILM COATED ORAL
Qty: 45 TABLET | Refills: 3 | Status: SHIPPED | OUTPATIENT
Start: 2022-08-25 | End: 2023-09-05

## 2022-08-25 RX ORDER — AMLODIPINE BESYLATE 10 MG/1
10 TABLET ORAL DAILY
Qty: 90 TABLET | Refills: 1 | Status: SHIPPED | OUTPATIENT
Start: 2022-08-25 | End: 2023-05-09

## 2022-08-25 RX ORDER — RISPERIDONE 0.5 MG/1
0.5 TABLET ORAL AT BEDTIME
Qty: 90 TABLET | Refills: 3 | Status: SHIPPED | OUTPATIENT
Start: 2022-08-25 | End: 2023-09-05

## 2022-08-25 RX ORDER — LISINOPRIL 20 MG/1
20 TABLET ORAL DAILY
Qty: 90 TABLET | Refills: 3 | Status: SHIPPED | OUTPATIENT
Start: 2022-08-25 | End: 2023-09-05

## 2022-08-25 ASSESSMENT — ENCOUNTER SYMPTOMS
COUGH: 0
DIARRHEA: 1
MYALGIAS: 0
DYSURIA: 0
CHILLS: 0
PARESTHESIAS: 0
FREQUENCY: 1
EYE PAIN: 1
HEMATOCHEZIA: 0
SHORTNESS OF BREATH: 0
DIZZINESS: 0
HEARTBURN: 1
PALPITATIONS: 0
BREAST MASS: 0
CONSTIPATION: 0
HEADACHES: 0
SORE THROAT: 0
HEMATURIA: 0
JOINT SWELLING: 0
NERVOUS/ANXIOUS: 0
FEVER: 0
ABDOMINAL PAIN: 0
WEAKNESS: 1
ARTHRALGIAS: 1
NAUSEA: 0

## 2022-08-25 ASSESSMENT — ACTIVITIES OF DAILY LIVING (ADL): CURRENT_FUNCTION: TRANSPORTATION REQUIRES ASSISTANCE

## 2022-08-25 ASSESSMENT — PATIENT HEALTH QUESTIONNAIRE - PHQ9
SUM OF ALL RESPONSES TO PHQ QUESTIONS 1-9: 3
SUM OF ALL RESPONSES TO PHQ QUESTIONS 1-9: 3
10. IF YOU CHECKED OFF ANY PROBLEMS, HOW DIFFICULT HAVE THESE PROBLEMS MADE IT FOR YOU TO DO YOUR WORK, TAKE CARE OF THINGS AT HOME, OR GET ALONG WITH OTHER PEOPLE: NOT DIFFICULT AT ALL

## 2022-08-25 NOTE — PROGRESS NOTES
"SUBJECTIVE:   Natividad Mcginnis is a 81 year old female who presents for Preventive Visit.      Patient has been advised of split billing requirements and indicates understanding: Yes  Are you in the first 12 months of your Medicare coverage?  No    Healthy Habits:     In general, how would you rate your overall health?  Good    Frequency of exercise:  None    Do you usually eat at least 4 servings of fruit and vegetables a day, include whole grains    & fiber and avoid regularly eating high fat or \"junk\" foods?  Yes    Taking medications regularly:  Yes    Medication side effects:  None    Ability to successfully perform activities of daily living:  Transportation requires assistance    Home Safety:  No safety concerns identified    Hearing Impairment:  Difficulty following a conversation in a noisy restaurant or crowded room, find that men's voices are easier to understand than woman's, difficulty understanding soft or whispered speech and difficulty understanding speech on the telephone    In the past 6 months, have you been bothered by leaking of urine?  No    In general, how would you rate your overall mental or emotional health?  Good      PHQ-2 Total Score: 0    Additional concerns today:  No    Do you feel safe in your environment? Yes    Have you ever done Advance Care Planning? (For example, a Health Directive, POLST, or a discussion with a medical provider or your loved ones about your wishes): Yes, advance care planning is on file.       Fall risk  Fallen 2 or more times in the past year?: No  Any fall with injury in the past year?: No    Cognitive Screening   1) Repeat 3 items (Leader, Season, Table)    2) Clock draw: NORMAL  3) 3 item recall: Recalls 2 objects   Results: NORMAL clock, 1-2 items recalled: COGNITIVE IMPAIRMENT LESS LIKELY    Mini-CogTM Copyright MAGGI Mancia. Licensed by the author for use in [facility]; reprinted with permission ([email or web address]). All rights reserved.      Do you " have sleep apnea, excessive snoring or daytime drowsiness?: no    Reviewed and updated as needed this visit by clinical staff   Tobacco  Allergies    Med Hx  Surg Hx  Fam Hx  Soc Hx          Reviewed and updated as needed this visit by Provider                   Social History     Tobacco Use     Smoking status: Former Smoker     Types: Cigarettes     Smokeless tobacco: Never Used     Tobacco comment: quit 1993   Substance Use Topics     Alcohol use: Yes     Alcohol/week: 3.0 standard drinks     Types: 3 Glasses of wine per week     Comment: a couple times a week         Alcohol Use 8/25/2022   Prescreen: >3 drinks/day or >7 drinks/week? No   Prescreen: >3 drinks/day or >7 drinks/week? -               Current providers sharing in care for this patient include:   Patient Care Team:  Aaseby-Aguilera, Ramona Ann, PA-C as PCP - General (Physician Assistant)  Maida Corrales DO (OB/Gyn)  Aaseby-Aguilera, Ramona Ann, PA-C as Assigned PCP  Shaka Vasquez as Personal Advocate & Liaison (PAL)  Maida Corrales DO as Assigned OBGYN Provider    The following health maintenance items are reviewed in Epic and correct as of today:  Health Maintenance Due   Topic Date Due     ZOSTER IMMUNIZATION (1 of 2) Never done     LUNG CANCER SCREENING  Never done     TSH W/FREE T4 REFLEX  02/25/2022     BMP  07/01/2022     ANNUAL REVIEW OF HM ORDERS  07/01/2022     INFLUENZA VACCINE (1) 09/01/2022           Pertinent mammograms are reviewed under the imaging tab.    Review of Systems   Constitutional: Negative for chills and fever.   HENT: Positive for hearing loss. Negative for congestion, ear pain and sore throat.    Eyes: Positive for pain and visual disturbance.   Respiratory: Negative for cough and shortness of breath.    Cardiovascular: Negative for chest pain, palpitations and peripheral edema.   Gastrointestinal: Positive for diarrhea and heartburn. Negative for abdominal pain, constipation, hematochezia and nausea.    Breasts:  Negative for tenderness, breast mass and discharge.   Genitourinary: Positive for frequency. Negative for dysuria, genital sores, hematuria, pelvic pain, urgency, vaginal bleeding and vaginal discharge.   Musculoskeletal: Positive for arthralgias. Negative for joint swelling and myalgias.   Skin: Negative for rash.   Neurological: Positive for weakness. Negative for dizziness, headaches and paresthesias.   Psychiatric/Behavioral: Negative for mood changes. The patient is not nervous/anxious.          OBJECTIVE:   BP (!) 142/62   Pulse 86   Temp 98.4  F (36.9  C) (Oral)   Resp 18   Ht 1.524 m (5')   Wt 66.2 kg (146 lb)   SpO2 96%   BMI 28.51 kg/m   Estimated body mass index is 28.51 kg/m  as calculated from the following:    Height as of this encounter: 1.524 m (5').    Weight as of this encounter: 66.2 kg (146 lb).   JLW    Physical Exam  GENERAL APPEARANCE: healthy, alert and no distress  EYES: Eyes grossly normal to inspection, PERRL and conjunctivae and sclerae normal  HENT: ear canals and TM's normal, nose and mouth without ulcers or lesions, oropharynx clear and oral mucous membranes moist  NECK: no adenopathy, no asymmetry, masses, or scars and thyroid normal to palpation  RESP: lungs clear to auscultation - no rales, rhonchi or wheezes  BREAST: normal without masses, tenderness or nipple discharge and no palpable axillary masses or adenopathy  CV: regular rate and rhythm, normal S1 S2, no S3 or S4, no murmur, click or rub, no peripheral edema and peripheral pulses strong  ABDOMEN: soft, nontender, no hepatosplenomegaly, no masses and bowel sounds normal  MS: no musculoskeletal defects are noted and gait is age appropriate without ataxia  SKIN: no suspicious lesions or rashes  NEURO: Normal strength and tone, sensory exam grossly normal, mentation intact and speech normal  PSYCH: mentation appears normal and affect normal/bright    Diagnostic Test Results:  Labs reviewed in  Epic    ASSESSMENT / PLAN:   (Z00.00) Encounter for Medicare annual wellness exam  (primary encounter diagnosis)  Comment:   Plan:         (I10) Benign essential hypertension  Comment: stable   Plan: BASIC METABOLIC PANEL, CBC with platelets,         Lipid Profile (Chol, Trig, HDL, LDL calc),         amLODIPine (NORVASC) 10 MG tablet, lisinopril         (ZESTRIL) 20 MG tablet            (I65.21) Carotid artery stenosis, asymptomatic, right  Comment: stable   Plan: aspirin (ASA) 81 MG EC tablet, atorvastatin         (LIPITOR) 10 MG tablet            (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)  Comment: stable   Plan: gabapentin (NEURONTIN) 300 MG capsule,         risperiDONE (RISPERDAL) 0.5 MG tablet,         sertraline (ZOLOFT) 100 MG tablet            (F41.9) Anxiety  Comment: stable   Plan: sertraline (ZOLOFT) 100 MG tablet                  COUNSELING:  Reviewed preventive health counseling, as reflected in patient instructions       Regular exercise       Healthy diet/nutrition       Vision screening       Hearing screening       Dental care       Bladder control       Fall risk prevention    Estimated body mass index is 28.51 kg/m  as calculated from the following:    Height as of this encounter: 1.524 m (5').    Weight as of this encounter: 66.2 kg (146 lb).        She reports that she has quit smoking. Her smoking use included cigarettes. She has never used smokeless tobacco.      Appropriate preventive services were discussed with this patient, including applicable screening as appropriate for cardiovascular disease, diabetes, osteopenia/osteoporosis, and glaucoma.  As appropriate for age/gender, discussed screening for colorectal cancer, prostate cancer, breast cancer, and cervical cancer. Checklist reviewing preventive services available has been given to the patient.    Reviewed patients plan of care and provided an AVS. The Basic Care Plan (routine screening as  documented in Health Maintenance) for Natividad meets the Care Plan requirement. This Care Plan has been established and reviewed with the Patient.    Counseling Resources:  ATP IV Guidelines  Pooled Cohorts Equation Calculator  Breast Cancer Risk Calculator  Breast Cancer: Medication to Reduce Risk  FRAX Risk Assessment  ICSI Preventive Guidelines  Dietary Guidelines for Americans, 2010  USDA's MyPlate  ASA Prophylaxis  Lung CA Screening    Ramona Ann Aaseby-Aguilera, PA-C [facility]    Identified Health Risks:

## 2022-08-25 NOTE — LETTER
August 31, 2022      Natividad Mcginnis  [address] [place] [address]        Dear ,    We are writing to inform you of your test results.    Dear Natividad,     It was a pleasure to see you at your recent visit.       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         The rest of your labs are within acceptable limits :     Results for orders placed or performed in visit on 08/25/22   -BASIC METABOLIC PANEL:      Status: Normal        Result                                            Value                         Ref Range                        Sodium                                            137                           133 - 144 mmol/L                Potassium                                         4.7                           3.4 - 5.3 mmol/L                Chloride                                          106                           94 - 109 mmol/L                  Carbon Dioxide (CO2)                              25                            20 - 32 mmol/L                  Anion Gap                                         6                             3 - 14 mmol/L                    Urea Nitrogen                                     11                            7 - 30 mg/dL                    Creatinine                                        0.58                          0.52 - 1.04 mg/dL                Calcium                                           9.9                           8.5 - 10.1 mg/dL                Glucose                                           93                             70 - 99 mg/dL                    GFR Estimate                                      >90                           >60 mL/min/1.73m2           -CBC with platelets:      Status: Normal        Result                                            Value                         Ref Range                        WBC Count                                         7.2                           4.0 - 11.0 10e3/uL              RBC Count                                         4.38                          3.80 - 5.20 10e6/uL              Hemoglobin                                        13.3                          11.7 - 15.7 g/dL                Hematocrit                                        40.1                          35.0 - 47.0 %                    MCV                                               92                            78 - 100 fL                      MCH                                               30.4                          26.5 - 33.0 pg                  MCHC                                              33.2                          31.5 - 36.5 g/dL                RDW                                               13.8                          10.0 - 15.0 %                    Platelet Count                                    288                           150 - 450 10e3/uL           -Lipid Profile (Chol, Trig, HDL, LDL calc):      Status: Abnormal        Result                                            Value                         Ref Range                        Cholesterol                                       182                           <200 mg/dL                      Triglycerides                                     172 (H)                       <150 mg/dL                      Direct Measure HDL                                93                            >=50 mg/dL                      LDL Cholesterol Calculated                        55                            <=100 mg/dL                       Non HDL Cholesterol                               89                            <130 mg/dL                      Patient Fasting > 8hrs?                           Yes                                                                                                                                                                        Narrative   Cholesterol   Desirable:  <200 mg/dL     Triglycerides   Normal:  Less than 150 mg/dL   Borderline High:  150-199 mg/dL   High:  200-499 mg/dL   Very High:  Greater than or equal to 500 mg/dL     Direct Measure HDL   Female:  Greater than or equal to 50 mg/dL   Male:  Greater than or equal to 40 mg/dL     LDL Cholesterol   Desirable:  <100mg/dL   Above Desirable:  100-129 mg/dL   Borderline High:  130-159 mg/dL   High:  160-189 mg/dL   Very High:  >= 190 mg/dL     Non HDL Cholesterol   Desirable:  130 mg/dL   Above Desirable:  130-159 mg/dL   Borderline High:  160-189 mg/dL   High:  190-219 mg/dL   Very High:  Greater than or equal to 220 mg/dL       Thank you for choosing [facility]. We appreciate the opportunity to serve   you and look forward to supporting your healthcare needs in the future.     If you have any questions or concerns, please contact us at [phone]       Sincerely,         Ramona Aaseby-Aguilera PA-C           TEST DESCRIPTIONS   CBC (Complete Blood Coun   t) includes hemoglobin, hematocrit, white blood cells, etc. This test can be used to detect anemia, infection, and abnormalities in blood cells.   Cholesterol is one of the blood fats (lipids) and is the building block used by the body for cell wall and   hormone production. Increased levels of cholesterol have been proven to directly contribute to heart disease and strokes.   Triglycerides are one of the blood fats (lipids) and are thought to be associated with heart disease. If you have had anything to   eat or drink other than water for 12 hours before the test and your level is  "high, you should have the test repeated after a 12 hour fast. Abnormally high results may also be associated with diabetes, kidney and liver diseases.   LDL is the low density l   ipoprotein component of the cholesterol. It is the harmful substance that deposits cholesterol on the artery walls contributing to heart attacks and strokes. A high LDL level is associated with higher risk of coronary heart disease.   HDL stands for high    density lipoprotein and refers to the so-called \"good\" cholesterol. HDL picks up excess cholesterol in the bloodstream and carries it back to the liver for disposal. Individuals with higher than average HDL seem to have a lower risk of coronary disease.    Vigorous exercise will help increase the blood levels of HDL.   Risk Factor (Total cholesterol/HDL) is a commonly used ratio for cardiac risk assessment. Less than 5.0 for men and 4.4 for women is ideal.   Sodium is an electrolyte useful in diagnosis of    dehydration, diabetes, hypertension, or other diseases involving electrolyte imbalance. It also preserves the balance between calcium and potassium to maintain normal heart action and equilibrium of the body.   Potassium is also an electrolyte that work   s with sodium to regulate the body's water balance and normalize heart rhythm.   Glucose is a measure of blood sugar and is one of the tests for diabetes. If you have not been fasting, your level will often be high. A low glucose level may be a cause of   weakness or dizziness. Blood sugar ranks with cholesterol as a causative factor in arteriosclerosis and heart attacks.   BUN & Creatinine are waste products excreted by the kidneys. A high BUN can be related to a high protein diet, heavy exercise, fever   and infections, dehydration, kidney stones, and kidney disease. Creatinine elevation is less dependent on diet or exercise and better represents kidney impairment. Low values are not generally significant.   Calcium is a mineral " in the blood controlled b   y the parathyroid glands and kidneys. It is important in the formation of bone, in muscle and nerve function, and in blood clotting. Disease of the parathyroid gland, diseased bones or kidneys, or defective absorption of calcium may cause abnormal levels    from the intestine.   ALT, AST, Alk Phos are liver tests. Enzymes found in the liver as well as skeletal and cardiac muscle. Elevations can often be seen in alcoholism, liver or heart disease. Slightly abnormal values are not considered significant.   T   SH stands for Thyroid Stimulating Hormone. A sensitive test used to determine how the thyroid gland is functioning. The thyroid gland produces hormones that control the body's metabolism. When too few hormones are produced (increased TSH), hypothyroidism    occurs which can cause fatigue, sensitivity to cold, and weight gain - an overall slowing down of bodily functions. When too many thyroid hormones are produces (or decreased TSH), it creates a condition call hyperthyroidism (such as Graves' disease) whi   ch causes rapid heartbeat, weight loss, and dizziness, among other symptoms.   PSA stands for Prostate Specific Antigen. Elevated levels of PSA can increase with trauma, infection, inflammation, or disease processes in the prostate such as BPH (Benigh Pr   ostatic Hypertrophy) or cancer.   Glycohemoglobin or HgBA1C is a 3-month average of blood sugar.      Resulted Orders   BASIC METABOLIC PANEL   Result Value Ref Range    Sodium 137 133 - 144 mmol/L    Potassium 4.7 3.4 - 5.3 mmol/L      Comment:      Specimen slightly hemolyzed, potassium may be falsely elevated.    Chloride 106 94 - 109 mmol/L    Carbon Dioxide (CO2) 25 20 - 32 mmol/L    Anion Gap 6 3 - 14 mmol/L    Urea Nitrogen 11 7 - 30 mg/dL    Creatinine 0.58 0.52 - 1.04 mg/dL    Calcium 9.9 8.5 - 10.1 mg/dL    Glucose 93 70 - 99 mg/dL    GFR Estimate >90 >60 mL/min/1.73m2      Comment:      Effective December 21, 2021  eGFRcr in adults is calculated using the 2021 CKD-EPI creatinine equation which includes age and gender (Kenna et al., [place], DOI: 10.1056/AVTOna2786694)   CBC with platelets   Result Value Ref Range    WBC Count 7.2 4.0 - 11.0 10e3/uL    RBC Count 4.38 3.80 - 5.20 10e6/uL    Hemoglobin 13.3 11.7 - 15.7 g/dL    Hematocrit 40.1 35.0 - 47.0 %    MCV 92 78 - 100 fL    MCH 30.4 26.5 - 33.0 pg    MCHC 33.2 31.5 - 36.5 g/dL    RDW 13.8 10.0 - 15.0 %    Platelet Count 288 150 - 450 10e3/uL   Lipid Profile (Chol, Trig, HDL, LDL calc)   Result Value Ref Range    Cholesterol 182 <200 mg/dL    Triglycerides 172 (H) <150 mg/dL    Direct Measure HDL 93 >=50 mg/dL    LDL Cholesterol Calculated 55 <=100 mg/dL    Non HDL Cholesterol 89 <130 mg/dL    Patient Fasting > 8hrs? Yes     Narrative    Cholesterol  Desirable:  <200 mg/dL    Triglycerides  Normal:  Less than 150 mg/dL  Borderline High:  150-199 mg/dL  High:  200-499 mg/dL  Very High:  Greater than or equal to 500 mg/dL    Direct Measure HDL  Female:  Greater than or equal to 50 mg/dL   Male:  Greater than or equal to 40 mg/dL    LDL Cholesterol  Desirable:  <100mg/dL  Above Desirable:  100-129 mg/dL   Borderline High:  130-159 mg/dL   High:  160-189 mg/dL   Very High:  >= 190 mg/dL    Non HDL Cholesterol  Desirable:  130 mg/dL  Above Desirable:  130-159 mg/dL  Borderline High:  160-189 mg/dL  High:  190-219 mg/dL  Very High:  Greater than or equal to 220 mg/dL       If you have any questions or concerns, please call the clinic at the number listed above.       Sincerely,      Ramona Ann Aaseby-Aguilera, PA-C

## 2022-08-25 NOTE — PATIENT INSTRUCTIONS
Patient Education   Personalized Prevention Plan  You are due for the preventive services outlined below.  Your care team is available to assist you in scheduling these services.  If you have already completed any of these items, please share that information with your care team to update in your medical record.  Health Maintenance Due   Topic Date Due     Zoster (Shingles) Vaccine (1 of 2) Never done     LUNG CANCER SCREENING  Never done     Thyroid Function Lab  02/25/2022     Basic Metabolic Panel  07/01/2022     ANNUAL REVIEW OF HM ORDERS  07/01/2022     Annual Wellness Visit  08/02/2022     Flu Vaccine (1) 09/01/2022

## 2022-08-26 LAB
ANION GAP SERPL CALCULATED.3IONS-SCNC: 6 MMOL/L (ref 3–14)
BUN SERPL-MCNC: 11 MG/DL (ref 7–30)
CALCIUM SERPL-MCNC: 9.9 MG/DL (ref 8.5–10.1)
CHLORIDE BLD-SCNC: 106 MMOL/L (ref 94–109)
CHOLEST SERPL-MCNC: 182 MG/DL
CO2 SERPL-SCNC: 25 MMOL/L (ref 20–32)
CREAT SERPL-MCNC: 0.58 MG/DL (ref 0.52–1.04)
FASTING STATUS PATIENT QL REPORTED: YES
GFR SERPL CREATININE-BSD FRML MDRD: >90 ML/MIN/1.73M2
GLUCOSE BLD-MCNC: 93 MG/DL (ref 70–99)
HDLC SERPL-MCNC: 93 MG/DL
LDLC SERPL CALC-MCNC: 55 MG/DL
NONHDLC SERPL-MCNC: 89 MG/DL
POTASSIUM BLD-SCNC: 4.7 MMOL/L (ref 3.4–5.3)
SODIUM SERPL-SCNC: 137 MMOL/L (ref 133–144)
TRIGL SERPL-MCNC: 172 MG/DL

## 2022-08-30 NOTE — RESULT ENCOUNTER NOTE
Dear Natividad,    It was a pleasure to see you at your recent visit.      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        The rest of your labs are within acceptable limits :     Results for orders placed or performed in visit on 08/25/22  -BASIC METABOLIC PANEL:      Status: Normal       Result                                            Value                         Ref Range                       Sodium                                            137                           133 - 144 mmol/L                Potassium                                         4.7                           3.4 - 5.3 mmol/L                Chloride                                          106                           94 - 109 mmol/L                 Carbon Dioxide (CO2)                              25                            20 - 32 mmol/L                  Anion Gap                                         6                             3 - 14 mmol/L                   Urea Nitrogen                                     11                            7 - 30 mg/dL                    Creatinine                                        0.58                          0.52 - 1.04 mg/dL               Calcium                                           9.9                           8.5 - 10.1 mg/dL                Glucose                                           93                            70 - 99 mg/dL                   GFR Estimate                                      >90                           >60 mL/min/1.73m2          -CBC with platelets:       Status: Normal       Result                                            Value                         Ref Range                       WBC Count                                         7.2                           4.0 - 11.0 10e3/uL              RBC Count                                         4.38                          3.80 - 5.20 10e6/uL             Hemoglobin                                        13.3                          11.7 - 15.7 g/dL                Hematocrit                                        40.1                          35.0 - 47.0 %                   MCV                                               92                            78 - 100 fL                     MCH                                               30.4                          26.5 - 33.0 pg                  MCHC                                              33.2                          31.5 - 36.5 g/dL                RDW                                               13.8                          10.0 - 15.0 %                   Platelet Count                                    288                           150 - 450 10e3/uL          -Lipid Profile (Chol, Trig, HDL, LDL calc):      Status: Abnormal       Result                                            Value                         Ref Range                       Cholesterol                                       182                           <200 mg/dL                      Triglycerides                                     172 (H)                       <150 mg/dL                      Direct Measure HDL                                93                            >=50 mg/dL                      LDL Cholesterol Calculated                        55                            <=100 mg/dL                     Non HDL Cholesterol                               89                            <130 mg/dL                      Patient Fasting > 8hrs?                           Yes                                                           Narrative    Cholesterol    Desirable:  <200 mg/dL        Triglycerides    Normal:  Less than 150 mg/dL    Borderline High:  150-199 mg/dL    High:  200-499 mg/dL    Very High:  Greater than or equal to 500 mg/dL        Direct Measure HDL    Female:  Greater than or equal to 50 mg/dL     Male:  Greater than or equal to 40 mg/dL        LDL Cholesterol    Desirable:  <100mg/dL    Above Desirable:  100-129 mg/dL     Borderline High:  130-159 mg/dL     High:  160-189 mg/dL     Very High:  >= 190 mg/dL        Non HDL Cholesterol    Desirable:  130 mg/dL    Above Desirable:  130-159 mg/dL    Borderline High:  160-189 mg/dL    High:  190-219 mg/dL    Very High:  Greater than or equal to 220 mg/dL      Thank you for choosing [facility]. We appreciate the opportunity to serve   you and look forward to supporting your healthcare needs in the future.    If you have any questions or concerns, please contact us at [phone]      Sincerely,        Ramona Aaseby-Aguilera PA-C          TEST DESCRIPTIONS   CBC (Complete Blood Coun  t) includes hemoglobin, hematocrit, white blood cells, etc. This test can be used to detect anemia, infection, and abnormalities in blood cells.   Cholesterol is one of the blood fats (lipids) and is the building block used by the body for cell wall and   hormone production. Increased levels of cholesterol have been proven to directly contribute to heart disease and strokes.   Triglycerides are one of the blood fats (lipids) and are thought to be associated with heart disease. If you have had anything to   eat or drink other than water for 12 hours before the test and your level is high, you should have the test repeated after a 12 hour fast. Abnormally high results may also be associated with diabetes, kidney and liver diseases.   LDL is the low density l  ipoprotein component of the cholesterol. It is the harmful substance that deposits  "cholesterol on the artery walls contributing to heart attacks and strokes. A high LDL level is associated with higher risk of coronary heart disease.   HDL stands for high   density lipoprotein and refers to the so-called \"good\" cholesterol. HDL picks up excess cholesterol in the bloodstream and carries it back to the liver for disposal. Individuals with higher than average HDL seem to have a lower risk of coronary disease.   Vigorous exercise will help increase the blood levels of HDL.   Risk Factor (Total cholesterol/HDL) is a commonly used ratio for cardiac risk assessment. Less than 5.0 for men and 4.4 for women is ideal.   Sodium is an electrolyte useful in diagnosis of   dehydration, diabetes, hypertension, or other diseases involving electrolyte imbalance. It also preserves the balance between calcium and potassium to maintain normal heart action and equilibrium of the body.   Potassium is also an electrolyte that work  s with sodium to regulate the body's water balance and normalize heart rhythm.   Glucose is a measure of blood sugar and is one of the tests for diabetes. If you have not been fasting, your level will often be high. A low glucose level may be a cause of   weakness or dizziness. Blood sugar ranks with cholesterol as a causative factor in arteriosclerosis and heart attacks.   BUN & Creatinine are waste products excreted by the kidneys. A high BUN can be related to a high protein diet, heavy exercise, fever   and infections, dehydration, kidney stones, and kidney disease. Creatinine elevation is less dependent on diet or exercise and better represents kidney impairment. Low values are not generally significant.   Calcium is a mineral in the blood controlled b  y the parathyroid glands and kidneys. It is important in the formation of bone, in muscle and nerve function, and in blood clotting. Disease of the parathyroid gland, diseased bones or kidneys, or defective absorption of calcium may cause " abnormal levels   from the intestine.   ALT, AST, Alk Phos are liver tests. Enzymes found in the liver as well as skeletal and cardiac muscle. Elevations can often be seen in alcoholism, liver or heart disease. Slightly abnormal values are not considered significant.   T  SH stands for Thyroid Stimulating Hormone. A sensitive test used to determine how the thyroid gland is functioning. The thyroid gland produces hormones that control the body's metabolism. When too few hormones are produced (increased TSH), hypothyroidism   occurs which can cause fatigue, sensitivity to cold, and weight gain - an overall slowing down of bodily functions. When too many thyroid hormones are produces (or decreased TSH), it creates a condition call hyperthyroidism (such as Graves' disease) whi  ch causes rapid heartbeat, weight loss, and dizziness, among other symptoms.   PSA stands for Prostate Specific Antigen. Elevated levels of PSA can increase with trauma, infection, inflammation, or disease processes in the prostate such as BPH (Benigh Pr  ostatic Hypertrophy) or cancer.   Glycohemoglobin or HgBA1C is a 3-month average of blood sugar.

## 2022-09-13 DIAGNOSIS — L90.0 LICHEN SCLEROSUS: ICD-10-CM

## 2022-09-13 RX ORDER — ALCLOMETASONE DIPROPIONATE 0.5 MG/G
OINTMENT TOPICAL DAILY
Qty: 60 G | Refills: 0 | Status: SHIPPED | OUTPATIENT
Start: 2022-09-13 | End: 2022-11-28

## 2022-09-13 NOTE — TELEPHONE ENCOUNTER
Medication is being filled for 1 time refill only due to:  Patient needs to be seen because it has been more than one year since last visit.     Natali GÓMEZ RN

## 2022-09-16 ENCOUNTER — IMMUNIZATION (OUTPATIENT)
Dept: FAMILY MEDICINE | Facility: CLINIC | Age: 82
End: 2022-09-16
Payer: MEDICARE

## 2022-09-16 PROCEDURE — 0134A COVID-19,PF,MODERNA BIVALENT: CPT

## 2022-09-16 PROCEDURE — 90662 IIV NO PRSV INCREASED AG IM: CPT

## 2022-09-16 PROCEDURE — 91313 COVID-19,PF,MODERNA BIVALENT: CPT

## 2022-09-16 PROCEDURE — G0008 ADMIN INFLUENZA VIRUS VAC: HCPCS

## 2022-11-28 ENCOUNTER — OFFICE VISIT (OUTPATIENT)
Dept: OBGYN | Facility: CLINIC | Age: 82
End: 2022-11-28
Payer: MEDICARE

## 2022-11-28 VITALS
SYSTOLIC BLOOD PRESSURE: 140 MMHG | WEIGHT: 141.6 LBS | DIASTOLIC BLOOD PRESSURE: 80 MMHG | BODY MASS INDEX: 27.8 KG/M2 | HEIGHT: 60 IN

## 2022-11-28 DIAGNOSIS — L90.0 LICHEN SCLEROSUS: ICD-10-CM

## 2022-11-28 PROCEDURE — 99397 PER PM REEVAL EST PAT 65+ YR: CPT | Performed by: FAMILY MEDICINE

## 2022-11-28 RX ORDER — ALCLOMETASONE DIPROPIONATE 0.5 MG/G
OINTMENT TOPICAL DAILY
Qty: 60 G | Refills: 11 | Status: SHIPPED | OUTPATIENT
Start: 2022-11-28 | End: 2022-11-28

## 2022-11-28 RX ORDER — ALCLOMETASONE DIPROPIONATE 0.5 MG/G
OINTMENT TOPICAL DAILY
Qty: 120 G | Refills: 11 | Status: SHIPPED | OUTPATIENT
Start: 2022-11-28 | End: 2024-02-21

## 2022-11-28 NOTE — NURSING NOTE
Chief Complaint   Patient presents with     Gyn Exam       Initial BP (!) 140/80   Ht 1.524 m (5')   Wt 64.2 kg (141 lb 9.6 oz)   BMI 27.65 kg/m   Estimated body mass index is 27.65 kg/m  as calculated from the following:    Height as of this encounter: 1.524 m (5').    Weight as of this encounter: 64.2 kg (141 lb 9.6 oz).  BP completed using cuff size: regular    Questioned patient about current smoking habits.  Pt. quit smoking some time ago.          The following HM Due: NONE

## 2022-11-28 NOTE — PATIENT INSTRUCTIONS
Return yearly      Dr. Maida Corrales, DO    Obstetrics and Gynecology  [facility] [place]

## 2022-11-28 NOTE — CONFIDENTIAL NOTE
SUBJECTIVE:  Natividad Mcginnis is an 82 year old  postmenopausal woman   who presents for annual gyn exam. Menopause at age 50s. No   bleeding, spotting, or discharge noted. Concerns:  none    Estrogen replacement therapy: none currently, has taken in past  TED exposure: no  History of abnormal Pap smear: No  Family history of uterine or ovarian cancer: No  Regular self breast exam: Yes  History of abnormal mammogram: No  Family history of breast cancer: Yes: mother  History of abnormal lipids: No    Past Medical History:   Diagnosis Date     Anxiety      Depressive disorder      Thyroid disease           Family History   Problem Relation Age of Onset     Breast Cancer Mother         ? in her 40's     Cancer Mother 49        lung cancer     Cancer Father         lung cancer     Heart Disease Father         hardening of arteries     Colon Cancer Sister         1 -dxed at age of 75     Cancer - colorectal Sister      Heart Defect Son 52       Past Surgical History:   Procedure Laterality Date     3        APPENDECTOMY      as a child      COLONOSCOPY       COLONOSCOPY N/A 10/6/2014    Procedure: COLONOSCOPY;  Surgeon: Billie Araiza MD;  Location: [facility]     TUBAL LIGATION       US BREAST BIOPSY VACUUM LEFT      normal, whlie ago       Current Outpatient Medications   Medication     alclomethasone (ACLOVATE) 0.05 % ointment     amLODIPine (NORVASC) 10 MG tablet     aspirin (ASA) 81 MG EC tablet     atorvastatin (LIPITOR) 10 MG tablet     calcium carbonate 600 mg-vitamin D 400 units 600-400 MG-UNIT per tablet     gabapentin (NEURONTIN) 300 MG capsule     levothyroxine (SYNTHROID/LEVOTHROID) 75 MCG tablet     lisinopril (ZESTRIL) 20 MG tablet     multivitamin w/minerals (THERA-VIT-M) tablet     prednisoLONE acetate (PRED FORTE) 1 % ophthalmic suspension     risperiDONE (RISPERDAL) 0.5 MG tablet     sertraline (ZOLOFT) 100 MG tablet     valACYclovir (VALTREX) 1000 mg tablet     No current facility-administered  medications for this visit.     Allergies   Allergen Reactions     Hydrocortisone Valerate      reactioin     Latex      Bad reaction     Nickel Sulfate      reaction     No Clinical Screening - See Comments      Reaction to Jasamine absolute, Thiruam mix, Quaternium, gold, Amidoamine, Glutaraldehyde ----BAD REACTION     Shampoos [Dhs]      clairol shampoo     Tert-Butylhydroquinone      Bad reaction       Social History     Tobacco Use     Smoking status: Former     Types: Cigarettes     Smokeless tobacco: Never     Tobacco comments:     quit 1993   Substance Use Topics     Alcohol use: Yes     Alcohol/week: 3.0 standard drinks     Types: 3 Glasses of wine per week     Comment: a couple times a week       Review Of Systems  Ears/Nose/Throat: negative  Respiratory: No shortness of breath, dyspnea on exertion, cough, or hemoptysis  Cardiovascular: negative  Gastrointestinal: negative  Genitourinary: See HPI   Constitutional, HEENT, cardiovascular, pulmonary, GI, , musculoskeletal, neuro, skin, endocrine and psych systems are negative, except as otherwise noted.    OBJECTIVE:  BP (!) 140/80   Ht 1.524 m (5')   Wt 64.2 kg (141 lb 9.6 oz)   BMI 27.65 kg/m    General appearance: healthy, alert and no distress  Skin: Skin color, texture, turgor normal. No rashes or lesions.  Ears: negative  Nose/Sinuses: Nares normal. Septum midline. Mucosa normal. No drainage or sinus tenderness.  Oropharynx: Lips, mucosa, and tongue normal. Teeth and gums normal.  Neck: Neck supple. No adenopathy. Thyroid symmetric, normal size,, Carotids without bruits.  Lungs: negative, Percussion normal. Good diaphragmatic excursion. Lungs clear  Heart: negative, PMI normal. No lifts, heaves, or thrills. RRR. No murmurs, clicks gallops or rub  Breasts: Inspection negative. No nipple discharge or bleeding. No masses.  Abdomen: Abdomen soft, non-tender. BS normal. No masses, organomegaly  Pelvic: Pelvic:  Pelvic examination with no pap/ Gonorrhea  and Chlamydia   including  External genitalia normal   and vagina normal rugatted not atrophic  Examination of urethra  normal no masses, tenderness, scarring  bladder, no masses or tenderness  Cervix no lesions or discharge  Bimanual exam with   Uterus 5 weeks size, mid position, mobile,non-tenderness, normal no descent   Adnexa/parametria  Normal no masses       ASSESSMENT:  Natividad Mcginnis is an 82 year old  postmenopausal woman   who presents for annual gyn exam.   Concerns:   PLAN:  Dx:  1)  Pap smear not indicated, mammogram ordered, Colonoscopy screening complete   2)  Lipids at appropriate intervals per primary, up to date.   3)  Covid-19 concerns: covid infection and vaccination recommendations discussed.   4)  Menopause:  Stable   5)  Lichen sclerosis: stable, refill called in for alclomethazole,   May not be covered in .  Will do prior authorization as she has many allergies and has   Tried 6 other formulations with side effects due to allergy.       PE:  Reviewed health maintenance including diet, regular exercise,   estrogen replacement and periodic exams.    Dr. Maida Corrales, DO    Obstetrics and Gynecology  [facility] [place]

## 2023-03-22 NOTE — PROGRESS NOTES
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 Patient does not have additional questions or concerns.        Visit is not preventive.    Patient is established.    Patient reminded of date and time. Barriers addressed: none  Chief complaint confirmed.    Health Maintenance Due   Topic Date Due     ZOSTER IMMUNIZATION (1 of 2) Never done     TSH W/FREE T4 REFLEX  02/25/2022     PHQ-9  02/25/2023     Patient is due for:  none  No appointment needed.    [employer]  Patient is active on [employer].    Questionnaire Review   Offered information on completing questionnaires via [employer].    Call Summary  \"Thank you for your time today.  If anything comes up before your appointment, please feel free to contact us at [phone].\"    Meli Newton  [facility]    "

## 2023-03-23 ENCOUNTER — OFFICE VISIT (OUTPATIENT)
Dept: FAMILY MEDICINE | Facility: CLINIC | Age: 83
End: 2023-03-23
Payer: MEDICARE

## 2023-03-23 VITALS
DIASTOLIC BLOOD PRESSURE: 68 MMHG | HEART RATE: 80 BPM | RESPIRATION RATE: 18 BRPM | SYSTOLIC BLOOD PRESSURE: 136 MMHG | OXYGEN SATURATION: 97 % | WEIGHT: 146.6 LBS | HEIGHT: 60 IN | TEMPERATURE: 97.8 F | BODY MASS INDEX: 28.78 KG/M2

## 2023-03-23 DIAGNOSIS — I10 PRIMARY HYPERTENSION: ICD-10-CM

## 2023-03-23 DIAGNOSIS — F33.3 SEVERE RECURRENT MAJOR DEPRESSIVE DISORDER WITH PSYCHOTIC FEATURES (H): ICD-10-CM

## 2023-03-23 DIAGNOSIS — H17.9 CORNEAL SCAR, LEFT EYE: ICD-10-CM

## 2023-03-23 DIAGNOSIS — E03.8 OTHER SPECIFIED HYPOTHYROIDISM: ICD-10-CM

## 2023-03-23 DIAGNOSIS — Z01.818 PREOP GENERAL PHYSICAL EXAM: Primary | ICD-10-CM

## 2023-03-23 DIAGNOSIS — E78.5 HYPERLIPIDEMIA LDL GOAL <160: ICD-10-CM

## 2023-03-23 LAB
ERYTHROCYTE [DISTWIDTH] IN BLOOD BY AUTOMATED COUNT: 13.2 % (ref 10–15)
HCT VFR BLD AUTO: 40.6 % (ref 35–47)
HGB BLD-MCNC: 13.5 G/DL (ref 11.7–15.7)
MCH RBC QN AUTO: 30.9 PG (ref 26.5–33)
MCHC RBC AUTO-ENTMCNC: 33.3 G/DL (ref 31.5–36.5)
MCV RBC AUTO: 93 FL (ref 78–100)
PLATELET # BLD AUTO: 278 10E3/UL (ref 150–450)
RBC # BLD AUTO: 4.37 10E6/UL (ref 3.8–5.2)
WBC # BLD AUTO: 7.7 10E3/UL (ref 4–11)

## 2023-03-23 PROCEDURE — 80053 COMPREHEN METABOLIC PANEL: CPT | Performed by: PHYSICIAN ASSISTANT

## 2023-03-23 PROCEDURE — 99214 OFFICE O/P EST MOD 30 MIN: CPT | Performed by: PHYSICIAN ASSISTANT

## 2023-03-23 PROCEDURE — 85027 COMPLETE CBC AUTOMATED: CPT | Performed by: PHYSICIAN ASSISTANT

## 2023-03-23 PROCEDURE — 36415 COLL VENOUS BLD VENIPUNCTURE: CPT | Performed by: PHYSICIAN ASSISTANT

## 2023-03-23 SDOH — ECONOMIC STABILITY: FOOD INSECURITY: WITHIN THE PAST 12 MONTHS, YOU WORRIED THAT YOUR FOOD WOULD RUN OUT BEFORE YOU GOT MONEY TO BUY MORE.: NEVER TRUE

## 2023-03-23 SDOH — ECONOMIC STABILITY: INCOME INSECURITY: IN THE LAST 12 MONTHS, WAS THERE A TIME WHEN YOU WERE NOT ABLE TO PAY THE MORTGAGE OR RENT ON TIME?: NO

## 2023-03-23 SDOH — ECONOMIC STABILITY: FOOD INSECURITY: WITHIN THE PAST 12 MONTHS, THE FOOD YOU BOUGHT JUST DIDN'T LAST AND YOU DIDN'T HAVE MONEY TO GET MORE.: NEVER TRUE

## 2023-03-23 SDOH — ECONOMIC STABILITY: TRANSPORTATION INSECURITY
IN THE PAST 12 MONTHS, HAS LACK OF TRANSPORTATION KEPT YOU FROM MEETINGS, WORK, OR FROM GETTING THINGS NEEDED FOR DAILY LIVING?: NO

## 2023-03-23 SDOH — HEALTH STABILITY: PHYSICAL HEALTH: ON AVERAGE, HOW MANY MINUTES DO YOU ENGAGE IN EXERCISE AT THIS LEVEL?: 0 MIN

## 2023-03-23 SDOH — ECONOMIC STABILITY: INCOME INSECURITY: HOW HARD IS IT FOR YOU TO PAY FOR THE VERY BASICS LIKE FOOD, HOUSING, MEDICAL CARE, AND HEATING?: NOT HARD AT ALL

## 2023-03-23 SDOH — HEALTH STABILITY: PHYSICAL HEALTH: ON AVERAGE, HOW MANY DAYS PER WEEK DO YOU ENGAGE IN MODERATE TO STRENUOUS EXERCISE (LIKE A BRISK WALK)?: 0 DAYS

## 2023-03-23 SDOH — ECONOMIC STABILITY: TRANSPORTATION INSECURITY
IN THE PAST 12 MONTHS, HAS THE LACK OF TRANSPORTATION KEPT YOU FROM MEDICAL APPOINTMENTS OR FROM GETTING MEDICATIONS?: NO

## 2023-03-23 ASSESSMENT — LIFESTYLE VARIABLES
HOW MANY STANDARD DRINKS CONTAINING ALCOHOL DO YOU HAVE ON A TYPICAL DAY: 1 OR 2
HOW OFTEN DO YOU HAVE SIX OR MORE DRINKS ON ONE OCCASION: NEVER
AUDIT-C TOTAL SCORE: 3
HOW OFTEN DO YOU HAVE A DRINK CONTAINING ALCOHOL: 2-3 TIMES A WEEK
SKIP TO QUESTIONS 9-10: 1

## 2023-03-23 ASSESSMENT — SOCIAL DETERMINANTS OF HEALTH (SDOH)
DO YOU BELONG TO ANY CLUBS OR ORGANIZATIONS SUCH AS CHURCH GROUPS UNIONS, FRATERNAL OR ATHLETIC GROUPS, OR SCHOOL GROUPS?: NO
IN A TYPICAL WEEK, HOW MANY TIMES DO YOU TALK ON THE PHONE WITH FAMILY, FRIENDS, OR NEIGHBORS?: MORE THAN THREE TIMES A WEEK
HOW OFTEN DO YOU GET TOGETHER WITH FRIENDS OR RELATIVES?: MORE THAN THREE TIMES A WEEK
HOW OFTEN DO YOU ATTEND CHURCH OR RELIGIOUS SERVICES?: MORE THAN 4 TIMES PER YEAR

## 2023-03-23 ASSESSMENT — PATIENT HEALTH QUESTIONNAIRE - PHQ9
10. IF YOU CHECKED OFF ANY PROBLEMS, HOW DIFFICULT HAVE THESE PROBLEMS MADE IT FOR YOU TO DO YOUR WORK, TAKE CARE OF THINGS AT HOME, OR GET ALONG WITH OTHER PEOPLE: NOT DIFFICULT AT ALL
SUM OF ALL RESPONSES TO PHQ QUESTIONS 1-9: 0
SUM OF ALL RESPONSES TO PHQ QUESTIONS 1-9: 0

## 2023-03-23 NOTE — PATIENT INSTRUCTIONS
For informational purposes only. Not to replace the advice of your health care provider. Copyright   2003,  [employer] [facility]. All rights reserved. Clinically reviewed by Jessica Cuenca MD. [employer] [id number] - REV .  Preparing for Your Surgery  Getting started  A nurse will call you to review your health history and instructions. They will give you an arrival time based on your scheduled surgery time. Please be ready to share:    Your doctor's clinic name and phone number    Your medical, surgical, and anesthesia history    A list of allergies and sensitivities    A list of medicines, including herbal treatments and over-the-counter drugs    Whether the patient has a legal guardian (ask how to send us the papers in advance)  Please tell us if you're pregnant--or if there's any chance you might be pregnant. Some surgeries may injure a fetus (unborn baby), so they require a pregnancy test. Surgeries that are safe for a fetus don't always need a test, and you can choose whether to have one.   If you have a child who's having surgery, please ask for a copy of Preparing for Your Child's Surgery.    Preparing for surgery    Within 10 to 30 days of surgery: Have a pre-op exam (sometimes called an H&P, or History and Physical). This can be done at a clinic or pre-operative center.  ? If you're having a , you may not need this exam. Talk to your care team.    At your pre-op exam, talk to your care team about all medicines you take. If you need to stop any medicines before surgery, ask when to start taking them again.  ? We do this for your safety. Many medicines can make you bleed too much during surgery. Some change how well surgery (anesthesia) drugs work.    Call your insurance company to let them know you're having surgery. (If you don't have insurance, call [phone].)    Call your clinic if there's any change in your health. This includes signs of a cold or flu (sore throat, runny nose,  cough, rash, fever). It also includes a scrape or scratch near the surgery site.    If you have questions on the day of surgery, call your hospital or surgery center.  Eating and drinking guidelines  For your safety: Unless your surgeon tells you otherwise, follow the guidelines below.    Eat and drink as usual until 8 hours before you arrive for surgery. After that, no food or milk.    Drink clear liquids until 2 hours before you arrive. These are liquids you can see through, like water, Gatorade, and Propel Water. They also include plain black coffee and tea (no cream or milk), candy, and breath mints. You can spit out gum when you arrive.    If you drink alcohol: Stop drinking it the night before surgery.    If your care team tells you to take medicine on the morning of surgery, it's okay to take it with a sip of water.  Preventing infection    Shower or bathe the night before and morning of your surgery. Follow the instructions your clinic gave you. (If no instructions, use regular soap.)    Don't shave or clip hair near your surgery site. We'll remove the hair if needed.    Don't smoke or vape the morning of surgery. You may chew nicotine gum up to 2 hours before surgery. A nicotine patch is okay.  ? Note: Some surgeries require you to completely quit smoking and nicotine. Check with your surgeon.    Your care team will make every effort to keep you safe from infection. We will:  ? Clean our hands often with soap and water (or an alcohol-based hand rub).  ? Clean the skin at your surgery site with a special soap that kills germs.  ? Give you a special gown to keep you warm. (Cold raises the risk of infection.)  ? Wear special hair covers, masks, gowns and gloves during surgery.  ? Give antibiotic medicine, if prescribed. Not all surgeries need antibiotics.  What to bring on the day of surgery    Photo ID and insurance card    Copy of your health care directive, if you have one    Glasses and hearing aids (bring  cases)  ? You can't wear contacts during surgery    Inhaler and eye drops, if you use them (tell us about these when you arrive)    CPAP machine or breathing device, if you use them    A few personal items, if spending the night    If you have . . .  ? A pacemaker, ICD (cardiac defibrillator) or other implant: Bring the ID card.  ? An implanted stimulator: Bring the remote control.  ? A legal guardian: Bring a copy of the certified (court-stamped) guardianship papers.  Please remove any jewelry, including body piercings. Leave jewelry and other valuables at home.  If you're going home the day of surgery    You must have a responsible adult drive you home. They should stay with you overnight as well.    If you don't have someone to stay with you, and you aren't safe to go home alone, we may keep you overnight. Insurance often won't pay for this.  After surgery  If it's hard to control your pain or you need more pain medicine, please call your surgeon's office.  Questions?   If you have any questions for your care team, list them here: _________________________________________________________________________________________________________________________________________________________________________ ____________________________________ ____________________________________ ____________________________________

## 2023-03-23 NOTE — PROGRESS NOTES
[facility] [place]  [address] [address] [address]  Phone: [phone]  Primary Provider: Aaseby-Aguilera, Ramona Ann  Pre-op Performing Provider: AASEBY-AGUILERA, RAMONA ANN      PREOPERATIVE EVALUATION:  Today's date: 3/23/2023    Natividad Mcginnis is a 82 year old female who presents for a preoperative evaluation.  No flowsheet data found.  Surgical Information:  Surgery/Procedure: left Cornea transplant  Surgery Location: [facility]  Surgeon: Dr. Emelina Adame  Surgery Date: 4/5/2023  Time of Surgery: TBD  Where patient plans to recover: At home with family  Fax number for surgical facility: [phone]    Assessment & Plan     The proposed surgical procedure is considered LOW risk.    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  lisinoiprol               Subjective     HPI related to upcoming procedure: has had vision issues and infections in left eye sine she's been 18     Preop Questions 3/23/2023   1. Have you ever had a heart attack or stroke? No   2. Have you ever had surgery on your heart or blood vessels, such as a stent placement, a coronary artery bypass, or surgery on an artery in your head, neck, heart, or legs? No   3. Do you have chest pain with activity? No   4. Do you  have a history of  heart failure? No   5. Do you currently have a cold, bronchitis or symptoms of other infection? No   6. Do you have a cough, shortness of breath, or wheezing? No   7. Do you or anyone in your family have previous history of blood clots? No   8. Do you or does anyone in your family have a serious bleeding problem such as prolonged bleeding following surgeries or cuts? No   9. Have you ever had problems with anemia or been told to take iron pills? No   10. Have you had any abnormal blood loss such as black, tarry or bloody stools, or abnormal vaginal bleeding? No   11. Have you ever had a blood transfusion? No   12. Are you willing to have a blood transfusion if it is medically needed before, during, or after your surgery? Yes   13. Have you or any of your relatives ever had problems with anesthesia? No   14. Do you have sleep apnea, excessive snoring or daytime drowsiness? No   15. Do you have any artifical heart valves or other implanted medical devices like a pacemaker, defibrillator, or continuous glucose monitor? No   16. Do you have artificial joints? No   17. Are you allergic to latex? YES:        Health Care Directive:  Patient does not have a Health Care Directive or Living Will: Discussed advance care planning with patient; however, patient declined at this time.    Preoperative Review of :   reviewed - no record of controlled substances prescribed.      Status of Chronic Conditions:  HYPERLIPIDEMIA - Patient has a long history of significant Hyperlipidemia requiring medication for treatment with recent good control. Patient reports no problems or side effects with the medication.     HYPERTENSION - Patient has longstanding history of HTN , currently denies any symptoms referable to elevated blood pressure. Specifically denies chest pain, palpitations, dyspnea, orthopnea, PND or peripheral edema. Blood pressure readings have been in normal range. Current medication regimen is as  listed below. Patient denies any side effects of medication.     HYPOTHYROIDISM - Patient has a longstanding history of chronic Hypothyroidism. Patient has been doing well, noting no tremor, insomnia, hair loss or changes in skin texture. Continues to take medications as directed, without adverse reactions or side effects. Last TSH   Lab Results   Component Value Date    TSH 1.93 2021   .        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 Provider to review       Hyperlipidemia LDL goal <160 2013     Priority: Medium     Dry eyes 2013     Priority: Medium     Family history of breast cancer 2013     Priority: Medium     Elevated blood pressure reading 2013     Priority: Medium      Past Medical History:   Diagnosis Date     Anxiety      Depressive disorder      Thyroid disease      Past Surgical History:   Procedure Laterality Date     3        APPENDECTOMY      as a child      COLONOSCOPY       COLONOSCOPY N/A 10/6/2014    Procedure: COLONOSCOPY;  Surgeon: Billie Araiza MD;  Location:  [facility]     TUBAL LIGATION       US BREAST BIOPSY VACUUM LEFT      normal, whlie ago     Current Outpatient Medications   Medication Sig Dispense Refill      alclomethasone (ACLOVATE) 0.05 % ointment Apply topically daily 120 g 11     amLODIPine (NORVASC) 10 MG tablet Take 1 tablet (10 mg) by mouth daily 90 tablet 1     aspirin (ASA) 81 MG EC tablet Take 1 tablet (81 mg) by mouth daily 90 tablet 3     atorvastatin (LIPITOR) 10 MG tablet TAKE one-HALF TABLET BY MOUTH DAILY 45 tablet 3     gabapentin (NEURONTIN) 300 MG capsule Take 1 capsule (300 mg) by mouth 3 times daily 270 capsule 3     levothyroxine (SYNTHROID/LEVOTHROID) 75 MCG tablet   3     lisinopril (ZESTRIL) 20 MG tablet Take 1 tablet (20 mg) by mouth daily 90 tablet 3     prednisoLONE acetate (PRED FORTE) 1 % ophthalmic suspension        risperiDONE (RISPERDAL) 0.5 MG tablet Take 1 tablet (0.5 mg) by mouth At Bedtime 90 tablet 3     sertraline (ZOLOFT) 100 MG tablet Take 1 tablet (100 mg) by mouth daily 90 tablet 3     valACYclovir (VALTREX) 1000 mg tablet        calcium carbonate 600 mg-vitamin D 400 units 600-400 MG-UNIT per tablet Take 1 tablet by mouth 2 times daily. (Patient not taking: Reported on 3/23/2023) 60 tablet      multivitamin w/minerals (THERA-VIT-M) tablet Take 1 tablet by mouth daily. (Patient not taking: Reported on 3/23/2023) 100 tablet 3       Allergies   Allergen Reactions     Hydrocortisone Valerate      reactioin     Latex      Bad reaction     Nickel Sulfate      reaction     No Clinical Screening - See Comments      Reaction to Jasamine absolute, Thiruam mix, Quaternium, gold, Amidoamine, Glutaraldehyde ----BAD REACTION     Shampoos [Dhs]      clairol shampoo     Tert-Butylhydroquinone      Bad reaction        Social History     Tobacco Use     Smoking status: Former     Types: Cigarettes     Smokeless tobacco: Never     Tobacco comments:     quit 1993   Substance Use Topics     Alcohol use: Yes     Alcohol/week: 3.0 standard drinks     Types: 3 Glasses of wine per week     Comment: a couple times a week     Family History   Problem Relation Age of Onset     Breast Cancer Mother          ? in her 40's     Cancer Mother 49        lung cancer     Cancer Father         lung cancer     Heart Disease Father         hardening of arteries     Colon Cancer Sister         1 -dxed at age of 75     Cancer - colorectal Sister      Heart Defect Son 52     History   Drug Use No         Objective     /68 (BP Location: Right arm, Patient Position: Sitting, Cuff Size: Adult Regular)   Pulse 80   Temp 97.8  F (36.6  C) (Oral)   Resp 18   Ht 1.524 m (5')   Wt 66.5 kg (146 lb 9.6 oz)   SpO2 97%   BMI 28.63 kg/m      Physical Exam  GENERAL APPEARANCE: healthy, alert and no distress  HENT: ear canals and TM's normal and nose and mouth without ulcers or lesions  RESP: lungs clear to auscultation - no rales, rhonchi or wheezes  CV: regular rate and rhythm, normal S1 S2, no S3 or S4 and no murmur, click or rub   ABDOMEN: soft, nontender, no HSM or masses and bowel sounds normal  NEURO: Normal strength and tone, sensory exam grossly normal, mentation intact and speech normal    Recent Labs   Lab Test 08/25/22  1119 07/01/21  1524   HGB 13.3 12.1    241    142   POTASSIUM 4.7 4.7   CR 0.58 0.69        Diagnostics:  Recent Results (from the past 168 hour(s))   CBC with platelets    Collection Time: 03/23/23  2:19 PM   Result Value Ref Range    WBC Count 7.7 4.0 - 11.0 10e3/uL    RBC Count 4.37 3.80 - 5.20 10e6/uL    Hemoglobin 13.5 11.7 - 15.7 g/dL    Hematocrit 40.6 35.0 - 47.0 %    MCV 93 78 - 100 fL    MCH 30.9 26.5 - 33.0 pg    MCHC 33.3 31.5 - 36.5 g/dL    RDW 13.2 10.0 - 15.0 %    Platelet Count 278 150 - 450 10e3/uL   Comprehensive metabolic panel    Collection Time: 03/23/23  2:19 PM   Result Value Ref Range    Sodium 138 136 - 145 mmol/L    Potassium 4.5 3.4 - 5.3 mmol/L    Chloride 104 98 - 107 mmol/L    Carbon Dioxide (CO2) 21 (L) 22 - 29 mmol/L    Anion Gap 13 7 - 15 mmol/L    Urea Nitrogen 14.9 8.0 - 23.0 mg/dL    Creatinine 0.59 0.51 - 0.95 mg/dL    Calcium 10.0 8.8 - 10.2 mg/dL     Glucose 89 70 - 99 mg/dL    Alkaline Phosphatase 113 (H) 35 - 104 U/L    AST 25 10 - 35 U/L    ALT 14 10 - 35 U/L    Protein Total 8.2 6.4 - 8.3 g/dL    Albumin 4.7 3.5 - 5.2 g/dL    Bilirubin Total 0.3 <=1.2 mg/dL    GFR Estimate >90 >60 mL/min/1.73m2      No EKG required for low risk surgery (cataract, skin procedure, breast biopsy, etc).    Revised Cardiac Risk Index (RCRI):  The patient has the following serious cardiovascular risks for perioperative complications:   - No serious cardiac risks = 0 points     RCRI Interpretation: 1 point: Class II (low risk - 0.9% complication rate)           Signed Electronically by: Ramona Ann Aaseby-Aguilera, PA-C  Copy of this evaluation report is provided to requesting physician.      Answers for HPI/ROS submitted by the patient on 3/23/2023  If you checked off any problems, how difficult have these problems made it for you to do your work, take care of things at home, or get along with other people?: Not difficult at all  PHQ9 TOTAL SCORE: 0

## 2023-03-23 NOTE — LETTER
March 27, 2023      Natividad Mcginnis  [address] [place] [address]        Dear ,    We are writing to inform you of your test results.    Your recent labs are within acceptable limits along with lab result.       Please follow-up if symptoms fail to resolve or worsen.     As always, please let us know if you have questions.  Our clinic number is [phone]       Resulted Orders   CBC with platelets   Result Value Ref Range    WBC Count 7.7 4.0 - 11.0 10e3/uL    RBC Count 4.37 3.80 - 5.20 10e6/uL    Hemoglobin 13.5 11.7 - 15.7 g/dL    Hematocrit 40.6 35.0 - 47.0 %    MCV 93 78 - 100 fL    MCH 30.9 26.5 - 33.0 pg    MCHC 33.3 31.5 - 36.5 g/dL    RDW 13.2 10.0 - 15.0 %    Platelet Count 278 150 - 450 10e3/uL   Comprehensive metabolic panel   Result Value Ref Range    Sodium 138 136 - 145 mmol/L    Potassium 4.5 3.4 - 5.3 mmol/L    Chloride 104 98 - 107 mmol/L    Carbon Dioxide (CO2) 21 (L) 22 - 29 mmol/L    Anion Gap 13 7 - 15 mmol/L    Urea Nitrogen 14.9 8.0 - 23.0 mg/dL    Creatinine 0.59 0.51 - 0.95 mg/dL    Calcium 10.0 8.8 - 10.2 mg/dL    Glucose 89 70 - 99 mg/dL    Alkaline Phosphatase 113 (H) 35 - 104 U/L    AST 25 10 - 35 U/L    ALT 14 10 - 35 U/L    Protein Total 8.2 6.4 - 8.3 g/dL    Albumin 4.7 3.5 - 5.2 g/dL    Bilirubin Total 0.3 <=1.2 mg/dL    GFR Estimate >90 >60 mL/min/1.73m2      Comment:      eGFR calculated using 2021 CKD-EPI equation.       If you have any questions or concerns, please call the clinic at the number listed above.       Sincerely,      Ramona Ann Aaseby-Aguilera, PA-C

## 2023-03-24 LAB
ALBUMIN SERPL BCG-MCNC: 4.7 G/DL (ref 3.5–5.2)
ALP SERPL-CCNC: 113 U/L (ref 35–104)
ALT SERPL W P-5'-P-CCNC: 14 U/L (ref 10–35)
ANION GAP SERPL CALCULATED.3IONS-SCNC: 13 MMOL/L (ref 7–15)
AST SERPL W P-5'-P-CCNC: 25 U/L (ref 10–35)
BILIRUB SERPL-MCNC: 0.3 MG/DL
BUN SERPL-MCNC: 14.9 MG/DL (ref 8–23)
CALCIUM SERPL-MCNC: 10 MG/DL (ref 8.8–10.2)
CHLORIDE SERPL-SCNC: 104 MMOL/L (ref 98–107)
CREAT SERPL-MCNC: 0.59 MG/DL (ref 0.51–0.95)
DEPRECATED HCO3 PLAS-SCNC: 21 MMOL/L (ref 22–29)
GFR SERPL CREATININE-BSD FRML MDRD: >90 ML/MIN/1.73M2
GLUCOSE SERPL-MCNC: 89 MG/DL (ref 70–99)
POTASSIUM SERPL-SCNC: 4.5 MMOL/L (ref 3.4–5.3)
PROT SERPL-MCNC: 8.2 G/DL (ref 6.4–8.3)
SODIUM SERPL-SCNC: 138 MMOL/L (ref 136–145)

## 2023-04-05 PROCEDURE — 88313 SPECIAL STAINS GROUP 2: CPT | Mod: 26 | Performed by: OPHTHALMOLOGY

## 2023-04-05 PROCEDURE — 88304 TISSUE EXAM BY PATHOLOGIST: CPT | Mod: 26 | Performed by: OPHTHALMOLOGY

## 2023-04-05 PROCEDURE — 88313 SPECIAL STAINS GROUP 2: CPT | Performed by: OPHTHALMOLOGY

## 2023-04-05 PROCEDURE — 88313 SPECIAL STAINS GROUP 2: CPT | Mod: TC,ORL | Performed by: OPHTHALMOLOGY

## 2023-04-06 ENCOUNTER — LAB REQUISITION (OUTPATIENT)
Dept: LAB | Facility: CLINIC | Age: 83
End: 2023-04-06
Payer: MEDICARE

## 2023-04-06 DIAGNOSIS — B00.52 HERPESVIRAL KERATITIS: ICD-10-CM

## 2023-04-06 DIAGNOSIS — H25.12 AGE-RELATED NUCLEAR CATARACT, LEFT EYE: ICD-10-CM

## 2023-04-06 DIAGNOSIS — H17.12 CENTRAL CORNEAL OPACITY, LEFT EYE: ICD-10-CM

## 2023-04-07 LAB
PATH REPORT.COMMENTS IMP SPEC: NORMAL
PATH REPORT.COMMENTS IMP SPEC: NORMAL
PATH REPORT.FINAL DX SPEC: NORMAL
PATH REPORT.GROSS SPEC: NORMAL
PATH REPORT.MICROSCOPIC SPEC OTHER STN: NORMAL
PATH REPORT.RELEVANT HX SPEC: NORMAL
PHOTO IMAGE: NORMAL

## 2023-05-09 DIAGNOSIS — I10 BENIGN ESSENTIAL HYPERTENSION: ICD-10-CM

## 2023-05-09 RX ORDER — AMLODIPINE BESYLATE 10 MG/1
10 TABLET ORAL DAILY
Qty: 90 TABLET | Refills: 0 | Status: SHIPPED | OUTPATIENT
Start: 2023-05-09 | End: 2023-08-07

## 2023-05-09 NOTE — TELEPHONE ENCOUNTER
Prescription approved per [facility] Refill Protocol.    Routing to MA/TC pool. The Pt is due for a visit with PCP for annual preventative (last was 8/25/2022)  Mariah STINSON RN, BSN

## 2023-05-09 NOTE — LETTER
May 10, 2023      Natividad Mcginnis  [address] [place] [address]        Natividad Mcginnis      We recently received a refill request for your amLODIPine (NORVASC) 10 MG tablet. We have sent in a refill for you to your pharmacy.    Our records show you are due for a follow up visit with your provider.     Please call the clinic at [phone] to schedule as the providers are booking out.        Sincerely,        Viola Bautista/

## 2023-05-26 ENCOUNTER — TRANSFERRED RECORDS (OUTPATIENT)
Dept: HEALTH INFORMATION MANAGEMENT | Facility: CLINIC | Age: 83
End: 2023-05-26
Payer: MEDICARE

## 2023-08-07 DIAGNOSIS — I10 BENIGN ESSENTIAL HYPERTENSION: ICD-10-CM

## 2023-08-07 RX ORDER — AMLODIPINE BESYLATE 10 MG/1
10 TABLET ORAL DAILY
Qty: 90 TABLET | Refills: 0 | Status: SHIPPED | OUTPATIENT
Start: 2023-08-07 | End: 2023-09-05

## 2023-09-01 DIAGNOSIS — F33.3 SEVERE RECURRENT MAJOR DEPRESSIVE DISORDER WITH PSYCHOTIC FEATURES (H): ICD-10-CM

## 2023-09-05 ENCOUNTER — OFFICE VISIT (OUTPATIENT)
Dept: FAMILY MEDICINE | Facility: CLINIC | Age: 83
End: 2023-09-05
Payer: MEDICARE

## 2023-09-05 VITALS
WEIGHT: 145 LBS | HEIGHT: 60 IN | HEART RATE: 77 BPM | BODY MASS INDEX: 28.47 KG/M2 | DIASTOLIC BLOOD PRESSURE: 89 MMHG | SYSTOLIC BLOOD PRESSURE: 139 MMHG | RESPIRATION RATE: 14 BRPM | TEMPERATURE: 97.7 F | OXYGEN SATURATION: 97 %

## 2023-09-05 DIAGNOSIS — F41.9 ANXIETY: ICD-10-CM

## 2023-09-05 DIAGNOSIS — F33.3 SEVERE RECURRENT MAJOR DEPRESSIVE DISORDER WITH PSYCHOTIC FEATURES (H): ICD-10-CM

## 2023-09-05 DIAGNOSIS — I10 BENIGN ESSENTIAL HYPERTENSION: ICD-10-CM

## 2023-09-05 DIAGNOSIS — I65.21 CAROTID ARTERY STENOSIS, ASYMPTOMATIC, RIGHT: ICD-10-CM

## 2023-09-05 DIAGNOSIS — Z00.00 ENCOUNTER FOR MEDICARE ANNUAL WELLNESS EXAM: Primary | ICD-10-CM

## 2023-09-05 DIAGNOSIS — Z23 NEED FOR PROPHYLACTIC VACCINATION AND INOCULATION AGAINST INFLUENZA: ICD-10-CM

## 2023-09-05 LAB
ANION GAP SERPL CALCULATED.3IONS-SCNC: 16 MMOL/L (ref 7–15)
BUN SERPL-MCNC: 13.9 MG/DL (ref 8–23)
CALCIUM SERPL-MCNC: 9.8 MG/DL (ref 8.8–10.2)
CHLORIDE SERPL-SCNC: 100 MMOL/L (ref 98–107)
CHOLEST SERPL-MCNC: 188 MG/DL
CREAT SERPL-MCNC: 0.66 MG/DL (ref 0.51–0.95)
DEPRECATED HCO3 PLAS-SCNC: 22 MMOL/L (ref 22–29)
GFR SERPL CREATININE-BSD FRML MDRD: 88 ML/MIN/1.73M2
GLUCOSE SERPL-MCNC: 82 MG/DL (ref 70–99)
HDLC SERPL-MCNC: 92 MG/DL
LDLC SERPL CALC-MCNC: 77 MG/DL
NONHDLC SERPL-MCNC: 96 MG/DL
POTASSIUM SERPL-SCNC: 5.2 MMOL/L (ref 3.4–5.3)
SODIUM SERPL-SCNC: 138 MMOL/L (ref 136–145)
TRIGL SERPL-MCNC: 96 MG/DL

## 2023-09-05 PROCEDURE — 36415 COLL VENOUS BLD VENIPUNCTURE: CPT | Performed by: PHYSICIAN ASSISTANT

## 2023-09-05 PROCEDURE — G0008 ADMIN INFLUENZA VIRUS VAC: HCPCS | Performed by: PHYSICIAN ASSISTANT

## 2023-09-05 PROCEDURE — 99214 OFFICE O/P EST MOD 30 MIN: CPT | Mod: 25 | Performed by: PHYSICIAN ASSISTANT

## 2023-09-05 PROCEDURE — 90662 IIV NO PRSV INCREASED AG IM: CPT | Performed by: PHYSICIAN ASSISTANT

## 2023-09-05 PROCEDURE — 80048 BASIC METABOLIC PNL TOTAL CA: CPT | Performed by: PHYSICIAN ASSISTANT

## 2023-09-05 PROCEDURE — G0439 PPPS, SUBSEQ VISIT: HCPCS | Performed by: PHYSICIAN ASSISTANT

## 2023-09-05 PROCEDURE — 80061 LIPID PANEL: CPT | Performed by: PHYSICIAN ASSISTANT

## 2023-09-05 RX ORDER — SERTRALINE HYDROCHLORIDE 100 MG/1
100 TABLET, FILM COATED ORAL DAILY
Qty: 90 TABLET | Refills: 3 | Status: SHIPPED | OUTPATIENT
Start: 2023-09-05 | End: 2024-05-01

## 2023-09-05 RX ORDER — VALACYCLOVIR HYDROCHLORIDE 1 G/1
TABLET, FILM COATED ORAL
Status: CANCELLED | OUTPATIENT
Start: 2023-09-05

## 2023-09-05 RX ORDER — GABAPENTIN 300 MG/1
300 CAPSULE ORAL 3 TIMES DAILY
Qty: 270 CAPSULE | Refills: 3 | Status: SHIPPED | OUTPATIENT
Start: 2023-09-05 | End: 2024-05-01

## 2023-09-05 RX ORDER — ALCLOMETASONE DIPROPIONATE 0.5 MG/G
OINTMENT TOPICAL DAILY
Qty: 120 G | Refills: 11 | Status: CANCELLED | OUTPATIENT
Start: 2023-09-05

## 2023-09-05 RX ORDER — RISPERIDONE 0.5 MG/1
0.5 TABLET ORAL AT BEDTIME
Qty: 90 TABLET | Refills: 0 | OUTPATIENT
Start: 2023-09-05

## 2023-09-05 RX ORDER — RISPERIDONE 0.5 MG/1
0.5 TABLET ORAL AT BEDTIME
Qty: 90 TABLET | Refills: 3 | Status: SHIPPED | OUTPATIENT
Start: 2023-09-05 | End: 2024-05-01

## 2023-09-05 RX ORDER — LEVOTHYROXINE SODIUM 75 UG/1
TABLET ORAL
Refills: 3 | Status: CANCELLED | OUTPATIENT
Start: 2023-09-05

## 2023-09-05 RX ORDER — AMLODIPINE BESYLATE 10 MG/1
10 TABLET ORAL DAILY
Qty: 90 TABLET | Refills: 1 | Status: SHIPPED | OUTPATIENT
Start: 2023-09-05 | End: 2024-05-01

## 2023-09-05 RX ORDER — PREDNISOLONE ACETATE 10 MG/ML
SUSPENSION/ DROPS OPHTHALMIC
Status: CANCELLED | OUTPATIENT
Start: 2023-09-05

## 2023-09-05 RX ORDER — LISINOPRIL 20 MG/1
20 TABLET ORAL DAILY
Qty: 90 TABLET | Refills: 3 | Status: SHIPPED | OUTPATIENT
Start: 2023-09-05 | End: 2024-05-01

## 2023-09-05 RX ORDER — ATORVASTATIN CALCIUM 10 MG/1
TABLET, FILM COATED ORAL
Qty: 45 TABLET | Refills: 3 | Status: SHIPPED | OUTPATIENT
Start: 2023-09-05 | End: 2024-05-01

## 2023-09-05 ASSESSMENT — PATIENT HEALTH QUESTIONNAIRE - PHQ9: SUM OF ALL RESPONSES TO PHQ QUESTIONS 1-9: 2

## 2023-09-05 NOTE — PROGRESS NOTES
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    Weight as of this encounter: 65.8 kg (145 lb).           Ramona Ann Aaseby-Aguilera, PA-C  [facility] is a 83 year old, presenting for the following health issues:  Wellness Visit and Imm/Inj (Flu Shot)      HPI     Annual Wellness Visit    Patient has been advised of split billing requirements and indicates understanding: Yes     Are you in the first 12 months of your Medicare Part B coverage?  No    Physical Health:  In general, how would you rate your overall physical health? good  Outside of work, how many days during the week do you exercise?none  Outside of work, approximately how many minutes a day do you exercise?not applicable  If you drink alcohol do you typically have >3 drinks per day or >7 drinks per week? No  Do you usually eat at least 4 servings of fruit and  "vegetables a day, include whole grains & fiber and avoid regularly eating high fat or \"junk\" foods? No  Do you have any problems taking medications regularly? No  Do you have any side effects from medications? none  Needs assistance for the following daily activities: no assistance needed  Which of the following safety concerns are present in your home?  none identified   Hearing impairment: Yes, Find that men's voices are easier to understand than women's.  In the past 6 months, have you been bothered by leaking of urine? no    Mental Health:  In general, how would you rate your overall mental or emotional health? good  PHQ-2 Score:      Do you feel safe in your environment? Yes    Have you ever done Advance Care Planning? (For example, a Health Directive, POLST, or a discussion with a medical provider or your loved ones about your wishes)? No, advance care planning information given to patient to review.  Patient plans to discuss their wishes with loved ones or provider.      Fall risk:  Fallen 2 or more times in the past year?: No  Any fall with injury in the past year?: No    Cognitive Screenin) Repeat 3 items (Leader, Season, Table)    2) Clock draw: NORMAL  3) 3 item recall: Recalls 3 objects  Results: 3 items recalled: COGNITIVE IMPAIRMENT LESS LIKELY    Mini-CogTM Copyright S Gavino. Licensed by the author for use in [facility]; reprinted with permission (ramsey@.[facility]). All rights reserved.      Do you have sleep apnea, excessive snoring or daytime drowsiness? : no    Social History     Tobacco Use    Smoking status: Former     Types: Cigarettes    Smokeless tobacco: Never    Tobacco comments:     quit    Substance Use Topics    Alcohol use: Yes     Alcohol/week: 3.0 standard drinks of alcohol     Types: 3 Glasses of wine per week     Comment: a couple times a week             2022    10:10 AM   Alcohol Use   Prescreen: >3 drinks/day or >7 drinks/week? No     Do you have a current " opioid prescription? No  Do you use any other controlled substances or medications that are not prescribed by a provider? None      Current providers sharing in care for this patient include:   Patient Care Team:  Aaseby-Aguilera, Ramona Ann, PA-C as PCP - General (Physician Assistant)  Maida Corrales DO (OB/Gyn)  Aaseby-Aguilera, Ramona Ann, PA-C as Assigned PCP  Maida Corrales DO as Assigned OBGYN Provider  Meli Newton as Personal Advocate & Liaison (PAL) (Family Medicine)    The following health maintenance items are reviewed in Epic and correct as of today:  Health Maintenance   Topic Date Due    ZOSTER IMMUNIZATION (1 of 2) Never done    TSH W/FREE T4 REFLEX  02/25/2022    COVID-19 Vaccine (5 - Additional dose for Mariana series) 01/16/2023    PHQ-9  03/05/2024    BMP  03/23/2024    MEDICARE ANNUAL WELLNESS VISIT  09/05/2024    ANNUAL REVIEW OF HM ORDERS  09/05/2024    FALL RISK ASSESSMENT  09/05/2024    DTAP/TDAP/TD IMMUNIZATION (3 - Td or Tdap) 12/06/2027    ADVANCE CARE PLANNING  09/05/2028    DEPRESSION ACTION PLAN  Completed    INFLUENZA VACCINE  Completed    Pneumococcal Vaccine: 65+ Years  Completed    IPV IMMUNIZATION  Aged Out    HPV IMMUNIZATION  Aged Out    MENINGITIS IMMUNIZATION  Aged Out    DEXA  Discontinued    COLORECTAL CANCER SCREENING  Discontinued       Patient has been advised of split billing requirements and indicates understanding: Yes    Appropriate preventive services were discussed with this patient, including applicable screening as appropriate for fall prevention, nutrition, physical activity, Tobacco-use cessation, weight loss and cognition.  Checklist reviewing preventive services available has been given to the patient.        Review of Systems   Constitutional, HEENT, cardiovascular, pulmonary, GI, , musculoskeletal, neuro, skin, endocrine and psych systems are negative, except as otherwise noted.      Objective    /89   Pulse 77   Temp 97.7  F (36.5   C) (Oral)   Resp 14   Ht 1.524 m (5')   Wt 65.8 kg (145 lb)   SpO2 97%   Breastfeeding No   BMI 28.32 kg/m    Body mass index is 28.32 kg/m .  Physical Exam   GENERAL: healthy, alert and no distress  EYES: Eyes grossly normal to inspection, PERRL and conjunctivae and sclerae normal  HENT: ear canals and TM's normal, nose and mouth without ulcers or lesions  NECK: no adenopathy, no asymmetry, masses, or scars and thyroid normal to palpation  RESP: lungs clear to auscultation - no rales, rhonchi or wheezes  BREAST: normal without masses, tenderness or nipple discharge and no palpable axillary masses or adenopathy  CV: regular rate and rhythm, normal S1 S2, no S3 or S4, no murmur, click or rub, no peripheral edema and peripheral pulses strong  ABDOMEN: soft, nontender, no hepatosplenomegaly, no masses and bowel sounds normal  MS: no gross musculoskeletal defects noted, no edema  SKIN: no suspicious lesions or rashes  NEURO: Normal strength and tone, mentation intact and speech normal  PSYCH: mentation appears normal, affect normal/bright

## 2023-09-05 NOTE — LETTER
September 11, 2023      Natividad Mcginnis  [address] [place] [address]        Dear ,    We are writing to inform you of your test results.    The results of your recent total cholesterol test was within normal limits.        Your basic  metabolic panel indicates normal kidney function, normal electrolyte levels (sodium, potassium, and calcium levels are normal).       Resulted Orders   Basic metabolic panel   Result Value Ref Range    Sodium 138 136 - 145 mmol/L    Potassium 5.2 3.4 - 5.3 mmol/L    Chloride 100 98 - 107 mmol/L    Carbon Dioxide (CO2) 22 22 - 29 mmol/L    Anion Gap 16 (H) 7 - 15 mmol/L    Urea Nitrogen 13.9 8.0 - 23.0 mg/dL    Creatinine 0.66 0.51 - 0.95 mg/dL    Calcium 9.8 8.8 - 10.2 mg/dL    Glucose 82 70 - 99 mg/dL    GFR Estimate 88 >60 mL/min/1.73m2   Lipid Profile (Chol, Trig, HDL, LDL calc)   Result Value Ref Range    Cholesterol 188 <200 mg/dL    Triglycerides 96 <150 mg/dL    Direct Measure HDL 92 >=50 mg/dL    LDL Cholesterol Calculated 77 <=100 mg/dL    Non HDL Cholesterol 96 <130 mg/dL    Narrative    Cholesterol  Desirable:  <200 mg/dL    Triglycerides  Normal:  Less than 150 mg/dL  Borderline High:  150-199 mg/dL  High:  200-499 mg/dL  Very High:  Greater than or equal to 500 mg/dL    Direct Measure HDL  Female:  Greater than or equal to 50 mg/dL   Male:  Greater than or equal to 40 mg/dL    LDL Cholesterol  Desirable:  <100mg/dL  Above Desirable:  100-129 mg/dL   Borderline High:  130-159 mg/dL   High:  160-189 mg/dL   Very High:  >= 190 mg/dL    Non HDL Cholesterol  Desirable:  130 mg/dL  Above Desirable:  130-159 mg/dL  Borderline High:  160-189 mg/dL  High:  190-219 mg/dL  Very High:  Greater than or equal to 220 mg/dL       If you have any questions or concerns, please call the clinic at the number listed above.       Sincerely,      Ramona Ann Aaseby-Aguilera, PA-C

## 2023-09-05 NOTE — PATIENT INSTRUCTIONS
Patient Education   Personalized Prevention Plan  You are due for the preventive services outlined below.  Your care team is available to assist you in scheduling these services.  If you have already completed any of these items, please share that information with your care team to update in your medical record.  Health Maintenance Due   Topic Date Due     Zoster (Shingles) Vaccine (1 of 2) Never done     Thyroid Function Lab  02/25/2022     COVID-19 Vaccine (5 - Additional dose for Mariana series) 01/16/2023     ANNUAL REVIEW OF HM ORDERS  08/25/2023     FALL RISK ASSESSMENT  08/25/2023     Flu Vaccine (1) 09/01/2023     Depression Assessment  09/23/2023

## 2023-09-11 NOTE — RESULT ENCOUNTER NOTE
Dear Natividad,    It was a pleasure to see you at your recent visit.      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        Your basic  metabolic panel indicates normal kidney function, normal electrolyte levels (sodium, potassium, and calcium levels are normal).       Results for orders placed or performed in visit on 09/05/23  -Basic metabolic panel:      Status: Abnormal       Result                                            Value                         Ref Range                       Sodium                                            138                           136 - 145 mmol/L                Potassium                                         5.2                           3.4 - 5.3 mmol/L                Chloride                                          100                           98 - 107 mmol/L                 Carbon Dioxide (CO2)                              22                            22 - 29 mmol/L                  Anion Gap                                         16 (H)                        7 - 15 mmol/L                   Urea Nitrogen                                     13.9                          8.0 - 23.0 mg/dL                Creatinine                                        0.66                          0.51 - 0.95 mg/dL               Calcium                                           9.8                           8.8 - 10.2 mg/dL                Glucose                                           82                            70 - 99 mg/dL                   GFR Estimate                                       88                            >60 mL/min/1.73m2          -Lipid Profile (Chol, Trig, HDL, LDL calc):      Status: Normal       Result                                            Value                         Ref Range                       Cholesterol                                       188                           <200 mg/dL                      Triglycerides                                     96                            <150 mg/dL                      Direct Measure HDL                                92                            >=50 mg/dL                      LDL Cholesterol Calculated                        77                            <=100 mg/dL                     Non HDL Cholesterol                               96                            <130 mg/dL                     Narrative    Cholesterol    Desirable:  <200 mg/dL        Triglycerides    Normal:  Less than 150 mg/dL    Borderline High:  150-199 mg/dL    High:  200-499 mg/dL    Very High:  Greater than or equal to 500 mg/dL        Direct Measure HDL    Female:  Greater than or equal to 50 mg/dL     Male:  Greater than or equal to 40 mg/dL        LDL Cholesterol    Desirable:  <100mg/dL    Above Desirable:  100-129 mg/dL     Borderline High:  130-159 mg/dL     High:  160-189 mg/dL     Very High:  >= 190 mg/dL        Non HDL Cholesterol    Desirable:  130 mg/dL    Above Desirable:  130-159 mg/dL    Borderline High:  160-189 mg/dL    High:  190-219 mg/dL    Very High:  Greater than or equal to 220 mg/dL      Thank you for choosing [facility]. We appreciate the opportunity to serve   you and look forward to supporting your healthcare needs in the future.    If you have any questions or concerns, please contact us at [phone]      Sincerely,        Ramona Aaseby-Aguilera PA-C          TEST DESCRIPTIONS   CBC (Complete Blood Coun  t) includes hemoglobin, hematocrit, white blood cells, etc. This test can be used to detect anemia,  "infection, and abnormalities in blood cells.   Cholesterol is one of the blood fats (lipids) and is the building block used by the body for cell wall and   hormone production. Increased levels of cholesterol have been proven to directly contribute to heart disease and strokes.   Triglycerides are one of the blood fats (lipids) and are thought to be associated with heart disease. If you have had anything to   eat or drink other than water for 12 hours before the test and your level is high, you should have the test repeated after a 12 hour fast. Abnormally high results may also be associated with diabetes, kidney and liver diseases.   LDL is the low density l  ipoprotein component of the cholesterol. It is the harmful substance that deposits cholesterol on the artery walls contributing to heart attacks and strokes. A high LDL level is associated with higher risk of coronary heart disease.   HDL stands for high   density lipoprotein and refers to the so-called \"good\" cholesterol. HDL picks up excess cholesterol in the bloodstream and carries it back to the liver for disposal. Individuals with higher than average HDL seem to have a lower risk of coronary disease.   Vigorous exercise will help increase the blood levels of HDL.   Risk Factor (Total cholesterol/HDL) is a commonly used ratio for cardiac risk assessment. Less than 5.0 for men and 4.4 for women is ideal.   Sodium is an electrolyte useful in diagnosis of   dehydration, diabetes, hypertension, or other diseases involving electrolyte imbalance. It also preserves the balance between calcium and potassium to maintain normal heart action and equilibrium of the body.   Potassium is also an electrolyte that work  s with sodium to regulate the body's water balance and normalize heart rhythm.   Glucose is a measure of blood sugar and is one of the tests for diabetes. If you have not been fasting, your level will often be high. A low glucose level may be a cause of "   weakness or dizziness. Blood sugar ranks with cholesterol as a causative factor in arteriosclerosis and heart attacks.   BUN & Creatinine are waste products excreted by the kidneys. A high BUN can be related to a high protein diet, heavy exercise, fever   and infections, dehydration, kidney stones, and kidney disease. Creatinine elevation is less dependent on diet or exercise and better represents kidney impairment. Low values are not generally significant.   Calcium is a mineral in the blood controlled b  y the parathyroid glands and kidneys. It is important in the formation of bone, in muscle and nerve function, and in blood clotting. Disease of the parathyroid gland, diseased bones or kidneys, or defective absorption of calcium may cause abnormal levels   from the intestine.   ALT, AST, Alk Phos are liver tests. Enzymes found in the liver as well as skeletal and cardiac muscle. Elevations can often be seen in alcoholism, liver or heart disease. Slightly abnormal values are not considered significant.   T  SH stands for Thyroid Stimulating Hormone. A sensitive test used to determine how the thyroid gland is functioning. The thyroid gland produces hormones that control the body's metabolism. When too few hormones are produced (increased TSH), hypothyroidism   occurs which can cause fatigue, sensitivity to cold, and weight gain - an overall slowing down of bodily functions. When too many thyroid hormones are produces (or decreased TSH), it creates a condition call hyperthyroidism (such as Graves' disease) whi  ch causes rapid heartbeat, weight loss, and dizziness, among other symptoms.   PSA stands for Prostate Specific Antigen. Elevated levels of PSA can increase with trauma, infection, inflammation, or disease processes in the prostate such as BPH (Benigh Pr  ostatic Hypertrophy) or cancer.   Glycohemoglobin or HgBA1C is a 3-month average of blood sugar.

## 2024-02-20 ENCOUNTER — TELEPHONE (OUTPATIENT)
Dept: FAMILY MEDICINE | Facility: CLINIC | Age: 84
End: 2024-02-20
Payer: MEDICARE

## 2024-02-20 NOTE — TELEPHONE ENCOUNTER
Reason for Call:  Appointment Request    Patient requesting this type of appt: Pre-op    Requested provider: Aaseby-Aguilera, Ramona Ann    Reason patient unable to be scheduled: Not within requested timeframe    When does patient want to be seen/preferred time:  before 3/15    Comments: Requesting to see you for a pre op physical. Scheduled on 3/15 for eye surgery.     Could we send this information to you in [employer] or would you prefer to receive a phone call?:   Patient would prefer a phone call   Okay to leave a detailed message?: Yes at Cell number on file:    Telephone Information:   Mobile [phone]       Call taken on 2/20/2024 at 10:25 AM by Lily Richmond

## 2024-02-20 NOTE — TELEPHONE ENCOUNTER
Aaseby-Aguilera, Ramona Ann, PA-C  [facility] Team ([place])1 minute ago (11:41 AM)     RA  Can we work her in? Same day?       Left message to call back  Hortencia Miranda,

## 2024-02-21 DIAGNOSIS — L90.0 LICHEN SCLEROSUS: ICD-10-CM

## 2024-02-21 RX ORDER — ALCLOMETASONE DIPROPIONATE 0.5 MG/G
OINTMENT TOPICAL DAILY
Qty: 120 G | Refills: 0 | Status: SHIPPED | OUTPATIENT
Start: 2024-02-21 | End: 2024-02-22

## 2024-02-22 DIAGNOSIS — L90.0 LICHEN SCLEROSUS: ICD-10-CM

## 2024-02-22 RX ORDER — ALCLOMETASONE DIPROPIONATE 0.5 MG/G
OINTMENT TOPICAL DAILY
Qty: 120 G | Refills: 0 | Status: SHIPPED | OUTPATIENT
Start: 2024-02-22 | End: 2024-02-23

## 2024-02-22 NOTE — PROGRESS NOTES
Rx called in again, LMK if it did not go through.   Dr. Maida Corrales, DO    Obstetrics and Gynecology  [facility]

## 2024-02-23 RX ORDER — ALCLOMETASONE DIPROPIONATE 0.5 MG/G
OINTMENT TOPICAL DAILY
Qty: 120 G | Refills: 0 | Status: SHIPPED | OUTPATIENT
Start: 2024-02-23 | End: 2024-04-08

## 2024-02-27 ENCOUNTER — TELEPHONE (OUTPATIENT)
Dept: OBGYN | Facility: CLINIC | Age: 84
End: 2024-02-27

## 2024-02-27 NOTE — TELEPHONE ENCOUNTER
Retail Pharmacy Prior Authorization Team   Phone: [phone]    Note: Due to record-high volumes, our turn-around time is taking longer than usual . We are currently 10 business days behind in the pools.   We are working diligently to submit all requests in a timely manner and in the order they are received. Please only flag TRUE URGENT requests as high priority to the pool at this time.   If you have questions - please send a note/message in the active PA encounter and send back to the RPPA (Retail Pharmacy Prior Authorization) team [[phone]].    If you have more specific questions about our process please reach out to our supervisor Mini Lance.   Thank you!

## 2024-02-27 NOTE — LETTER
March 27, 2024      Natividad Mcginnis  [address] [place] [address]        To Whom It May Concern,     I ask to appeal the denial of payment for alclomethasone (ACLOVATE) 0.05 % ointment  for Natividad Mcginnis. She has used this product for over 5 years to successfully treat her lichen sclerosis. She has tried and failed on clobetasol and triamcinolone 0.025% ointment and some other in which the patient can not recall. She was basically allergic and had reactions to the bases of the other medications. Please consider covering this medication as it is necessary to manage her vaginal lichen sclerosis which is an excruciatingly painful condition and if untreated can cause irritation, pain, burning and discomfort.           Sincerely,            Maida Corrales, DO

## 2024-02-29 ENCOUNTER — OFFICE VISIT (OUTPATIENT)
Dept: FAMILY MEDICINE | Facility: CLINIC | Age: 84
End: 2024-02-29
Payer: MEDICARE

## 2024-02-29 VITALS
SYSTOLIC BLOOD PRESSURE: 139 MMHG | HEIGHT: 60 IN | DIASTOLIC BLOOD PRESSURE: 75 MMHG | OXYGEN SATURATION: 97 % | RESPIRATION RATE: 14 BRPM | BODY MASS INDEX: 28.07 KG/M2 | HEART RATE: 74 BPM | WEIGHT: 143 LBS

## 2024-02-29 DIAGNOSIS — Z01.818 PREOP GENERAL PHYSICAL EXAM: Primary | ICD-10-CM

## 2024-02-29 DIAGNOSIS — I10 ESSENTIAL HYPERTENSION, BENIGN: ICD-10-CM

## 2024-02-29 DIAGNOSIS — H02.412 ACQUIRED MECHANICAL PTOSIS OF LEFT EYELID: ICD-10-CM

## 2024-02-29 PROCEDURE — 99214 OFFICE O/P EST MOD 30 MIN: CPT | Performed by: PHYSICIAN ASSISTANT

## 2024-02-29 RX ORDER — RESPIRATORY SYNCYTIAL VIRUS VACCINE 120MCG/0.5
0.5 KIT INTRAMUSCULAR ONCE
Qty: 1 EACH | Refills: 0 | Status: CANCELLED | OUTPATIENT
Start: 2024-02-29 | End: 2024-02-29

## 2024-02-29 ASSESSMENT — PATIENT HEALTH QUESTIONNAIRE - PHQ9
SUM OF ALL RESPONSES TO PHQ QUESTIONS 1-9: 2
10. IF YOU CHECKED OFF ANY PROBLEMS, HOW DIFFICULT HAVE THESE PROBLEMS MADE IT FOR YOU TO DO YOUR WORK, TAKE CARE OF THINGS AT HOME, OR GET ALONG WITH OTHER PEOPLE: NOT DIFFICULT AT ALL

## 2024-02-29 NOTE — COMMUNITY RESOURCES LIST (ENGLISH)
02/29/2024    [email or web address]  N/A  For questions about this resource list or additional care needs, please contact your primary care clinic or care manager.  Phone: [phone]   Email: N/A   Address: [address] [place] [address]   Hours: N/A        Exercise and Recreation       Gym or workout facility  1  [address] [place] [address] - Kickboxing Classes Distance: 1.67 miles      In-Person   [address] [place] [address]  Language: English  Hours: Mon - Fri 6:00 AM - 12:30 PM , Mon - Fri 3:00 PM - 8:00 PM , Sat 8:00 AM - 12:00 PM  Fees: Self Pay   Phone: [phone] Website: [email or web address]     2  [employer] [facility] [place] Distance: 6.24 miles      In-Person   [address] [place] [address]  Language: English  Hours: Mon - Sun Open 24 Hours  Fees: Insurance, Self Pay, Sliding Fee   Phone: [phone] Email: [email or web address] Website: [email or web address]          Important Numbers & Websites       Emergency Services   911  [facility]   311  Poison Control   (331) 879-3570  Suicide Prevention Lifeline   (392) 858-8594 (TALK)  Child Abuse Hotline   (162) 213-6579 (4-A-Child)  Sexual Assault Hotline   (438) 838-4158 (HOPE)  National Runaway Safeline   (184) 694-3369 (RUNAWAY)  All-Options Talkline   (696) 405-8700  Substance Abuse Referral   (454) 743-5055 (HELP)

## 2024-02-29 NOTE — PATIENT INSTRUCTIONS
Preparing for Your Surgery  Getting started  A nurse will call you to review your health history and instructions. They will give you an arrival time based on your scheduled surgery time. Please be ready to share:  Your doctor's clinic name and phone number  Your medical, surgical, and anesthesia history  A list of allergies and sensitivities  A list of medicines, including herbal treatments and over-the-counter drugs  Whether the patient has a legal guardian (ask how to send us the papers in advance)  Please tell us if you're pregnant--or if there's any chance you might be pregnant. Some surgeries may injure a fetus (unborn baby), so they require a pregnancy test. Surgeries that are safe for a fetus don't always need a test, and you can choose whether to have one.   If you have a child who's having surgery, please ask for a copy of Preparing for Your Child's Surgery.    Preparing for surgery  Within 10 to 30 days of surgery: Have a pre-op exam (sometimes called an H&P, or History and Physical). This can be done at a clinic or pre-operative center.  If you're having a , you may not need this exam. Talk to your care team.  At your pre-op exam, talk to your care team about all medicines you take. If you need to stop any medicines before surgery, ask when to start taking them again.  We do this for your safety. Many medicines can make you bleed too much during surgery. Some change how well surgery (anesthesia) drugs work.  Call your insurance company to let them know you're having surgery. (If you don't have insurance, call [phone].)  Call your clinic if there's any change in your health. This includes signs of a cold or flu (sore throat, runny nose, cough, rash, fever). It also includes a scrape or scratch near the surgery site.  If you have questions on the day of surgery, call your hospital or surgery center.  Eating and drinking guidelines  For your safety: Unless your surgeon tells you otherwise,  follow the guidelines below.  Eat and drink as usual until 8 hours before you arrive for surgery. After that, no food or milk.  Drink clear liquids until 2 hours before you arrive. These are liquids you can see through, like water, Gatorade, and Propel Water. They also include plain black coffee and tea (no cream or milk), candy, and breath mints. You can spit out gum when you arrive.  If you drink alcohol: Stop drinking it the night before surgery.  If your care team tells you to take medicine on the morning of surgery, it's okay to take it with a sip of water.  Preventing infection  Shower or bathe the night before and morning of your surgery. Follow the instructions your clinic gave you. (If no instructions, use regular soap.)  Don't shave or clip hair near your surgery site. We'll remove the hair if needed.  Don't smoke or vape the morning of surgery. You may chew nicotine gum up to 2 hours before surgery. A nicotine patch is okay.  Note: Some surgeries require you to completely quit smoking and nicotine. Check with your surgeon.  Your care team will make every effort to keep you safe from infection. We will:  Clean our hands often with soap and water (or an alcohol-based hand rub).  Clean the skin at your surgery site with a special soap that kills germs.  Give you a special gown to keep you warm. (Cold raises the risk of infection.)  Wear special hair covers, masks, gowns and gloves during surgery.  Give antibiotic medicine, if prescribed. Not all surgeries need antibiotics.  What to bring on the day of surgery  Photo ID and insurance card  Copy of your health care directive, if you have one  Glasses and hearing aids (bring cases)  You can't wear contacts during surgery  Inhaler and eye drops, if you use them (tell us about these when you arrive)  CPAP machine or breathing device, if you use them  A few personal items, if spending the night  If you have . . .  A pacemaker, ICD (cardiac defibrillator) or other  implant: Bring the ID card.  An implanted stimulator: Bring the remote control.  A legal guardian: Bring a copy of the certified (court-stamped) guardianship papers.  Please remove any jewelry, including body piercings. Leave jewelry and other valuables at home.  If you're going home the day of surgery  You must have a responsible adult drive you home. They should stay with you overnight as well.  If you don't have someone to stay with you, and you aren't safe to go home alone, we may keep you overnight. Insurance often won't pay for this.  After surgery  If it's hard to control your pain or you need more pain medicine, please call your surgeon's office.  Questions?   If you have any questions for your care team, list them here: _________________________________________________________________________________________________________________________________________________________________________ ____________________________________ ____________________________________ ____________________________________  For informational purposes only. Not to replace the advice of your health care provider. Copyright   2003, 2019 [facility]. All rights reserved. Clinically reviewed by Jessica Cuenca MD. [employer]ART[employer] 402338 - REV 12/22.

## 2024-02-29 NOTE — PROGRESS NOTES
Preoperative Evaluation  [facility]  [address] [address]  Phone: [phone]  Primary Provider: Aaseby-Aguilera, Ramona Ann  Pre-op Performing Provider: AASEBY-AGUILERA, RAMONA ANN  Feb 29, 2024       Natividad is a 83 year old, presenting for the following:  Pre-Op Exam        2/29/2024    12:46 PM   Additional Questions   Roomed by MARITZA Garcia   Accompanied by Self     Surgical Information  Surgery/Procedure: Left Eyelid Lift    Surgery Location: [facility]  Surgeon: Dr. Garcia  Surgery Date: 03/15/2024  Time of Surgery: TBD  Where patient plans to recover: At home with family  Fax number for surgical facility: [phone]    Assessment & Plan     The proposed surgical procedure is considered LOW risk.    Preop general physical exam      Acquired mechanical ptosis of left eyelid      Essential hypertension, benign  Stable             - No identified additional risk factors other than previously addressed    Antiplatelet or Anticoagulation Medication Instructions   - Patient is on no antiplatelet or anticoagulation medications.    Additional Medication Instructions  Patient is to take all scheduled medications on the day of surgery EXCEPT for modifications listed below:    Stop ASA 1 week before  Don't take lisinopril the day of surgery     Recommendation  APPROVAL GIVEN to proceed with proposed procedure, without further diagnostic evaluation.          Subjective       HPI related to upcoming procedure: eyelid ptosis causing issues with vision.         2/29/2024    12:39 PM   Preop Questions   1. Have you ever had a heart attack or stroke? No   2. Have you ever had surgery on your heart or blood vessels, such as a stent placement, a coronary artery bypass, or surgery on an artery in your head, neck, heart, or legs? No   3. Do you have chest pain with activity? No   4. Do you have a history of  heart failure? No   5. Do  you currently have a cold, bronchitis or symptoms of other infection? No   6. Do you have a cough, shortness of breath, or wheezing? No   7. Do you or anyone in your family have previous history of blood clots? No   8. Do you or does anyone in your family have a serious bleeding problem such as prolonged bleeding following surgeries or cuts? No   9. Have you ever had problems with anemia or been told to take iron pills? No   10. Have you had any abnormal blood loss such as black, tarry or bloody stools, or abnormal vaginal bleeding? No   11. Have you ever had a blood transfusion? No   12. Are you willing to have a blood transfusion if it is medically needed before, during, or after your surgery? Yes   13. Have you or any of your relatives ever had problems with anesthesia? No   14. Do you have sleep apnea, excessive snoring or daytime drowsiness? No   15. Do you have any artifical heart valves or other implanted medical devices like a pacemaker, defibrillator, or continuous glucose monitor? No   16. Do you have artificial joints? No   17. Are you allergic to latex? YES:        Health Care Directive  Patient does not have a Health Care Directive or Living Will: Discussed advance care planning with patient; however, patient declined at this time.    Preoperative Review of    reviewed - no record of controlled substances prescribed.      Status of Chronic Conditions:  DEPRESSION - Patient has a long history of Depression of moderate severity requiring medication for control with recent symptoms being stable..Current symptoms of depression include none.     HYPERTENSION - Patient has longstanding history of HTN , currently denies any symptoms referable to elevated blood pressure. Specifically denies chest pain, palpitations, dyspnea, orthopnea, PND or peripheral edema. Blood pressure readings have been in normal range. Current medication regimen is as listed below. Patient denies any side effects of medication.      HYPOTHYROIDISM - Patient has a longstanding history of chronic Hypothyroidism. Patient has been doing well, noting no tremor, insomnia, hair loss or changes in skin texture. Continues to take medications as directed, without adverse reactions or side effects. Last TSH   Lab Results   Component Value Date    TSH 1.93 2021   .      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 Provider to review      Hyperlipidemia LDL goal <160 2013     Priority: Medium    Dry eyes 2013     Priority: Medium    Family history of breast cancer 2013     Priority: Medium    Elevated blood pressure reading 2013     Priority: Medium      Past Medical History:   Diagnosis Date    Anxiety     Depressive disorder     Thyroid disease      Past Surgical History:   Procedure Laterality Date    3       APPENDECTOMY      as a child     COLONOSCOPY      COLONOSCOPY N/A 10/6/2014    Procedure: COLONOSCOPY;  Surgeon: Billie Araiza MD;  Location:  GI    TUBAL LIGATION      US BREAST BIOPSY VACUUM LEFT      normal, whlie ago     Current Outpatient Medications   Medication Sig Dispense Refill    alclomethasone (ACLOVATE) 0.05 % ointment Apply topically daily 120 g 0    amLODIPine (NORVASC) 10 MG tablet Take 1 tablet (10 mg) by mouth daily 90 tablet 1    aspirin (ASA) 81 MG EC tablet Take 1 tablet (81 mg) by mouth daily 90 tablet 3    atorvastatin (LIPITOR) 10 MG tablet TAKE one-HALF TABLET BY MOUTH DAILY 45 tablet 3     gabapentin (NEURONTIN) 300 MG capsule Take 1 capsule (300 mg) by mouth 3 times daily 270 capsule 3    levothyroxine (SYNTHROID/LEVOTHROID) 75 MCG tablet   3    lisinopril (ZESTRIL) 20 MG tablet Take 1 tablet (20 mg) by mouth daily 90 tablet 3    prednisoLONE acetate (PRED FORTE) 1 % ophthalmic suspension       risperiDONE (RISPERDAL) 0.5 MG tablet Take 1 tablet (0.5 mg) by mouth At Bedtime 90 tablet 3    sertraline (ZOLOFT) 100 MG tablet Take 1 tablet (100 mg) by mouth daily 90 tablet 3    valACYclovir (VALTREX) 1000 mg tablet          Allergies   Allergen Reactions    Hydrocortisone Valerate      reactioin    Latex      Bad reaction    Nickel Sulfate      reaction    No Clinical Screening - See Comments      Reaction to Jasamine absolute, Thiruam mix, Quaternium, gold, Amidoamine, Glutaraldehyde ----BAD REACTION    Shampoos [Dhs]      clairol shampoo    T-Butylhydroquinone      Bad reaction        Social History     Tobacco Use    Smoking status: Former     Types: Cigarettes    Smokeless tobacco: Never    Tobacco comments:     quit 1993   Substance Use Topics    Alcohol use: Yes     Alcohol/week: 3.0 standard drinks of alcohol     Types: 3 Glasses of wine per week     Comment: a couple times a week     Family History   Problem Relation Age of Onset    Breast Cancer Mother         ? in her 40's    Cancer Mother 49        lung cancer    Cancer Father         lung cancer    Heart Disease Father         hardening of arteries    Colon Cancer Sister         1 -dxed at age of 75    Cancer - colorectal Sister     Heart Defect Son 52     History   Drug Use No         Review of Systems    Review of Systems  CONSTITUTIONAL: NEGATIVE for fever, chills, change in weight  INTEGUMENTARY/SKIN: NEGATIVE for worrisome rashes, moles or lesions  EYES: NEGATIVE for vision changes or irritation  ENT/MOUTH: NEGATIVE for ear, mouth and throat problems  RESP: NEGATIVE for significant cough or SOB  BREAST: NEGATIVE for masses, tenderness or  discharge  CV: NEGATIVE for chest pain, palpitations or peripheral edema  GI: NEGATIVE for nausea, abdominal pain, heartburn, or change in bowel habits  : NEGATIVE for frequency, dysuria, or hematuria  MUSCULOSKELETAL: NEGATIVE for significant arthralgias or myalgia  NEURO: NEGATIVE for weakness, dizziness or paresthesias  ENDOCRINE: NEGATIVE for temperature intolerance, skin/hair changes  HEME: NEGATIVE for bleeding problems  PSYCHIATRIC: NEGATIVE for changes in mood or affect    Objective    BP (!) 151/74 (BP Location: Right arm, Patient Position: Sitting, Cuff Size: Adult Regular)   Pulse 74   Resp 14   Ht 1.524 m (5')   Wt 64.9 kg (143 lb)   LMP  (LMP Unknown)   SpO2 97%   Breastfeeding No   BMI 27.93 kg/m     Estimated body mass index is 27.93 kg/m  as calculated from the following:    Height as of this encounter: 1.524 m (5').    Weight as of this encounter: 64.9 kg (143 lb).  Physical Exam  GENERAL: alert and no distress  HENT: ear canals and TM's normal, nose and mouth without ulcers or lesions  NECK: no adenopathy, no asymmetry, masses, or scars  RESP: lungs clear to auscultation - no rales, rhonchi or wheezes  CV: regular rate and rhythm, normal S1 S2, no S3 or S4, no murmur, click or rub, no peripheral edema   ABDOMEN: soft, nontender, no hepatosplenomegaly, no masses and bowel sounds normal  MS: no gross musculoskeletal defects noted, no edema  PSYCH: mentation appears normal, affect normal/bright  LYMPH: no cervical, supraclavicular, axillary, or inguinal adenopathy    Recent Labs   Lab Test 09/05/23  1108 03/23/23  1419 08/25/22  1119   HGB  --  13.5 13.3   PLT  --  278 288    138 137   POTASSIUM 5.2 4.5 4.7   CR 0.66 0.59 0.58        Diagnostics  No labs were ordered during this visit.   No EKG required for low risk surgery (cataract, skin procedure, breast biopsy, etc).    Revised Cardiac Risk Index (RCRI)  The patient has the following serious cardiovascular risks for perioperative  complications:   - No serious cardiac risks = 0 points     RCRI Interpretation: 1 point: Class II (low risk - 0.9% complication rate)         Signed Electronically by: Ramona Ann Aaseby-Aguilera, PA-C  Copy of this evaluation report is provided to requesting physician.         Answers submitted by the patient for this visit:  Patient Health Questionnaire (Submitted on 2/29/2024)  If you checked off any problems, how difficult have these problems made it for you to do your work, take care of things at home, or get along with other people?: Not difficult at all  PHQ9 TOTAL SCORE: 2

## 2024-03-11 NOTE — TELEPHONE ENCOUNTER
PA Initiation    Medication: ALCLOMETASONE DIPROPIONATE 0.05 % EX OINT  Insurance Company: [employer] Review - Phone [phone] Fax [phone]  Pharmacy Filling the Rx: [id number] [address]  Filling Pharmacy Phone: [phone]  Filling Pharmacy Fax: [phone]  Start Date: 3/10/2024

## 2024-03-11 NOTE — TELEPHONE ENCOUNTER
PRIOR AUTHORIZATION DENIED    Medication: ALCLOMETASONE DIPROPIONATE 0.05 % EX OINT    Insurance Company: [employer] Review - Phone [phone] Fax [phone]    Denial Date: 3/11/2024    Denial Reason(s): Patient needs to try and fail 1 more preferred medication:       Appeal Information:

## 2024-03-20 NOTE — TELEPHONE ENCOUNTER
Call to pt, she states she has tried other creams/ointments in the past, however they have not worked or she was allergic. She does not recall the names.     The appeal should be started.    [place] [employer]

## 2024-03-20 NOTE — TELEPHONE ENCOUNTER
Please ask her if she has ever had another steroid cream or ointment, it is likely that Medicare changed this year what is covered.   If she hasn't tried another I will just pick something that is covered, if she has had issues, I will start the appeal.   Dr. Maida Corrales, DO    Obstetrics and Gynecology  [facility]

## 2024-03-27 NOTE — TELEPHONE ENCOUNTER
Do I just write a letter and then you guys send it? Or how does it work?   Dr. Maida Corrales, DO    Obstetrics and Gynecology  [facility] and [place]

## 2024-03-27 NOTE — TELEPHONE ENCOUNTER
Letter started.    Edit as needed, then we can print and fax to 1 [phone]      Lily COHN RN BSN

## 2024-03-28 NOTE — TELEPHONE ENCOUNTER
Edited and signed   Please apply to appeal  Dr. Maida Corrales, DO    Obstetrics and Gynecology  [facility] and [place]

## 2024-04-08 ENCOUNTER — OFFICE VISIT (OUTPATIENT)
Dept: OBGYN | Facility: CLINIC | Age: 84
End: 2024-04-08
Payer: MEDICARE

## 2024-04-08 VITALS
WEIGHT: 148.2 LBS | BODY MASS INDEX: 29.09 KG/M2 | SYSTOLIC BLOOD PRESSURE: 144 MMHG | DIASTOLIC BLOOD PRESSURE: 69 MMHG | HEIGHT: 60 IN

## 2024-04-08 DIAGNOSIS — L90.0 LICHEN SCLEROSUS: ICD-10-CM

## 2024-04-08 DIAGNOSIS — N90.4 LICHEN SCLEROSUS ET ATROPHICUS OF THE VULVA: Primary | ICD-10-CM

## 2024-04-08 PROCEDURE — 99213 OFFICE O/P EST LOW 20 MIN: CPT | Performed by: FAMILY MEDICINE

## 2024-04-08 RX ORDER — ALCLOMETASONE DIPROPIONATE 0.5 MG/G
OINTMENT TOPICAL DAILY
Qty: 120 G | Refills: 11 | Status: SHIPPED | OUTPATIENT
Start: 2024-04-08

## 2024-04-08 NOTE — PATIENT INSTRUCTIONS
Return yearly       Dr. Maida Corrales, DO    Obstetrics and Gynecology  [facility] and [place]

## 2024-04-08 NOTE — NURSING NOTE
Chief Complaint   Patient presents with    RECHECK     Follow up lichen sclerosis--uses cream once a day       Initial BP (!) 144/69   Ht 1.524 m (5')   Wt 67.2 kg (148 lb 3.2 oz)   LMP  (LMP Unknown)   BMI 28.94 kg/m   Estimated body mass index is 28.94 kg/m  as calculated from the following:    Height as of this encounter: 1.524 m (5').    Weight as of this encounter: 67.2 kg (148 lb 3.2 oz).  BP completed using cuff size: regular    Questioned patient about current smoking habits.  Pt. quit smoking some time ago.          The following HM Due: NONE

## 2024-04-08 NOTE — PROGRESS NOTES
SUBJECTIVE:  Natividad Mcginnis is an 83 year old   woman who presents for   Gyn follow-up exam. She was previously seen for Lichen sclerosis follow-up, using   Aclovate daily.    Past Medical History:   Diagnosis Date    Anxiety     Depressive disorder     Thyroid disease           Family History   Problem Relation Age of Onset    Breast Cancer Mother         ? in her 40's    Cancer Mother 49        lung cancer    Cancer Father         lung cancer    Heart Disease Father         hardening of arteries    Colon Cancer Sister         1 -dxed at age of 75    Cancer - colorectal Sister     Heart Defect Son 52       Past Surgical History:   Procedure Laterality Date    3       APPENDECTOMY      as a child     COLONOSCOPY      COLONOSCOPY N/A 10/6/2014    Procedure: COLONOSCOPY;  Surgeon: Billie Araiza MD;  Location: [facility]    TUBAL LIGATION      US BREAST BIOPSY VACUUM LEFT      normal, whlie ago       Current Outpatient Medications   Medication Sig Dispense Refill    alclomethasone (ACLOVATE) 0.05 % ointment Apply topically daily 120 g 0    amLODIPine (NORVASC) 10 MG tablet Take 1 tablet (10 mg) by mouth daily 90 tablet 1    aspirin (ASA) 81 MG EC tablet Take 1 tablet (81 mg) by mouth daily 90 tablet 3    atorvastatin (LIPITOR) 10 MG tablet TAKE one-HALF TABLET BY MOUTH DAILY 45 tablet 3    gabapentin (NEURONTIN) 300 MG capsule Take 1 capsule (300 mg) by mouth 3 times daily 270 capsule 3    levothyroxine (SYNTHROID/LEVOTHROID) 75 MCG tablet   3    lisinopril (ZESTRIL) 20 MG tablet Take 1 tablet (20 mg) by mouth daily 90 tablet 3    prednisoLONE acetate (PRED FORTE) 1 % ophthalmic suspension       risperiDONE (RISPERDAL) 0.5 MG tablet Take 1 tablet (0.5 mg) by mouth At Bedtime 90 tablet 3    sertraline (ZOLOFT) 100 MG tablet Take 1 tablet (100 mg) by mouth daily 90 tablet 3    valACYclovir (VALTREX) 1000 mg tablet        No current facility-administered medications for this visit.     Allergies   Allergen  Reactions    Hydrocortisone Valerate      reactioin    Latex      Bad reaction    Nickel Sulfate      reaction    No Clinical Screening - See Comments      Reaction to Jasamine absolute, Thiruam mix, Quaternium, gold, Amidoamine, Glutaraldehyde ----BAD REACTION    Shampoos [Dhs]      clairol shampoo    T-Butylhydroquinone      Bad reaction       Social History     Tobacco Use    Smoking status: Former     Types: Cigarettes    Smokeless tobacco: Never    Tobacco comments:     quit    Substance Use Topics    Alcohol use: Yes     Alcohol/week: 3.0 standard drinks of alcohol     Types: 3 Glasses of wine per week     Comment: a couple times a week       Review Of Systems  Ears/Nose/Throat: negative  Respiratory: No shortness of breath, dyspnea on exertion, cough, or hemoptysis  Cardiovascular: negative  Gastrointestinal: negative  Genitourinary: See HPI   Constitutional, HEENT, cardiovascular, pulmonary, GI, , musculoskeletal, neuro, skin, endocrine and psych systems are negative, except as otherwise noted.      OBJECTIVE:  BP (!) 144/69   Ht 1.524 m (5')   Wt 67.2 kg (148 lb 3.2 oz)   LMP  (LMP Unknown)   BMI 28.94 kg/m    General appearance: healthy, alert, and no distress  Abdomen: soft , non-tender, no masses   Pelvic: Pelvic examination with no pap/no  Gonorrhea and Chlamydia  External genitalia normal   and vagina normal rugatted not atrophic -  Examination of urethra showed normal no masses, tenderness, scarring  bladder, no masses or tenderness  Cervix no lesions or discharge  Bimanual exam with deferred 2/2 patient request      ASSESSMENT:  Natividad Mcginnis is an 83 year old   woman who presents for   Gyn follow-up exam. She was previously seen for Lichen sclerosis follow-up, using   Aclovate daily.    PLAN:  Dx: lichen sclerosis   1)  Lichen sclerosis : return yearly,           Dr. Maida Corrales, DO    OB/GYN   [facility]

## 2024-05-01 ENCOUNTER — OFFICE VISIT (OUTPATIENT)
Dept: FAMILY MEDICINE | Facility: CLINIC | Age: 84
End: 2024-05-01
Payer: MEDICARE

## 2024-05-01 VITALS
DIASTOLIC BLOOD PRESSURE: 74 MMHG | WEIGHT: 143 LBS | BODY MASS INDEX: 28.07 KG/M2 | OXYGEN SATURATION: 95 % | HEART RATE: 86 BPM | SYSTOLIC BLOOD PRESSURE: 159 MMHG | RESPIRATION RATE: 14 BRPM | HEIGHT: 60 IN | TEMPERATURE: 98.1 F

## 2024-05-01 DIAGNOSIS — F41.9 ANXIETY: ICD-10-CM

## 2024-05-01 DIAGNOSIS — I65.21 CAROTID ARTERY STENOSIS, ASYMPTOMATIC, RIGHT: ICD-10-CM

## 2024-05-01 DIAGNOSIS — F33.3 SEVERE RECURRENT MAJOR DEPRESSIVE DISORDER WITH PSYCHOTIC FEATURES (H): ICD-10-CM

## 2024-05-01 DIAGNOSIS — I10 BENIGN ESSENTIAL HYPERTENSION: Primary | ICD-10-CM

## 2024-05-01 PROCEDURE — 99214 OFFICE O/P EST MOD 30 MIN: CPT | Performed by: PHYSICIAN ASSISTANT

## 2024-05-01 RX ORDER — LEVOTHYROXINE SODIUM 75 UG/1
TABLET ORAL
Refills: 3 | Status: CANCELLED | OUTPATIENT
Start: 2024-05-01

## 2024-05-01 RX ORDER — LORAZEPAM 0.5 MG/1
0.5 TABLET ORAL EVERY 6 HOURS PRN
Qty: 20 TABLET | Refills: 0 | Status: SHIPPED | OUTPATIENT
Start: 2024-05-01

## 2024-05-01 RX ORDER — RISPERIDONE 0.5 MG/1
0.5 TABLET ORAL AT BEDTIME
Qty: 90 TABLET | Refills: 3 | Status: SHIPPED | OUTPATIENT
Start: 2024-05-01

## 2024-05-01 RX ORDER — ATORVASTATIN CALCIUM 10 MG/1
TABLET, FILM COATED ORAL
Qty: 45 TABLET | Refills: 3 | Status: SHIPPED | OUTPATIENT
Start: 2024-05-01

## 2024-05-01 RX ORDER — RESPIRATORY SYNCYTIAL VIRUS VACCINE 120MCG/0.5
0.5 KIT INTRAMUSCULAR ONCE
Qty: 1 EACH | Refills: 0 | Status: CANCELLED | OUTPATIENT
Start: 2024-05-01 | End: 2024-05-01

## 2024-05-01 RX ORDER — LISINOPRIL 30 MG/1
30 TABLET ORAL DAILY
Qty: 90 TABLET | Refills: 1 | Status: SHIPPED | OUTPATIENT
Start: 2024-05-01

## 2024-05-01 RX ORDER — AMLODIPINE BESYLATE 10 MG/1
10 TABLET ORAL DAILY
Qty: 90 TABLET | Refills: 1 | Status: SHIPPED | OUTPATIENT
Start: 2024-05-01

## 2024-05-01 RX ORDER — SERTRALINE HYDROCHLORIDE 100 MG/1
100 TABLET, FILM COATED ORAL DAILY
Qty: 90 TABLET | Refills: 3 | Status: SHIPPED | OUTPATIENT
Start: 2024-05-01

## 2024-05-01 RX ORDER — VALACYCLOVIR HYDROCHLORIDE 1 G/1
TABLET, FILM COATED ORAL
Status: CANCELLED | OUTPATIENT
Start: 2024-05-01

## 2024-05-01 RX ORDER — GABAPENTIN 300 MG/1
300 CAPSULE ORAL 3 TIMES DAILY
Qty: 270 CAPSULE | Refills: 3 | Status: SHIPPED | OUTPATIENT
Start: 2024-05-01

## 2024-05-01 NOTE — PROGRESS NOTES
Assessment & Plan     Benign essential hypertension  Increased lisinopril to 30 mg    Will have come in for nurse only BP check x 2 and then follow-up for office visit in 1 month with BP diary.      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    - sertraline (ZOLOFT) 100 MG tablet; Take 1 tablet (100 mg) by mouth daily  - LORazepam (ATIVAN) 0.5 MG tablet; Take 1 tablet (0.5 mg) by mouth every 6 hours as needed for anxiety      Patient Instructions   Anxiety: increase sertraline to 100 mg  ( has been taking 50 mg )     Take the lorazepam as needed when feeling very anxious.  This is a temporary prescription.      High blood pressure:  increased lisinopril to 30 mg.  Please follow-up for 1 nurse only visit and office visit in 6 weeks.           BMI  Estimated body mass index is 27.93 kg/m  as calculated from the following:    Height as of this encounter: 1.524 m (5').    Weight as of this encounter: 64.9 kg (143 lb).             Nasrin Henson is a 83 year old, presenting for the following health issues:  No chief complaint on file.        5/1/2024     3:05 PM   Additional Questions   Roomed by Ced Wood   Accompanied by self       Jo mental health has been stable for years until recently. After eye surgery she is struggling with vision and this is creating some increased anxiety for her.   She take zoloft 100 mg tablet but takes 1/2 tablets daily (insurance wont pay for 50 mg tablet. )              History of Present Illness       Reason for visit:  Follow up    She eats 0-1 servings of fruits and vegetables daily.She consumes 0 sweetened beverage(s) daily.She exercises with enough effort to increase her heart rate 9 or less minutes per day.  She exercises with enough effort to increase her heart rate 3 or less days per week.   She is taking medications regularly.           Review of Systems  CONSTITUTIONAL: NEGATIVE for fever, chills, change in weight  INTEGUMENTARY/SKIN: NEGATIVE for worrisome rashes, moles or lesions  EYES: NEGATIVE for vision changes or irritation  ENT/MOUTH: NEGATIVE for ear, mouth and throat problems  RESP: NEGATIVE for significant cough or SOB  BREAST: NEGATIVE for masses, tenderness or discharge  CV: NEGATIVE for chest pain, palpitations or peripheral edema  GI: NEGATIVE for nausea, abdominal pain, heartburn, or change in bowel habits  : NEGATIVE for frequency, dysuria, or hematuria  MUSCULOSKELETAL: NEGATIVE for significant arthralgias or myalgia  NEURO: NEGATIVE for weakness, dizziness or paresthesias  ENDOCRINE: NEGATIVE for temperature intolerance, skin/hair changes  HEME: NEGATIVE for bleeding problems  PSYCHIATRIC: anxiety      Objective    LMP  (LMP Unknown)   There is no height or weight on file to calculate BMI.  Physical Exam   GENERAL: alert and no distress  HENT: ear canals and TM's normal, nose and mouth without ulcers or lesions  NECK: no adenopathy, no asymmetry, masses, or scars  RESP: lungs clear to auscultation - no rales, rhonchi or wheezes  CV: regular rate and rhythm, normal S1 S2, no S3 or S4, no murmur, click or rub, no peripheral edema   MS: no gross musculoskeletal defects noted, no edema  PSYCH: mentation appears normal and anxious            Signed Electronically by: Ramona Ann Aaseby-Aguilera, PA-C

## 2024-05-15 ENCOUNTER — ALLIED HEALTH/NURSE VISIT (OUTPATIENT)
Dept: FAMILY MEDICINE | Facility: CLINIC | Age: 84
End: 2024-05-15
Payer: MEDICARE

## 2024-05-15 VITALS — SYSTOLIC BLOOD PRESSURE: 128 MMHG | DIASTOLIC BLOOD PRESSURE: 71 MMHG

## 2024-05-15 DIAGNOSIS — I10 BENIGN ESSENTIAL HYPERTENSION: Primary | ICD-10-CM

## 2024-05-15 PROCEDURE — 99207 PR NO CHARGE NURSE ONLY: CPT

## 2024-05-15 NOTE — PROGRESS NOTES
Natividad Mcginnis is a 83 year old patient who comes in today for a Blood Pressure check.  Initial BP:  /71 (BP Location: Left arm, Patient Position: Sitting, Cuff Size: Adult Regular)   LMP  (LMP Unknown)      Data Unavailable  Disposition: follow-up as previously indicated by provider    Lynette Stewart MA on 5/15/2024 at 2:49 PM

## 2024-05-29 ENCOUNTER — TRANSFERRED RECORDS (OUTPATIENT)
Dept: HEALTH INFORMATION MANAGEMENT | Facility: CLINIC | Age: 84
End: 2024-05-29
Payer: MEDICARE

## 2024-06-12 ENCOUNTER — OFFICE VISIT (OUTPATIENT)
Dept: FAMILY MEDICINE | Facility: CLINIC | Age: 84
End: 2024-06-12
Payer: MEDICARE

## 2024-06-12 VITALS
DIASTOLIC BLOOD PRESSURE: 68 MMHG | RESPIRATION RATE: 24 BRPM | BODY MASS INDEX: 26.82 KG/M2 | SYSTOLIC BLOOD PRESSURE: 138 MMHG | TEMPERATURE: 97.4 F | WEIGHT: 136.6 LBS | OXYGEN SATURATION: 96 % | HEART RATE: 71 BPM | HEIGHT: 60 IN

## 2024-06-12 DIAGNOSIS — I10 HYPERTENSION, UNSPECIFIED TYPE: Primary | ICD-10-CM

## 2024-06-12 DIAGNOSIS — F41.9 ANXIETY: ICD-10-CM

## 2024-06-12 PROCEDURE — G2211 COMPLEX E/M VISIT ADD ON: HCPCS | Performed by: PHYSICIAN ASSISTANT

## 2024-06-12 PROCEDURE — 99214 OFFICE O/P EST MOD 30 MIN: CPT | Performed by: PHYSICIAN ASSISTANT

## 2024-06-12 RX ORDER — RESPIRATORY SYNCYTIAL VIRUS VACCINE 120MCG/0.5
0.5 KIT INTRAMUSCULAR ONCE
Qty: 1 EACH | Refills: 0 | Status: CANCELLED | OUTPATIENT
Start: 2024-06-12 | End: 2024-06-12

## 2024-06-12 ASSESSMENT — PAIN SCALES - GENERAL: PAINLEVEL: NO PAIN (0)

## 2024-06-12 NOTE — PROGRESS NOTES
Assessment & Plan     Hypertension, unspecified type  Well controlled on amlodipine 10 mg and lisinopril 30 mg.  Follow-up in 6 months     Anxiety  Stable.  Fluoxetine was increased to 100 mg. States lorazepam working well. Advised to use sparingly and if anxiety not well controlled suggested to try buspar.  Well readdress at next visit.                  Subjective   Natividad is a 83 year old, presenting for the following health issues:  RECHECK (Follow up BP)        6/12/2024     3:13 PM   Additional Questions   Roomed by Juana   Accompanied by self       Htn: lisinopril was increased to 30 mg at last visit and is well controlled.    Anxiety: zoloft was increased to 100 mg and Natividad reports is working well.  Has been using lorazepam as needed and helps when she gets very anxious.   History of Present Illness       Hypertension: She presents for follow up of hypertension.  She does not check blood pressure  regularly outside of the clinic. Outside blood pressures have been over 140/90. She follows a low salt diet.     She eats 4 or more servings of fruits and vegetables daily.She consumes 0 sweetened beverage(s) daily.She exercises with enough effort to increase her heart rate 9 or less minutes per day.  She exercises with enough effort to increase her heart rate 3 or less days per week.   She is taking medications regularly.                 Review of Systems  CONSTITUTIONAL: NEGATIVE for fever, chills, change in weight  INTEGUMENTARY/SKIN: NEGATIVE for worrisome rashes, moles or lesions  EYES: NEGATIVE for vision changes or irritation  ENT/MOUTH: NEGATIVE for ear, mouth and throat problems  RESP: NEGATIVE for significant cough or SOB  BREAST: NEGATIVE for masses, tenderness or discharge  CV: NEGATIVE for chest pain, palpitations or peripheral edema  GI: NEGATIVE for nausea, abdominal pain, heartburn, or change in bowel habits  : NEGATIVE for frequency, dysuria, or hematuria  MUSCULOSKELETAL: NEGATIVE for  "significant arthralgias or myalgia  NEURO: NEGATIVE for weakness, dizziness or paresthesias  ENDOCRINE: NEGATIVE for temperature intolerance, skin/hair changes  HEME: NEGATIVE for bleeding problems  PSYCHIATRIC: NEGATIVE for changes in mood or affect      Objective    /68 (BP Location: Right arm, Patient Position: Sitting, Cuff Size: Adult Regular)   Pulse 71   Temp 97.4  F (36.3  C) (Oral)   Resp 24   Ht 1.511 m (4' 11.5\")   Wt 62 kg (136 lb 9.6 oz)   LMP  (LMP Unknown)   SpO2 96%   BMI 27.13 kg/m    Body mass index is 27.13 kg/m .  Physical Exam   GENERAL: alert and no distress  RESP: lungs clear to auscultation - no rales, rhonchi or wheezes  CV: regular rate and rhythm, normal S1 S2, no S3 or S4, no murmur, click or rub, no peripheral edema   PSYCH: mentation appears normal, affect normal/bright            Signed Electronically by: Ramona Ann Aaseby-Aguilera, PA-C    "

## 2024-08-12 ENCOUNTER — PATIENT OUTREACH (OUTPATIENT)
Dept: CARE COORDINATION | Facility: CLINIC | Age: 84
End: 2024-08-12
Payer: MEDICARE

## 2024-08-26 ENCOUNTER — PATIENT OUTREACH (OUTPATIENT)
Dept: CARE COORDINATION | Facility: CLINIC | Age: 84
End: 2024-08-26
Payer: MEDICARE

## 2024-10-24 DIAGNOSIS — I10 BENIGN ESSENTIAL HYPERTENSION: ICD-10-CM

## 2024-10-24 RX ORDER — LISINOPRIL 30 MG/1
30 TABLET ORAL DAILY
Qty: 90 TABLET | Refills: 0 | Status: SHIPPED | OUTPATIENT
Start: 2024-10-24

## 2024-10-24 NOTE — TELEPHONE ENCOUNTER
GFR Estimate   Date Value Ref Range Status   09/05/2023 88 >60 mL/min/1.73m2 Final   07/01/2021 83 >60 mL/min/[1.73_m2] Final     Comment:     Non  GFR Calc  Starting 12/18/2018, serum creatinine based estimated GFR (eGFR) will be   calculated using the Chronic Kidney Disease Epidemiology Collaboration   (CKD-EPI) equation.

## 2024-11-02 ENCOUNTER — HEALTH MAINTENANCE LETTER (OUTPATIENT)
Age: 84
End: 2024-11-02

## 2024-11-11 DIAGNOSIS — I10 BENIGN ESSENTIAL HYPERTENSION: ICD-10-CM

## 2024-11-11 RX ORDER — AMLODIPINE BESYLATE 10 MG/1
10 TABLET ORAL DAILY
Qty: 90 TABLET | Refills: 0 | Status: SHIPPED | OUTPATIENT
Start: 2024-11-11

## 2024-11-26 ENCOUNTER — OFFICE VISIT (OUTPATIENT)
Dept: INTERNAL MEDICINE | Facility: CLINIC | Age: 84
End: 2024-11-26
Payer: MEDICARE

## 2024-11-26 VITALS
OXYGEN SATURATION: 95 % | SYSTOLIC BLOOD PRESSURE: 152 MMHG | RESPIRATION RATE: 16 BRPM | BODY MASS INDEX: 26.9 KG/M2 | HEIGHT: 60 IN | TEMPERATURE: 98.1 F | WEIGHT: 137 LBS | HEART RATE: 88 BPM | DIASTOLIC BLOOD PRESSURE: 70 MMHG

## 2024-11-26 DIAGNOSIS — R59.0 SUBMANDIBULAR LYMPHADENOPATHY: Primary | ICD-10-CM

## 2024-11-26 LAB
BASOPHILS # BLD AUTO: 0.1 10E3/UL (ref 0–0.2)
BASOPHILS NFR BLD AUTO: 1 %
EOSINOPHIL # BLD AUTO: 0.2 10E3/UL (ref 0–0.7)
EOSINOPHIL NFR BLD AUTO: 2 %
ERYTHROCYTE [DISTWIDTH] IN BLOOD BY AUTOMATED COUNT: 13.8 % (ref 10–15)
HCT VFR BLD AUTO: 39.8 % (ref 35–47)
HGB BLD-MCNC: 13.6 G/DL (ref 11.7–15.7)
IMM GRANULOCYTES # BLD: 0 10E3/UL
IMM GRANULOCYTES NFR BLD: 0 %
LYMPHOCYTES # BLD AUTO: 2 10E3/UL (ref 0.8–5.3)
LYMPHOCYTES NFR BLD AUTO: 25 %
MCH RBC QN AUTO: 31.5 PG (ref 26.5–33)
MCHC RBC AUTO-ENTMCNC: 34.2 G/DL (ref 31.5–36.5)
MCV RBC AUTO: 92 FL (ref 78–100)
MONOCYTES # BLD AUTO: 0.6 10E3/UL (ref 0–1.3)
MONOCYTES NFR BLD AUTO: 8 %
NEUTROPHILS # BLD AUTO: 5.2 10E3/UL (ref 1.6–8.3)
NEUTROPHILS NFR BLD AUTO: 65 %
PLATELET # BLD AUTO: 250 10E3/UL (ref 150–450)
RBC # BLD AUTO: 4.32 10E6/UL (ref 3.8–5.2)
WBC # BLD AUTO: 8 10E3/UL (ref 4–11)

## 2024-11-26 PROCEDURE — 85025 COMPLETE CBC W/AUTO DIFF WBC: CPT

## 2024-11-26 PROCEDURE — 36415 COLL VENOUS BLD VENIPUNCTURE: CPT

## 2024-11-26 PROCEDURE — 99213 OFFICE O/P EST LOW 20 MIN: CPT

## 2024-11-26 ASSESSMENT — PATIENT HEALTH QUESTIONNAIRE - PHQ9
SUM OF ALL RESPONSES TO PHQ QUESTIONS 1-9: 3
10. IF YOU CHECKED OFF ANY PROBLEMS, HOW DIFFICULT HAVE THESE PROBLEMS MADE IT FOR YOU TO DO YOUR WORK, TAKE CARE OF THINGS AT HOME, OR GET ALONG WITH OTHER PEOPLE: NOT DIFFICULT AT ALL
SUM OF ALL RESPONSES TO PHQ QUESTIONS 1-9: 3

## 2024-11-26 NOTE — PROGRESS NOTES
"  Assessment & Plan     (R59.0) Submandibular lymphadenopathy  (primary encounter diagnosis)  Comment:    Pt presents with enlarged lymph node in the left submandibular region, which is not painful or tender. Upon exam, lymph node is not fixed or hard.   No tenderness to palpation of lymph node. She does not have any other enlarged lymph nodes present upon exam.   She denies any fevers, chills, night sweats, fatigue, body aches, cough or unintentional weight loss.  I will check CBC today and order Head/Neck US.   Plan: CBC with platelets and differential, US Head         Neck Soft Tissue               BMI  Estimated body mass index is 27.21 kg/m  as calculated from the following:    Height as of this encounter: 1.511 m (4' 11.5\").    Weight as of this encounter: 62.1 kg (137 lb).         Nasrin Henson is a 84 year old, presenting for the following health issues:  Mass      11/26/2024     2:03 PM   Additional Questions   Roomed by Madelaine     History of Present Illness       Reason for visit:  Lump in neck left side  Symptom onset:  1-2 weeks ago  Symptoms include:  Lump in neck does not hurt  Symptom intensity:  Mild  Symptom progression:  Staying the same  Had these symptoms before:  No  What makes it worse:  No  What makes it better:  No   She is taking medications regularly.             Objective    BP (!) 152/70   Pulse 88   Temp 98.1  F (36.7  C) (Tympanic)   Resp 16   Ht 1.511 m (4' 11.5\")   Wt 62.1 kg (137 lb)   LMP  (LMP Unknown)   SpO2 95%   BMI 27.21 kg/m    Body mass index is 27.21 kg/m .  Physical Exam  Constitutional:       General: She is not in acute distress.     Appearance: Normal appearance. She is not ill-appearing, toxic-appearing or diaphoretic.   HENT:      Head: Normocephalic and atraumatic.   Eyes:      Conjunctiva/sclera: Conjunctivae normal.   Pulmonary:      Effort: Pulmonary effort is normal.   Lymphadenopathy:      Cervical: Cervical adenopathy present.   Skin:     General: " Skin is warm and dry.   Neurological:      Mental Status: She is alert and oriented to person, place, and time.   Psychiatric:         Mood and Affect: Mood normal.         Behavior: Behavior normal.         Thought Content: Thought content normal.         Judgment: Judgment normal.                  Signed Electronically by: NOÉ Hernandez CNP

## 2024-12-04 ENCOUNTER — HOSPITAL ENCOUNTER (OUTPATIENT)
Dept: ULTRASOUND IMAGING | Facility: CLINIC | Age: 84
Discharge: HOME | End: 2024-12-04
Payer: MEDICARE

## 2024-12-04 DIAGNOSIS — R59.0 SUBMANDIBULAR LYMPHADENOPATHY: ICD-10-CM

## 2024-12-04 PROCEDURE — 76536 US EXAM OF HEAD AND NECK: CPT

## 2024-12-09 DIAGNOSIS — R59.0 SUBMANDIBULAR LYMPHADENOPATHY: Primary | ICD-10-CM

## 2025-01-22 DIAGNOSIS — I10 BENIGN ESSENTIAL HYPERTENSION: ICD-10-CM

## 2025-01-22 RX ORDER — LISINOPRIL 30 MG/1
30 TABLET ORAL DAILY
Qty: 90 TABLET | Refills: 0 | Status: SHIPPED | OUTPATIENT
Start: 2025-01-22

## 2025-03-04 ENCOUNTER — PATIENT OUTREACH (OUTPATIENT)
Dept: CARE COORDINATION | Facility: CLINIC | Age: 85
End: 2025-03-04
Payer: MEDICARE

## 2025-03-10 ENCOUNTER — HOSPITAL ENCOUNTER (OUTPATIENT)
Dept: ULTRASOUND IMAGING | Facility: CLINIC | Age: 85
Discharge: HOME | End: 2025-03-10
Payer: MEDICARE

## 2025-03-10 DIAGNOSIS — R59.0 SUBMANDIBULAR LYMPHADENOPATHY: ICD-10-CM

## 2025-03-10 PROCEDURE — 76536 US EXAM OF HEAD AND NECK: CPT

## 2025-05-08 DIAGNOSIS — L90.0 LICHEN SCLEROSUS: ICD-10-CM

## 2025-05-30 ENCOUNTER — TRANSFERRED RECORDS (OUTPATIENT)
Dept: HEALTH INFORMATION MANAGEMENT | Facility: CLINIC | Age: 85
End: 2025-05-30
Payer: MEDICARE

## 2025-05-30 LAB — TSH SERPL-ACNC: 0.41 UIU/ML (ref 0.47–4.68)

## 2025-05-31 ENCOUNTER — TRANSFERRED RECORDS (OUTPATIENT)
Dept: HEALTH INFORMATION MANAGEMENT | Facility: CLINIC | Age: 85
End: 2025-05-31
Payer: MEDICARE

## 2025-06-03 RX ORDER — ALCLOMETASONE DIPROPIONATE 0.5 MG/G
OINTMENT TOPICAL
Qty: 120 G | Refills: 0 | OUTPATIENT
Start: 2025-06-03

## 2025-06-08 SDOH — HEALTH STABILITY: PHYSICAL HEALTH: ON AVERAGE, HOW MANY DAYS PER WEEK DO YOU ENGAGE IN MODERATE TO STRENUOUS EXERCISE (LIKE A BRISK WALK)?: 0 DAYS

## 2025-06-08 SDOH — HEALTH STABILITY: PHYSICAL HEALTH: ON AVERAGE, HOW MANY MINUTES DO YOU ENGAGE IN EXERCISE AT THIS LEVEL?: 0 MIN

## 2025-06-08 ASSESSMENT — SOCIAL DETERMINANTS OF HEALTH (SDOH): HOW OFTEN DO YOU GET TOGETHER WITH FRIENDS OR RELATIVES?: ONCE A WEEK

## 2025-06-13 ENCOUNTER — OFFICE VISIT (OUTPATIENT)
Dept: FAMILY MEDICINE | Facility: CLINIC | Age: 85
End: 2025-06-13
Payer: MEDICARE

## 2025-06-13 VITALS
RESPIRATION RATE: 12 BRPM | WEIGHT: 132 LBS | DIASTOLIC BLOOD PRESSURE: 76 MMHG | OXYGEN SATURATION: 97 % | HEIGHT: 58 IN | TEMPERATURE: 97.9 F | HEART RATE: 79 BPM | SYSTOLIC BLOOD PRESSURE: 138 MMHG | BODY MASS INDEX: 27.71 KG/M2

## 2025-06-13 DIAGNOSIS — M54.50 ACUTE BILATERAL LOW BACK PAIN WITHOUT SCIATICA: ICD-10-CM

## 2025-06-13 DIAGNOSIS — F41.9 ANXIETY: ICD-10-CM

## 2025-06-13 DIAGNOSIS — I10 BENIGN ESSENTIAL HYPERTENSION: ICD-10-CM

## 2025-06-13 DIAGNOSIS — I10 HYPERTENSION, UNSPECIFIED TYPE: ICD-10-CM

## 2025-06-13 DIAGNOSIS — Z00.00 ENCOUNTER FOR MEDICARE ANNUAL WELLNESS EXAM: Primary | ICD-10-CM

## 2025-06-13 DIAGNOSIS — F33.3 SEVERE RECURRENT MAJOR DEPRESSIVE DISORDER WITH PSYCHOTIC FEATURES (H): ICD-10-CM

## 2025-06-13 DIAGNOSIS — Z23 NEED FOR VACCINATION: ICD-10-CM

## 2025-06-13 DIAGNOSIS — E78.5 HYPERLIPIDEMIA LDL GOAL <160: ICD-10-CM

## 2025-06-13 DIAGNOSIS — I65.21 CAROTID ARTERY STENOSIS, ASYMPTOMATIC, RIGHT: ICD-10-CM

## 2025-06-13 LAB
ANION GAP SERPL CALCULATED.3IONS-SCNC: 13 MMOL/L (ref 7–15)
BUN SERPL-MCNC: 15.5 MG/DL (ref 8–23)
CALCIUM SERPL-MCNC: 9.8 MG/DL (ref 8.8–10.4)
CHLORIDE SERPL-SCNC: 102 MMOL/L (ref 98–107)
CHOLEST SERPL-MCNC: 177 MG/DL
CREAT SERPL-MCNC: 0.77 MG/DL (ref 0.51–0.95)
EGFRCR SERPLBLD CKD-EPI 2021: 77 ML/MIN/1.73M2
FASTING STATUS PATIENT QL REPORTED: ABNORMAL
FASTING STATUS PATIENT QL REPORTED: ABNORMAL
GLUCOSE SERPL-MCNC: 86 MG/DL (ref 70–99)
HCO3 SERPL-SCNC: 21 MMOL/L (ref 22–29)
HDLC SERPL-MCNC: 74 MG/DL
LDLC SERPL CALC-MCNC: 70 MG/DL
NONHDLC SERPL-MCNC: 103 MG/DL
POTASSIUM SERPL-SCNC: 4.9 MMOL/L (ref 3.4–5.3)
SODIUM SERPL-SCNC: 136 MMOL/L (ref 135–145)
TRIGL SERPL-MCNC: 166 MG/DL

## 2025-06-13 PROCEDURE — 36415 COLL VENOUS BLD VENIPUNCTURE: CPT

## 2025-06-13 PROCEDURE — 80048 BASIC METABOLIC PNL TOTAL CA: CPT

## 2025-06-13 PROCEDURE — 99214 OFFICE O/P EST MOD 30 MIN: CPT | Mod: 25

## 2025-06-13 PROCEDURE — G0439 PPPS, SUBSEQ VISIT: HCPCS

## 2025-06-13 PROCEDURE — G2211 COMPLEX E/M VISIT ADD ON: HCPCS

## 2025-06-13 PROCEDURE — 1126F AMNT PAIN NOTED NONE PRSNT: CPT

## 2025-06-13 PROCEDURE — 3048F LDL-C <100 MG/DL: CPT

## 2025-06-13 PROCEDURE — 80061 LIPID PANEL: CPT

## 2025-06-13 PROCEDURE — 3078F DIAST BP <80 MM HG: CPT

## 2025-06-13 PROCEDURE — 3074F SYST BP LT 130 MM HG: CPT

## 2025-06-13 RX ORDER — ASPIRIN 81 MG/1
81 TABLET, COATED ORAL DAILY
Qty: 90 TABLET | Refills: 3 | Status: SHIPPED | OUTPATIENT
Start: 2025-06-13

## 2025-06-13 RX ORDER — SERTRALINE HYDROCHLORIDE 100 MG/1
100 TABLET, FILM COATED ORAL DAILY
Qty: 90 TABLET | Refills: 3 | Status: SHIPPED | OUTPATIENT
Start: 2025-06-13

## 2025-06-13 RX ORDER — ATORVASTATIN CALCIUM 10 MG/1
TABLET, FILM COATED ORAL
Qty: 45 TABLET | Refills: 3 | Status: SHIPPED | OUTPATIENT
Start: 2025-06-13

## 2025-06-13 RX ORDER — RISPERIDONE 0.5 MG/1
0.5 TABLET ORAL AT BEDTIME
Qty: 90 TABLET | Refills: 3 | Status: SHIPPED | OUTPATIENT
Start: 2025-06-13

## 2025-06-13 RX ORDER — GABAPENTIN 300 MG/1
300 CAPSULE ORAL 3 TIMES DAILY
Qty: 90 CAPSULE | Refills: 1 | Status: SHIPPED | OUTPATIENT
Start: 2025-06-13

## 2025-06-13 RX ORDER — AMLODIPINE BESYLATE 10 MG/1
10 TABLET ORAL
Qty: 30 TABLET | Refills: 1 | Status: SHIPPED | OUTPATIENT
Start: 2025-06-13

## 2025-06-13 ASSESSMENT — ANXIETY QUESTIONNAIRES
GAD7 TOTAL SCORE: 0
GAD7 TOTAL SCORE: 0
7. FEELING AFRAID AS IF SOMETHING AWFUL MIGHT HAPPEN: NOT AT ALL
4. TROUBLE RELAXING: NOT AT ALL
5. BEING SO RESTLESS THAT IT IS HARD TO SIT STILL: NOT AT ALL
8. IF YOU CHECKED OFF ANY PROBLEMS, HOW DIFFICULT HAVE THESE MADE IT FOR YOU TO DO YOUR WORK, TAKE CARE OF THINGS AT HOME, OR GET ALONG WITH OTHER PEOPLE?: NOT DIFFICULT AT ALL
6. BECOMING EASILY ANNOYED OR IRRITABLE: NOT AT ALL
3. WORRYING TOO MUCH ABOUT DIFFERENT THINGS: NOT AT ALL
7. FEELING AFRAID AS IF SOMETHING AWFUL MIGHT HAPPEN: NOT AT ALL
IF YOU CHECKED OFF ANY PROBLEMS ON THIS QUESTIONNAIRE, HOW DIFFICULT HAVE THESE PROBLEMS MADE IT FOR YOU TO DO YOUR WORK, TAKE CARE OF THINGS AT HOME, OR GET ALONG WITH OTHER PEOPLE: NOT DIFFICULT AT ALL
2. NOT BEING ABLE TO STOP OR CONTROL WORRYING: NOT AT ALL
GAD7 TOTAL SCORE: 0
1. FEELING NERVOUS, ANXIOUS, OR ON EDGE: NOT AT ALL

## 2025-06-13 ASSESSMENT — PAIN SCALES - GENERAL: PAINLEVEL_OUTOF10: NO PAIN (0)

## 2025-06-13 NOTE — PROGRESS NOTES
Preventive Care Visit  [facility]  NOÉ Brown CNP, Family Medicine  Jun 13, 2025      Assessment & Plan     Encounter for Medicare annual wellness exam  Reviewed age and gender appropriate screenings and lifestyle modifications with patient.     Need for vaccination  Reviewed, will present to pharmacy.     Hypertension, unspecified type  Benign essential hypertension  - amLODIPine (NORVASC) 10 MG tablet  Dispense: 30 tablet; Refill: 1  - Blood pressure readings varied during the visit; initial reading was low at 100/68, later readings were 138/76 and 142/78.  - Monitor blood pressure at home for a week and report results. Consider adjusting medication based on home readings.  - BASIC METABOLIC PANEL  - BASIC METABOLIC PANEL    Hyperlipidemia LDL goal <160  Carotid artery stenosis, asymptomatic, right  - atorvastatin (LIPITOR) 10 MG tablet  Dispense: 45 tablet; Refill: 3  - aspirin (ASA) 81 MG EC tablet  Dispense: 90 tablet; Refill: 3  Tolerating statin, Refills, check lipid panel today.   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  - Continue current medication regimen.  - sertraline (ZOLOFT) 100 MG tablet  Dispense: 90 tablet; Refill: 3  - risperiDONE (RISPERDAL) 0.5 MG tablet  Dispense: 90 tablet; Refill: 3  - gabapentin (NEURONTIN) 300 MG capsule  Dispense: 90 capsule; Refill: 1    Anxiety  - Mood appears stable with current medication regimen.  - Continue current medication regimen.  - sertraline (ZOLOFT) 100 MG tablet  Dispense: 90 tablet; Refill: 3    Acute bilateral low back pain without sciatica  - Pain in the lower back, possibly due to muscle tightness and disc degeneration.  - Use lidocaine patches and Voltaren gel for pain relief. Perform prescribed exercises at home. Avoid balance  "exercises due to unsteadiness.  - Risks and side effects: Avoidance of narcotic pain medications due to potential risks, including increased risk of falls and fractures.   - diclofenac (VOLTAREN) 1 % topical gel      The longitudinal plan of care for the diagnosis(es)/condition(s) as documented were addressed during this visit. Due to the added complexity in care, I will continue to support Natividad in the subsequent management and with ongoing continuity of care.            BMI  Estimated body mass index is 27.51 kg/m  as calculated from the following:    Height as of this encounter: 1.475 m (4' 10.08\").    Weight as of this encounter: 59.9 kg (132 lb).       Counseling  Appropriate preventive services were addressed with this patient via screening, questionnaire, or discussion as appropriate for fall prevention, nutrition, physical activity, Tobacco-use cessation, social engagement, weight loss and cognition.  Checklist reviewing preventive services available has been given to the patient.  Reviewed patient's diet, addressing concerns and/or questions.   Updated plan of care.  Patient reported difficulty with activities of daily living were addressed today.The patient was provided with written information regarding signs of hearing loss.           Subjective   Natividad is a 84 year old, presenting for the following:  Medicare Visit (Pt here for annual wellness. )        6/13/2025     2:48 PM   Additional Questions   Roomed by Awilda Hsu MA Learner   Accompanied by Self          HPI  Natividad Mcginnis, 84 years, female  - Right hip larger than the left, leaning toward the left more frequently  - Pain in lower back, started using a cane a couple of months ago  - Taking Tylenol for pain  - No radiation of pain down the legs  - Gabapentin taken for anxiety, possibly helping with back pain      Advance Care Planning    Patient states has Health Care Directive and will send to [employer].        6/8/2025 "   General Health   How would you rate your overall physical health? Good   Feel stress (tense, anxious, or unable to sleep) Not at all         6/8/2025   Nutrition   Diet: Regular (no restrictions)         6/8/2025   Exercise   Days per week of moderate/strenous exercise 0 days   Average minutes spent exercising at this level 0 min   (!) EXERCISE CONCERN      6/8/2025   Social Factors   Frequency of gathering with friends or relatives Once a week   Worry food won't last until get money to buy more No   Food not last or not have enough money for food? No   Do you have housing? (Housing is defined as stable permanent housing and does not include staying outside in a car, in a tent, in an abandoned building, in an overnight shelter, or couch-surfing.) Yes   Are you worried about losing your housing? No   Lack of transportation? No   Unable to get utilities (heat,electricity)? No         6/13/2025   Fall Risk   Reason Gait Speed Test Not Completed Unable to complete test, patient reported symptoms          6/8/2025   Activities of Daily Living- Home Safety   Needs help with the following daily activites Transportation    Shopping   Safety concerns in the home None of the above       Multiple values from one day are sorted in reverse-chronological order         6/8/2025   Dental   Dentist two times every year? Yes         6/8/2025   Hearing Screening   Hearing concerns? (!) I FEEL THAT PEOPLE ARE MUMBLING OR NOT SPEAKING CLEARLY.    (!) I NEED TO ASK PEOPLE TO SPEAK UP OR REPEAT THEMSELVES.    (!) IT'S HARDER TO UNDERSTAND WOMEN'S VOICES THAN MEN'S VOICES.    (!) IT'S HARD TO FOLLOW A CONVERSATION IN A NOISY RESTAURANT OR CROWDED ROOM.    (!) TROUBLE UNDESTANDING A SPEAKER IN A PUBLIC MEETING OR [employer] SERVICE.    (!) TROUBLE UNDERSTANDING SOFT OR WHISPERED SPEECH.       Multiple values from one day are sorted in reverse-chronological order         6/8/2025   Driving Risk Screening   Patient/family members have  concerns about driving No         2025   General Alertness/Fatigue Screening   Have you been more tired than usual lately? No         2025   Urinary Incontinence Screening   Bothered by leaking urine in past 6 months No       Today's PHQ-9 Score:       2025     6:16 PM   PHQ-9 SCORE   PHQ-9 Total Score MyChart 3 (Minimal depression)   PHQ-9 Total Score 3        Patient-reported         2025   Substance Use   Alcohol more than 3/day or more than 7/wk No   Do you have a current opioid prescription? No   How severe/bad is pain from 1 to 10? 3/10   Do you use any other substances recreationally? No     Social History     Tobacco Use    Smoking status: Former     Current packs/day: 0.00     Types: Cigarettes     Quit date: 1996     Years since quittin.4    Smokeless tobacco: Never    Tobacco comments:     quit    Vaping Use    Vaping status: Never Used   Substance Use Topics    Alcohol use: Yes     Alcohol/week: 3.0 standard drinks of alcohol     Types: 3 Glasses of wine per week     Comment: a couple times a week    Drug use: No           2022   LAST FHS-7 RESULTS   1st degree relative breast or ovarian cancer Yes   Any relative bilateral breast cancer No   Any male have breast cancer No   Any ONE woman have BOTH breast AND ovarian cancer Yes   Any woman with breast cancer before 50yrs Yes   2 or more relatives with breast AND/OR ovarian cancer No   2 or more relatives with breast AND/OR bowel cancer Yes        Mammogram Screening - After age 74- determine frequency with patient based on health status, life expectancy and patient goals              Reviewed and updated as needed this visit by Provider   Tobacco  Allergies  Meds  Problems  Med Hx  Surg Hx  Fam Hx              Current providers sharing in care for this patient include:  Patient Care Team:  Aaseby-Aguilera, Ramona Ann, PA-C as PCP - General (Physician Assistant)  Maida Corrales DO (OB/Gyn)  Aaseby-Aguilera,  "Staci Olvera PA-C as Assigned PCP  Maida Corraels DO as Assigned OBGYN Provider  Meli Newton as Personal Advocate & Liaison (PAL) (Family Medicine)    The following health maintenance items are reviewed in Epic and correct as of today:  Health Maintenance   Topic Date Due    ZOSTER VACCINE (1 of 2) Never done    RSV VACCINE (1 - 1-dose 75+ series) Never done    COVID-19 VACCINE (6 - 2024-25 season) 04/28/2025    PHQ-9  12/13/2025    MEDICARE ANNUAL WELLNESS VISIT  06/13/2026    BMP  06/13/2026    LIPID  06/13/2026    ANNUAL REVIEW OF HM ORDERS  06/13/2026    FALL RISK ASSESSMENT  06/13/2026    DTAP/TDAP/TD VACCINE (3 - Td or Tdap) 12/06/2027    ADVANCE CARE PLANNING  02/28/2029    DEPRESSION ACTION PLAN  Completed    INFLUENZA VACCINE  Completed    PNEUMOCOCCAL VACCINE 50+ YEARS  Completed    HPV VACCINE  Aged Out    MENINGITIS VACCINE  Aged Out    DEXA  Discontinued    TSH W/FREE T4 REFLEX  Discontinued    COLORECTAL CANCER SCREENING  Discontinued        ROS: 10 point ROS neg other than the symptoms noted above in the HPI.       Objective    Exam  /76   Pulse 79   Temp 97.9  F (36.6  C) (Oral)   Resp 12   Ht 1.475 m (4' 10.08\")   Wt 59.9 kg (132 lb)   LMP  (LMP Unknown)   SpO2 97%   BMI 27.51 kg/m     Estimated body mass index is 27.51 kg/m  as calculated from the following:    Height as of this encounter: 1.475 m (4' 10.08\").    Weight as of this encounter: 59.9 kg (132 lb).    Physical Exam  Vitals and nursing note reviewed.   Constitutional:       General: She is not in acute distress.     Appearance: Normal appearance. She is not ill-appearing, toxic-appearing or diaphoretic.   HENT:      Head: Normocephalic and atraumatic.      Right Ear: Tympanic membrane, ear canal and external ear normal.      Left Ear: Tympanic membrane, ear canal and external ear normal.      Nose: Nose normal.      Mouth/Throat:      Mouth: Mucous membranes are moist.      Pharynx: No oropharyngeal exudate or " posterior oropharyngeal erythema.   Eyes:      General: Lids are normal.      Extraocular Movements: Extraocular movements intact.      Conjunctiva/sclera: Conjunctivae normal.      Pupils: Pupils are equal, round, and reactive to light.   Neck:      Thyroid: No thyroid mass, thyromegaly or thyroid tenderness.   Cardiovascular:      Rate and Rhythm: Normal rate and regular rhythm.      Pulses: Normal pulses.      Heart sounds: Normal heart sounds.   Pulmonary:      Effort: Pulmonary effort is normal.      Breath sounds: Normal breath sounds.   Abdominal:      General: Abdomen is flat. Bowel sounds are normal.      Palpations: Abdomen is soft.      Tenderness: There is no abdominal tenderness.      Hernia: No hernia is present.   Musculoskeletal:         General: Normal range of motion.      Cervical back: Normal range of motion and neck supple. No rigidity or tenderness.   Lymphadenopathy:      Cervical: No cervical adenopathy.   Skin:     General: Skin is warm and dry.      Capillary Refill: Capillary refill takes less than 2 seconds.   Neurological:      General: No focal deficit present.      Mental Status: She is alert.      Deep Tendon Reflexes: Reflexes are normal and symmetric.   Psychiatric:         Mood and Affect: Mood normal.         Speech: Speech normal.         Behavior: Behavior normal. Behavior is cooperative.         Thought Content: Thought content normal.         Judgment: Judgment normal.         Gait and balance assessed per Gait Speed Test.  Result as above.        6/13/2025   Mini Cog   Clock Draw Score 2 Normal   3 Item Recall 3 objects recalled   Mini Cog Total Score 5              Signed Electronically by: NOÉ Brown CNP    Answers submitted by the patient for this visit:  Patient Health Questionnaire (Submitted on 6/12/2025)  If you checked off any problems, how difficult have these problems made it for you to do your work, take care of things at home, or get along with other  people?: Not difficult at all  PHQ9 TOTAL SCORE: 3  Patient Health Questionnaire (G7) (Submitted on 6/13/2025)  MADISON 7 TOTAL SCORE: 0

## 2025-06-16 ENCOUNTER — RESULTS FOLLOW-UP (OUTPATIENT)
Dept: FAMILY MEDICINE | Facility: CLINIC | Age: 85
End: 2025-06-16

## 2025-07-07 ENCOUNTER — TELEPHONE (OUTPATIENT)
Dept: OBGYN | Facility: CLINIC | Age: 85
End: 2025-07-07
Payer: MEDICARE

## 2025-07-07 DIAGNOSIS — L90.0 LICHEN SCLEROSUS: ICD-10-CM

## 2025-07-07 RX ORDER — ALCLOMETASONE DIPROPIONATE 0.5 MG/G
OINTMENT TOPICAL DAILY
Qty: 120 G | Refills: 2 | Status: SHIPPED | OUTPATIENT
Start: 2025-07-07

## 2025-07-07 NOTE — TELEPHONE ENCOUNTER
[facility] Call Center    Phone Message    May a detailed message be left on voicemail: yes     Reason for Call: Medication Refill Request    Has the patient contacted the pharmacy for the refill? Yes   Name of medication being requested: alclomethasone (ACLOVATE) 0.05 % ointment   Provider who prescribed the medication: Dr. Corrales  Pharmacy: [id number] [id number] [facility]   Date medication is needed: ASAP, she is almost out. Pt's son didn't see MyChart about appt needed and is requesting alexa refill until Sept appt      Action Taken: Message routed to:  Other: [facility]    Travel Screening: Not Applicable     Date of Service:

## 2025-07-07 NOTE — TELEPHONE ENCOUNTER
3 month alexa refill sent.     Pt has appt with Dr Corrales scheduled for 9/4/25    Call to number on file, VM for her son.   Left message asking for Mulu to call back. No consent to communicate on file.     Need to let them know that Rx refill was sent.     Gertrude HERRERA RN  OB/GYN [place]

## 2025-07-09 DIAGNOSIS — L90.0 LICHEN SCLEROSUS: ICD-10-CM

## 2025-07-09 RX ORDER — ALCLOMETASONE DIPROPIONATE 0.5 MG/G
OINTMENT TOPICAL DAILY
Qty: 120 G | Refills: 2 | Status: CANCELLED | OUTPATIENT
Start: 2025-07-09

## 2025-07-09 NOTE — TELEPHONE ENCOUNTER
Aclovate     Last Written Prescription Date:  7/7/2025  Last Fill Quantity: 7/7/25,   # refills: 0  Last Office Visit: needs a visit it is scheduled  Future Office visit:  9/4/25   Ointment not available, Cub is asking for possible cream or something else  Dayana Dallas, CMA

## 2025-07-09 NOTE — TELEPHONE ENCOUNTER
Aclovate ointment is not available.    Pharmacy is requesting a different rx or an rx for the cream.    Natali GÓMEZ RN  [facility]

## 2025-07-09 NOTE — TELEPHONE ENCOUNTER
Can you check with patient if another cortisone cream works for her?  I think she needed the aclovate   Dr. Maida Corrales,     Obstetrics and Gynecology  [facility] and [place]

## 2025-07-10 NOTE — TELEPHONE ENCOUNTER
Pt states Aclovate is the only thing that has worked well due to allergies.    Pharmacy states they have the Aclovate cream available    Would this work for her in place of the ointment, if so can you send in please?    Routing to on-call as Dr Corrales is out of office.     Gertrude HERRERA RN  OB/GYN [place]

## 2025-07-17 ENCOUNTER — TELEPHONE (OUTPATIENT)
Dept: OBGYN | Facility: CLINIC | Age: 85
End: 2025-07-17
Payer: MEDICARE

## 2025-07-22 NOTE — TELEPHONE ENCOUNTER
Retail Pharmacy Prior Authorization Team   Phone: [phone]    PA Initiation    Medication: CLOBETASOL PROPIONATE 0.05 % EX OINT  Insurance Company: [employer]  Pharmacy Filling the Rx: [id number] - [place], [place] - [address]  Filling Pharmacy Phone: [phone]  Filling Pharmacy Fax:    Start Date: 7/22/2025

## 2025-07-24 NOTE — TELEPHONE ENCOUNTER
Retail Pharmacy Prior Authorization Team   Phone: [phone]    Prior Authorization Approval    Medication: CLOBETASOL PROPIONATE 0.05 % EX OINT  Authorization Effective Date: 4/23/2025  Authorization Expiration Date: 7/22/2026  Insurance Company: [employer] - Phone [phone] Fax [phone]  Which Pharmacy is filling the prescription: [id number] - [place], [place] - [address] [facility]  Pharmacy Notified: YES  Patient Notified: **Instructed pharmacy to notify patient when script is ready to /ship.**

## 2025-09-04 ENCOUNTER — OFFICE VISIT (OUTPATIENT)
Dept: OBGYN | Facility: CLINIC | Age: 85
End: 2025-09-04
Payer: MEDICARE

## 2025-09-04 VITALS — BODY MASS INDEX: 27.2 KG/M2 | DIASTOLIC BLOOD PRESSURE: 70 MMHG | SYSTOLIC BLOOD PRESSURE: 118 MMHG | WEIGHT: 130.5 LBS

## 2025-09-04 DIAGNOSIS — L90.0 LICHEN SCLEROSUS: ICD-10-CM

## 2025-09-04 DIAGNOSIS — Z23 NEED FOR PROPHYLACTIC VACCINATION AND INOCULATION AGAINST INFLUENZA: Primary | ICD-10-CM

## 2025-09-04 RX ORDER — CLOBETASOL PROPIONATE 0.5 MG/G
OINTMENT TOPICAL DAILY
Qty: 70 G | Refills: 11 | Status: SHIPPED | OUTPATIENT
Start: 2025-09-04